# Patient Record
Sex: FEMALE | Race: WHITE | Employment: UNEMPLOYED | URBAN - METROPOLITAN AREA
[De-identification: names, ages, dates, MRNs, and addresses within clinical notes are randomized per-mention and may not be internally consistent; named-entity substitution may affect disease eponyms.]

---

## 2017-05-31 PROBLEM — J30.1 SEASONAL ALLERGIC RHINITIS DUE TO POLLEN: Status: ACTIVE | Noted: 2017-05-31

## 2017-05-31 PROBLEM — Z34.80 OTHER NORMAL PREGNANCY, NOT FIRST: Status: ACTIVE | Noted: 2017-05-31

## 2017-10-17 PROBLEM — Z34.83 MULTIGRAVIDA IN THIRD TRIMESTER: Status: ACTIVE | Noted: 2017-10-17

## 2017-11-10 NOTE — PROGRESS NOTES
Patient ID verified. Allergies, medical history, prenatal record and prior to admission medications verified. Pt instructed to be NPO after midnight. Pt instructed to arrive at hospital @1000,come to entrance C and sign in at the registration desk on the 4th floor. Patient instructed to come to hospital sooner if SROM, labor, or concerning symptoms. Patient verbalized understanding. Questions encouraged and answered. Patient's prenatal record and scheduled delivery form have been scanned into [employer]. Results console and orders have been placed in [facility].

## 2017-11-13 ENCOUNTER — ANESTHESIA (OUTPATIENT)
Dept: LABOR AND DELIVERY | Age: 32
End: 2017-11-13
Payer: COMMERCIAL

## 2017-11-13 ENCOUNTER — ANESTHESIA EVENT (OUTPATIENT)
Dept: LABOR AND DELIVERY | Age: 32
End: 2017-11-13
Payer: COMMERCIAL

## 2017-11-13 ENCOUNTER — HOSPITAL ENCOUNTER (INPATIENT)
Age: 32
LOS: 1 days | Discharge: HOME | End: 2017-11-14
Attending: OBSTETRICS & GYNECOLOGY | Admitting: OBSTETRICS & GYNECOLOGY
Payer: COMMERCIAL

## 2017-11-13 PROBLEM — O48.0 POST-TERM PREGNANCY, 40-42 WEEKS OF GESTATION: Status: ACTIVE | Noted: 2017-11-13

## 2017-11-13 PROBLEM — Z3A.41 41 WEEKS GESTATION OF PREGNANCY: Status: ACTIVE | Noted: 2017-11-13

## 2017-11-13 PROBLEM — O48.0 41 WEEKS GESTATION OF PREGNANCY: Status: RESOLVED | Noted: 2017-11-13 | Resolved: 2017-11-13

## 2017-11-13 PROBLEM — O48.0 POST-TERM PREGNANCY, 40-42 WEEKS OF GESTATION: Status: RESOLVED | Noted: 2017-11-13 | Resolved: 2017-11-13

## 2017-11-13 PROBLEM — O48.0 41 WEEKS GESTATION OF PREGNANCY: Status: ACTIVE | Noted: 2017-11-13

## 2017-11-13 PROBLEM — Z37.9 NORMAL LABOR: Status: ACTIVE | Noted: 2017-11-13

## 2017-11-13 PROBLEM — Z3A.41 41 WEEKS GESTATION OF PREGNANCY: Status: RESOLVED | Noted: 2017-11-13 | Resolved: 2017-11-13

## 2017-11-13 LAB
ABO + RH BLD: NORMAL
BASE DEFICIT BLDCOA-SCNC: 2.4 MMOL/L (ref 0–2)
BASE DEFICIT BLDCOV-SCNC: 2.6 MMOL/L (ref 1.9–7.7)
BDY SITE: ABNORMAL
BDY SITE: ABNORMAL
BLOOD GROUP ANTIBODIES SERPL: NORMAL
ERYTHROCYTE [DISTWIDTH] IN BLOOD BY AUTOMATED COUNT: 14 % (ref 11.9–14.6)
HCO3 BLDCOA-SCNC: 24 MMOL/L (ref 22–26)
HCO3 BLDV-SCNC: 21 MMOL/L
HCT VFR BLD AUTO: 34.8 % (ref 35.8–46.3)
HGB BLD-MCNC: 11.6 G/DL (ref 11.7–15.4)
MCH RBC QN AUTO: 30.1 PG (ref 26.1–32.9)
MCHC RBC AUTO-ENTMCNC: 33.3 G/DL (ref 31.4–35)
MCV RBC AUTO: 90.4 FL (ref 79.6–97.8)
PCO2 BLDCOA: 49 MMHG (ref 33–49)
PCO2 BLDCOV: 35 MMHG (ref 14.1–43.3)
PH BLDCOA: 7.31 [PH] (ref 7.21–7.31)
PH BLDCOV: 7.4 [PH] (ref 7.2–7.44)
PLATELET # BLD AUTO: 249 K/UL (ref 150–450)
PMV BLD AUTO: 11.2 FL (ref 10.8–14.1)
PO2 BLDCOA: 16 MMHG (ref 9–19)
PO2 BLDV: 30 MMHG (ref 30.4–57.2)
RBC # BLD AUTO: 3.85 M/UL (ref 4.05–5.25)
SERVICE CMNT-IMP: ABNORMAL
SERVICE CMNT-IMP: ABNORMAL
SPECIMEN EXP DATE BLD: NORMAL
WBC # BLD AUTO: 9.9 K/UL (ref 4.3–11.1)

## 2017-11-13 PROCEDURE — 77030014125 HC TY EPDRL BBMI -B: Performed by: ANESTHESIOLOGY

## 2017-11-13 PROCEDURE — 0HQ9XZZ REPAIR PERINEUM SKIN, EXTERNAL APPROACH: ICD-10-PCS | Performed by: OBSTETRICS & GYNECOLOGY

## 2017-11-13 PROCEDURE — 77030011943

## 2017-11-13 PROCEDURE — 77030018846 HC SOL IRR STRL H20 ICUM -A

## 2017-11-13 PROCEDURE — 10907ZC DRAINAGE OF AMNIOTIC FLUID, THERAPEUTIC FROM PRODUCTS OF CONCEPTION, VIA NATURAL OR ARTIFICIAL OPENING: ICD-10-PCS | Performed by: OBSTETRICS & GYNECOLOGY

## 2017-11-13 PROCEDURE — 86900 BLOOD TYPING SEROLOGIC ABO: CPT | Performed by: OBSTETRICS & GYNECOLOGY

## 2017-11-13 PROCEDURE — 74011000250 HC RX REV CODE- 250

## 2017-11-13 PROCEDURE — 74011250637 HC RX REV CODE- 250/637: Performed by: OBSTETRICS & GYNECOLOGY

## 2017-11-13 PROCEDURE — 75410000003 HC RECOV DEL/VAG/CSECN EA 0.5 HR

## 2017-11-13 PROCEDURE — 82803 BLOOD GASES ANY COMBINATION: CPT

## 2017-11-13 PROCEDURE — 85027 COMPLETE CBC AUTOMATED: CPT | Performed by: OBSTETRICS & GYNECOLOGY

## 2017-11-13 PROCEDURE — 74011258636 HC RX REV CODE- 258/636: Performed by: OBSTETRICS & GYNECOLOGY

## 2017-11-13 PROCEDURE — 77030011945 HC CATH URIN INT ST MENT -A

## 2017-11-13 PROCEDURE — 74011250636 HC RX REV CODE- 250/636: Performed by: OBSTETRICS & GYNECOLOGY

## 2017-11-13 PROCEDURE — 74011250636 HC RX REV CODE- 250/636

## 2017-11-13 PROCEDURE — 77030002888 HC SUT CHRMC J&J -A

## 2017-11-13 PROCEDURE — 75410000000 HC DELIVERY VAGINAL/SINGLE

## 2017-11-13 PROCEDURE — 65270000029 HC RM PRIVATE

## 2017-11-13 PROCEDURE — 76060000078 HC EPIDURAL ANESTHESIA

## 2017-11-13 PROCEDURE — 74011250636 HC RX REV CODE- 250/636: Performed by: ANESTHESIOLOGY

## 2017-11-13 PROCEDURE — 75410000002 HC LABOR FEE PER 1 HR

## 2017-11-13 PROCEDURE — 36415 COLL VENOUS BLD VENIPUNCTURE: CPT | Performed by: OBSTETRICS & GYNECOLOGY

## 2017-11-13 PROCEDURE — 4A1HXCZ MONITORING OF PRODUCTS OF CONCEPTION, CARDIAC RATE, EXTERNAL APPROACH: ICD-10-PCS | Performed by: OBSTETRICS & GYNECOLOGY

## 2017-11-13 RX ORDER — SODIUM CHLORIDE 0.9 % (FLUSH) 0.9 %
5-10 SYRINGE (ML) INJECTION EVERY 8 HOURS
Status: DISCONTINUED | OUTPATIENT
Start: 2017-11-13 | End: 2017-11-13 | Stop reason: HOSPADM

## 2017-11-13 RX ORDER — LIDOCAINE HYDROCHLORIDE 20 MG/ML
INJECTION, SOLUTION EPIDURAL; INFILTRATION; INTRACAUDAL; PERINEURAL AS NEEDED
Status: DISCONTINUED | OUTPATIENT
Start: 2017-11-13 | End: 2017-11-13 | Stop reason: HOSPADM

## 2017-11-13 RX ORDER — ROPIVACAINE HYDROCHLORIDE 5 MG/ML
INJECTION, SOLUTION EPIDURAL; INFILTRATION; PERINEURAL AS NEEDED
Status: DISCONTINUED | OUTPATIENT
Start: 2017-11-13 | End: 2017-11-13 | Stop reason: HOSPADM

## 2017-11-13 RX ORDER — PRENATAL VIT 96/IRON FUM/FOLIC 27MG-0.8MG
1 TABLET ORAL DAILY
Status: DISCONTINUED | OUTPATIENT
Start: 2017-11-14 | End: 2017-11-14 | Stop reason: HOSPADM

## 2017-11-13 RX ORDER — ROPIVACAINE HYDROCHLORIDE 2 MG/ML
INJECTION, SOLUTION EPIDURAL; INFILTRATION; PERINEURAL
Status: DISCONTINUED | OUTPATIENT
Start: 2017-11-13 | End: 2017-11-13 | Stop reason: HOSPADM

## 2017-11-13 RX ORDER — OXYTOCIN/0.9 % SODIUM CHLORIDE 15/250 ML
250 PLASTIC BAG, INJECTION (ML) INTRAVENOUS ONCE
Status: ACTIVE | OUTPATIENT
Start: 2017-11-13 | End: 2017-11-14

## 2017-11-13 RX ORDER — LORATADINE 10 MG/1
10 TABLET ORAL DAILY
Status: DISCONTINUED | OUTPATIENT
Start: 2017-11-14 | End: 2017-11-14 | Stop reason: HOSPADM

## 2017-11-13 RX ORDER — OXYTOCIN/RINGER'S LACTATE 15/250 ML
250 PLASTIC BAG, INJECTION (ML) INTRAVENOUS ONCE
Status: DISCONTINUED | OUTPATIENT
Start: 2017-11-13 | End: 2017-11-13 | Stop reason: SDUPTHER

## 2017-11-13 RX ORDER — LIDOCAINE HYDROCHLORIDE 20 MG/ML
JELLY TOPICAL
Status: DISCONTINUED | OUTPATIENT
Start: 2017-11-13 | End: 2017-11-13 | Stop reason: HOSPADM

## 2017-11-13 RX ORDER — OXYTOCIN/RINGER'S LACTATE 30/500 ML
.5-2 PLASTIC BAG, INJECTION (ML) INTRAVENOUS
Status: DISCONTINUED | OUTPATIENT
Start: 2017-11-13 | End: 2017-11-13

## 2017-11-13 RX ORDER — SODIUM CHLORIDE 0.9 % (FLUSH) 0.9 %
5-10 SYRINGE (ML) INJECTION AS NEEDED
Status: DISCONTINUED | OUTPATIENT
Start: 2017-11-13 | End: 2017-11-13 | Stop reason: HOSPADM

## 2017-11-13 RX ORDER — ROPIVACAINE HYDROCHLORIDE 2 MG/ML
INJECTION, SOLUTION EPIDURAL; INFILTRATION; PERINEURAL AS NEEDED
Status: DISCONTINUED | OUTPATIENT
Start: 2017-11-13 | End: 2017-11-13 | Stop reason: HOSPADM

## 2017-11-13 RX ORDER — SIMETHICONE 80 MG
80 TABLET,CHEWABLE ORAL
Status: DISCONTINUED | OUTPATIENT
Start: 2017-11-13 | End: 2017-11-14 | Stop reason: HOSPADM

## 2017-11-13 RX ORDER — FENTANYL CITRATE 50 UG/ML
INJECTION, SOLUTION INTRAMUSCULAR; INTRAVENOUS AS NEEDED
Status: DISCONTINUED | OUTPATIENT
Start: 2017-11-13 | End: 2017-11-13 | Stop reason: HOSPADM

## 2017-11-13 RX ORDER — DEXTROSE, SODIUM CHLORIDE, SODIUM LACTATE, POTASSIUM CHLORIDE, AND CALCIUM CHLORIDE 5; .6; .31; .03; .02 G/100ML; G/100ML; G/100ML; G/100ML; G/100ML
125 INJECTION, SOLUTION INTRAVENOUS CONTINUOUS
Status: DISCONTINUED | OUTPATIENT
Start: 2017-11-13 | End: 2017-11-13

## 2017-11-13 RX ORDER — BUTORPHANOL TARTRATE 1 MG/ML
1 INJECTION INTRAMUSCULAR; INTRAVENOUS
Status: DISCONTINUED | OUTPATIENT
Start: 2017-11-13 | End: 2017-11-13

## 2017-11-13 RX ORDER — IBUPROFEN 800 MG/1
800 TABLET ORAL
Status: DISCONTINUED | OUTPATIENT
Start: 2017-11-13 | End: 2017-11-14 | Stop reason: HOSPADM

## 2017-11-13 RX ORDER — MINERAL OIL
120 OIL (ML) ORAL
Status: COMPLETED | OUTPATIENT
Start: 2017-11-13 | End: 2017-11-13

## 2017-11-13 RX ORDER — SODIUM CHLORIDE 0.9 % (FLUSH) 0.9 %
5-10 SYRINGE (ML) INJECTION EVERY 8 HOURS
Status: DISCONTINUED | OUTPATIENT
Start: 2017-11-13 | End: 2017-11-13

## 2017-11-13 RX ORDER — LIDOCAINE HYDROCHLORIDE 10 MG/ML
1 INJECTION INFILTRATION; PERINEURAL
Status: DISCONTINUED | OUTPATIENT
Start: 2017-11-13 | End: 2017-11-13 | Stop reason: HOSPADM

## 2017-11-13 RX ORDER — OXYTOCIN/0.9 % SODIUM CHLORIDE 15/250 ML
250 PLASTIC BAG, INJECTION (ML) INTRAVENOUS ONCE
Status: DISCONTINUED | OUTPATIENT
Start: 2017-11-13 | End: 2017-11-13

## 2017-11-13 RX ORDER — HYDROCODONE BITARTRATE AND ACETAMINOPHEN 5; 325 MG/1; MG/1
1 TABLET ORAL
Status: DISCONTINUED | OUTPATIENT
Start: 2017-11-13 | End: 2017-11-14 | Stop reason: HOSPADM

## 2017-11-13 RX ORDER — SODIUM CHLORIDE 0.9 % (FLUSH) 0.9 %
5-10 SYRINGE (ML) INJECTION AS NEEDED
Status: DISCONTINUED | OUTPATIENT
Start: 2017-11-13 | End: 2017-11-13

## 2017-11-13 RX ADMIN — OXYTOCIN 2 MILLI-UNITS/MIN: 10 INJECTION, SOLUTION INTRAMUSCULAR; INTRAVENOUS at 14:09

## 2017-11-13 RX ADMIN — FENTANYL CITRATE 100 MCG: 50 INJECTION, SOLUTION INTRAMUSCULAR; INTRAVENOUS at 14:55

## 2017-11-13 RX ADMIN — LIDOCAINE HYDROCHLORIDE 6 ML: 20 INJECTION, SOLUTION EPIDURAL; INFILTRATION; INTRACAUDAL; PERINEURAL at 13:30

## 2017-11-13 RX ADMIN — SODIUM CHLORIDE, SODIUM LACTATE, POTASSIUM CHLORIDE, AND CALCIUM CHLORIDE 1000 ML: 600; 310; 30; 20 INJECTION, SOLUTION INTRAVENOUS at 10:45

## 2017-11-13 RX ADMIN — ROPIVACAINE HYDROCHLORIDE 5 ML: 5 INJECTION, SOLUTION EPIDURAL; INFILTRATION; PERINEURAL at 15:56

## 2017-11-13 RX ADMIN — MINERAL OIL 120 ML: 471.95 OIL ORAL at 16:19

## 2017-11-13 RX ADMIN — SODIUM CHLORIDE, SODIUM LACTATE, POTASSIUM CHLORIDE, CALCIUM CHLORIDE, AND DEXTROSE MONOHYDRATE 125 ML/HR: 600; 310; 30; 20; 5 INJECTION, SOLUTION INTRAVENOUS at 10:30

## 2017-11-13 RX ADMIN — ROPIVACAINE HYDROCHLORIDE 3 ML: 2 INJECTION, SOLUTION EPIDURAL; INFILTRATION; PERINEURAL at 11:19

## 2017-11-13 RX ADMIN — ROPIVACAINE HYDROCHLORIDE 10 ML/HR: 2 INJECTION, SOLUTION EPIDURAL; INFILTRATION; PERINEURAL at 11:19

## 2017-11-13 RX ADMIN — ROPIVACAINE HYDROCHLORIDE 4 ML: 2 INJECTION, SOLUTION EPIDURAL; INFILTRATION; PERINEURAL at 11:17

## 2017-11-13 NOTE — PROGRESS NOTES
Dr. Natalie Spaulding at bedside, strip reviewed by MD. AROM with light meconium per MD, SVE 6-7 per MD. See MD note

## 2017-11-13 NOTE — H&P
History & Physical    Name: Mackenzie Anderson MRN: [id number]  SSN: [id number]    YOB: 1985  Age: 34 y.o. Sex: female      Subjective:     Estimated Date of Delivery: 17  OB History    Para Term  AB Living   5 4 4   4   SAB TAB Ectopic Molar Multiple Live Births        4      # Outcome Date GA Lbr Dom/2nd Weight Sex Delivery Anes PTL Lv   5 Current            4 Term     F Vag-Spont   JOYA   3 Term     M Vag-Spont   JOYA   2 Term     F Vag-Spont   JOYA   1 Term     M Vag-Spont   JOYA          Ms. Avni Greenberg is admitted with pregnancy at 41w5d for induction of labor due to post dates. Prenatal course was normal.  Please see prenatal records for details. Past Medical History:   Diagnosis Date    Palpitations 11/10/2015     No past surgical history on file. Social History     Occupational History    Not on file. Social History Main Topics    Smoking status: Never Smoker    Smokeless tobacco: Never Used    Alcohol use No    Drug use: No    Sexual activity: Not on file     Family History   Problem Relation Age of Onset    No Known Problems Mother     No Known Problems Father     No Known Problems Sister     No Known Problems Brother     No Known Problems Maternal Aunt     No Known Problems Maternal Uncle     No Known Problems Paternal Aunt     No Known Problems Paternal Uncle     No Known Problems Maternal Grandmother     No Known Problems Maternal Grandfather     No Known Problems Paternal Grandmother     No Known Problems Paternal Grandfather        No Known Allergies  Prior to Admission medications    Medication Sig Start Date End Date Taking? Authorizing Provider   Prenatal 18-Iron-FA-DSS 90-1-50 mg tab Take  by mouth. Historical Provider   loratadine (CLARITIN) 10 mg tablet Take 1 Tab by mouth daily. 17   Yanni Bergeron MD        Review of Systems: Pertinent items are noted in the History of Present Illness.     Objective:         Physical Exam:  Patient without distress. Heart: Regular rate and rhythm  Lung: clear to auscultation throughout lung fields, no wheezes, no rales, no rhonchi and normal respiratory effort  Back: costovertebral angle tenderness absent  Abdomen: soft, nontender  Fundus: soft and non tender  Perineum: blood absent, amniotic fluid absent  Cervical Exam: 4-5 cm dilated    80% effaced    -2 station    Lower Extremities:  - Edema 1+   - No evidence of DVT seen on physical exam.  Membranes:  Intact      Prenatal Labs:   Lab Results   Component Value Date/Time    Rubella, External 11.90 04/17/2017    HBsAg, External Negative 04/17/2017    HIV, External N.R. 04/17/2017    RPR, External Non-Reactive 04/17/2017    ABO,Rh A+ Positive 04/17/2017    GrBStrep, External Negative 09/26/2017       Impression/Plan:     Principal Problem:    Post-term pregnancy, 40-42 weeks of gestation (11/13/2017)    Active Problems:    41 weeks gestation of pregnancy (11/13/2017)         Plan: Admit for induction of labor. Group B Strep negative.     Signed By:  Ted Padgett MD     November 13, 2017

## 2017-11-13 NOTE — PROGRESS NOTES
Admission assessment complete, see flowsheet for complete assessment. Abdomen palpated soft and non-tender. +FM. POC discussed with patient with patient and spouse, verbalize understanding and agreement. Deny any needs or concerns. Patient resting in bed with spouse at side. Call light within reach.

## 2017-11-13 NOTE — ANESTHESIA POSTPROCEDURE EVALUATION
Post-Anesthesia Evaluation and Assessment    Patient: Alex Quach MRN: [id number]  SSN: [id number]    YOB: 1985  Age: 34 y.o. Sex: female       Cardiovascular Function/Vital Signs  Visit Vitals    /61    Pulse 97    Temp 36.9 °C (98.4 °F)    Resp 18    Ht 5' 5\" (1.651 m)    Breastfeeding Yes       Patient is status post epidural anesthesia for * No procedures listed *. Nausea/Vomiting: None    Postoperative hydration reviewed and adequate. Pain:  Pain Scale 1: Numeric (0 - 10) (11/13/17 1630)  Pain Intensity 1: 0 (11/13/17 1630)   Managed    Neurological Status:   Neuro (WDL): Exceptions to WDL (11/13/17 1129)  Neuro  Neurologic State: Alert (11/13/17 1630)  Orientation Level: Oriented X4 (11/13/17 1630)  Cognition: Appropriate decision making (11/13/17 1630)  Speech: Clear (11/13/17 1630)  LUE Motor Response: Purposeful (11/13/17 1630)  LLE Motor Response: Numbness (post anesthesia) (11/13/17 1630)  RUE Motor Response: Purposeful (11/13/17 1630)  RLE Motor Response: Numbness (post anesthesia) (11/13/17 1630)   At baseline    Mental Status and Level of Consciousness: Arousable    Pulmonary Status:   O2 Device: Room air (11/13/17 1630)   Adequate oxygenation and airway patent    Complications related to anesthesia: None    Post-anesthesia assessment completed.  No concerns    Signed By: Nola Chiang MD     November 13, 2017

## 2017-11-13 NOTE — PROGRESS NOTES
1059 Patient sitting up on side of bed for an epidural placement. Dr. Jeanne Hickman, anesthesiologist and Milad Villegas CRNA at bedside.    1104 time out complete  1113 test dose per Dr. Jeanne Hickman, see anesthesia record for dosing, BPs per flowsheet  [phone] Patient back to bed with right hip tilt, toco and ultrasound adjusted frequently  1126 Patient to left side, toco and ultrasound adjusted frequently

## 2017-11-13 NOTE — IP AVS SNAPSHOT
[id number]  [address] 
[phone] Patient: Rita Amin MRN: [id number] QMZ: About your hospitalization You were admitted on:  2017 You last received care in the:  [address] [facility] You were discharged on:  2017 Why you were hospitalized Your primary diagnosis was:  Post-Term Pregnancy, 40-42 Weeks Of Gestation Your diagnoses also included:  41 Weeks Gestation Of Pregnancy,  (Spontaneous Vaginal Delivery) Things You Need To Do (next 8 weeks) Follow up with None Where:  None (395) Patient stated that they have no PCP Friday Dec 15, 2017 Global Post Op with Jayne Alvarado MD at 11:00 AM  
Where:  [facility]) Discharge Orders None A check crissy indicates which time of day the medication should be taken. My Medications TAKE these medications as instructed Instructions Each Dose to Equal  
 Morning Noon Evening Bedtime HYDROcodone-acetaminophen 5-325 mg per tablet Commonly known as:  Rocco Castellanos Your last dose was: Your next dose is: Take 1 Tab by mouth every four (4) hours as needed. Max Daily Amount: 6 Tabs. 1 Tab  
    
   
   
   
  
 ibuprofen 800 mg tablet Commonly known as:  MOTRIN Your last dose was: Your next dose is: Take 1 Tab by mouth every six (6) hours as needed. 800 mg  
    
   
   
   
  
 loratadine 10 mg tablet Commonly known as:  Drena Magic Your last dose was: Your next dose is: Take 1 Tab by mouth daily. 10 mg  
    
   
   
   
  
 Prenatal 18-Iron-FA-DSS 90-1-50 mg Tab Your last dose was: Your next dose is: Take  by mouth. Where to Get Your Medications These medications were sent to [facility], [address] [place] [address] Phone:  [phone]  
  ibuprofen 800 mg tablet Information on where to get these meds will be given to you by the nurse or doctor. ! Ask your nurse or doctor about these medications HYDROcodone-acetaminophen 5-325 mg per tablet Discharge Instructions DISCHARGE SUMMARY from Nurse PATIENT INSTRUCTIONS: 
 
What to do at Home: 
Recommended activity: Discharge instruction to follow: Activity: Pelvis rest for 6 weeks No heavy lifting over 15 lbs for 2 weeks No driving for 2 weeks No push/pull motion such as sweeping or vacuuming for 2 weeks No tub baths for 6 weeks If using jammie-bottle continue to use until comfortable stopping. Change sanitary pad after each urination or bowel movement. Call MD for the following: 
    Fever over 101 F; pain not relieved by medication; foul smelling vaginal discharge or an increase in vaginal bleeding. Take medication as prescribed. Follow up with MD as ordered. If you experience any of the following symptoms ***, please follow up with ***. *  Please give a list of your current medications to your Primary Care Provider. *  Please update this list whenever your medications are discontinued, doses are 
    changed, or new medications (including over-the-counter products) are added. *  Please carry medication information at all times in case of emergency situations. These are general instructions for a healthy lifestyle: No smoking/ No tobacco products/ Avoid exposure to second hand smoke Surgeon General's Warning:  Quitting smoking now greatly reduces serious risk to your health. Obesity, smoking, and sedentary lifestyle greatly increases your risk for illness A healthy diet, regular physical exercise & weight monitoring are important for maintaining a healthy lifestyle You may be retaining fluid if you have a history of heart failure or if you experience any of the following symptoms:  Weight gain of 3 pounds or more overnight or 5 pounds in a week, increased swelling in our hands or feet or shortness of breath while lying flat in bed. Please call your doctor as soon as you notice any of these symptoms; do not wait until your next office visit. The discharge information has been reviewed with the patient. The patient verbalized understanding. Discharge medications reviewed with the {[facility] reviewed [facility]:[id number]} and appropriate educational materials and side effects teaching were provided. ___________________________________________________________________________________________________________________________________ Vaginal Childbirth: Care Instructions Your Care Instructions Your body will slowly heal in the next few weeks. It is easy to get too tired and overwhelmed during the first weeks after your baby is born. Changes in your hormones can shift your mood without warning. You may find it hard to meet the extra demands on your energy and time. Take it easy on yourself. Follow-up care is a key part of your treatment and safety. Be sure to make and go to all appointments, and call your doctor if you are having problems. It's also a good idea to know your test results and keep a list of the medicines you take. How can you care for yourself at home? · Vaginal bleeding and cramps ¨ After delivery, you will have a bloody discharge from the vagina. This will turn pink within a week and then white or yellow after about 10 days. It may last for 2 to 4 weeks or longer, until the uterus has healed. Use pads instead of tampons until you stop bleeding. ¨ Do not worry if you pass some blood clots, as long as they are smaller than a golf ball.  If you have a tear or stitches in your vaginal area, change the pad at least every 4 hours to prevent soreness and infection. ¨ You may have cramps for the first few days after childbirth. These are normal and occur as the uterus shrinks to normal size. Take an over-the-counter pain medicine, such as acetaminophen (Tylenol), ibuprofen (Advil, Motrin), or naproxen (Aleve), for cramps. Read and follow all instructions on the label. Do not take aspirin, because it can cause more bleeding. ¨ Do not take two or more pain medicines at the same time unless the doctor told you to. Many pain medicines have acetaminophen, which is Tylenol. Too much acetaminophen (Tylenol) can be harmful. · Stitches ¨ If you have stitches, they will dissolve on their own and do not need to be removed. Follow your doctor's instructions for cleaning the stitched area. ¨ Put ice or a cold pack on your painful area for 10 to 20 minutes at a time, several times a day, for the first few days. Put a thin cloth between the ice and your skin. ¨ Sit in a few inches of warm water (sitz bath) 3 times a day and after bowel movements. The warm water helps with pain and itching. If you do not have a tub, a warm shower might help. · Breast fullness ¨ Your breasts may overfill (engorge) in the first few days after delivery. To help milk flow and to relieve pain, warm your breasts in the shower or by using warm, moist towels before nursing. ¨ If you are not nursing, do not put warmth on your breasts or touch your breasts. Wear a tight bra or sports bra and use ice until the fullness goes away. This usually takes 2 to 3 days. ¨ Put ice or a cold pack on your breast after nursing to reduce swelling and pain. Put a thin cloth between the ice and your skin. · Activity ¨ Eat a balanced diet. Do not try to lose weight by cutting calories. Keep taking your prenatal vitamins, or take a multivitamin. ¨ Get as much rest as you can. Try to take naps when your baby sleeps during the day. ¨ Get some exercise every day. But do not do any heavy exercise until your doctor says it is okay. ¨ Wait until you are healed (about 4 to 6 weeks) before you have sexual intercourse. Your doctor will tell you when it is okay to have sex. ¨ Talk to your doctor about birth control. You can get pregnant even before your period returns. Also, you can get pregnant while you are breastfeeding. · Mental health ¨ It is normal to have some sadness, anxiety, sleeplessness, and mood swings after you go home. If you feel upset or hopeless for more than a few days or are having trouble doing the things you need to do, talk to your doctor. · Constipation and hemorrhoids ¨ Drink plenty of fluids, enough so that your urine is light yellow or clear like water. If you have kidney, heart, or liver disease and have to limit fluids, talk with your doctor before you increase the amount of fluids you drink. ¨ Eat plenty of fiber each day. Have a bran muffin or bran cereal for breakfast, and try eating a piece of fruit for a mid-afternoon snack. ¨ For painful, itchy hemorrhoids, put ice or a cold pack on the area several times a day for 10 minutes at a time. Follow this by putting a warm compress on the area for another 10 to 20 minutes or by sitting in a shallow, warm bath. When should you call for help? Call 911 anytime you think you may need emergency care. For example, call if: 
? · You passed out (lost consciousness). ?Call your doctor now or seek immediate medical care if: 
? · You have severe vaginal bleeding. ? · You are dizzy or lightheaded, or you feel like you may faint. ? · You have a fever. ? · You have new or more pain in your belly or pelvis. ? Watch closely for changes in your health, and be sure to contact your doctor if: 
? · Your vaginal bleeding seems to be getting heavier. ? · You have new or worse vaginal discharge. ? · You feel sad, anxious, or hopeless for more than a few days. ? · You do not get better as expected. Where can you learn more? Go to http://matt-loren.info/. Enter Z200 in the search box to learn more about \"Vaginal Childbirth: Care Instructions. \" Current as of: 2017 Content Version: 11.4 © 7094-7378 [employer]. Care instructions adapted under license by [employer] (which disclaims liability or warranty for this information). If you have questions about a medical condition or this instruction, always ask your healthcare professional. [address] any warranty or liability for your use of this information. After Your Delivery (the Postpartum Period): Care Instructions Your Care Instructions Congratulations on the birth of your baby. Like pregnancy, the  period can be a time of excitement, leesa, and exhaustion. You may look at your wondrous little baby and feel happy. You may also be overwhelmed by your new sleep hours and new responsibilities. At first, babies often sleep during the days and are awake at night. They do not have a pattern or routine. They may make sudden gasps, jerk themselves awake, or look like they have crossed eyes. These are all normal, and they may even make you smile. In these first weeks after delivery, try to take good care of yourself. It may take 4 to 6 weeks to feel like yourself again, and possibly longer if you had a  birth. You will likely feel very tired for several weeks. Your days will be full of ups and downs, but lots of leesa as well. Follow-up care is a key part of your treatment and safety. Be sure to make and go to all appointments, and call your doctor if you are having problems. It's also a good idea to know your test results and keep a list of the medicines you take. How can you care for yourself at home? Take care of your body after delivery · Use pads instead of tampons for the bloody flow that may last as long as 2 weeks. · Ease cramps with ibuprofen (Advil, Motrin). · Ease soreness of hemorrhoids and the area between your vagina and rectum with ice compresses or witch hazel pads. · Ease constipation by drinking lots of fluid and eating high-fiber foods. Ask your doctor about over-the-counter stool softeners. · Cleanse yourself with a gentle squeeze of warm water from a bottle instead of wiping with toilet paper. · Take a sitz bath in warm water several times a day. · Wear a good nursing bra. Ease sore and swollen breasts with warm, wet washcloths. · If you are not breastfeeding, use ice rather than heat for breast soreness. · Your period may not start for several months if you are breastfeeding. You may bleed more, and longer at first, than you did before you got pregnant. · Wait until you are healed (about 4 to 6 weeks) before you have sexual intercourse. Your doctor will tell you when it is okay to have sex. · Try not to travel with your baby for 5 or 6 weeks. If you take a long car trip, make frequent stops to walk around and stretch. Avoid exhaustion · Rest every day. Try to nap when your baby naps. · Ask another adult to be with you for a few days after delivery. · Plan for  if you have other children. · Stay flexible so you can eat at odd hours and sleep when you need to. Both you and your baby are making new schedules. · Plan small trips to get out of the house. Change can make you feel less tired. · Ask for help with housework, cooking, and shopping. Remind yourself that your job is to care for your baby. Know about help for postpartum depression · \"Baby blues\" are common for the first 1 to 2 weeks after birth. You may cry or feel sad or irritable for no reason. · Rest whenever you can. Being tired makes it harder to handle your emotions. · Go for walks with your baby. · Talk to your partner, friends, and family about your feelings. · If your symptoms last for more than a few weeks, or if you feel very depressed, ask your doctor for help. · Postpartum depression can be treated. Support groups and counseling can help. Sometimes medicine can also help. Stay healthy · Eat healthy foods so you have more energy, make good breast milk, and lose extra baby pounds. · If you breastfeed, avoid alcohol and drugs. Stay smoke-free. If you quit during pregnancy, congratulations. · Start daily exercise after 4 to 6 weeks, but rest when you feel tired. · Learn exercises to tone your belly. Do Kegel exercises to regain strength in your pelvic muscles. You can do these exercises while you stand or sit. ¨ Squeeze the same muscles you would use to stop your urine. Your belly and thighs should not move. ¨ Hold the squeeze for 3 seconds, and then relax for 3 seconds. ¨ Start with 3 seconds. Then add 1 second each week until you are able to squeeze for 10 seconds. ¨ Repeat the exercise 10 to 15 times for each session. Do three or more sessions each day. · Find a class for new mothers and new babies that has an exercise time. · If you had a  birth, give yourself a bit more time before you exercise, and be careful. When should you call for help? Call 911 anytime you think you may need emergency care. For example, call if: 
? · You passed out (lost consciousness). ?Call your doctor now or seek immediate medical care if: 
? · You have severe vaginal bleeding. This means you are passing blood clots and soaking through a pad each hour for 2 or more hours. ? · You are dizzy or lightheaded, or you feel like you may faint. ? · You have a fever. ? · You have new belly pain, or your pain gets worse. ? Watch closely for changes in your health, and be sure to contact your doctor if: 
? · Your vaginal bleeding seems to be getting heavier. ? · You have new or worse vaginal discharge. ? · You feel sad, anxious, or hopeless for more than a few days. ? · You do not get better as expected. Where can you learn more? Go to http://matt-loren.info/. Enter A461 in the search box to learn more about \"After Your Delivery (the Postpartum Period): Care Instructions. \" Current as of: March 16, 2017 Content Version: 11.4 © 3690-5237 [employer]. Care instructions adapted under license by [employer] (which disclaims liability or warranty for this information). If you have questions about a medical condition or this instruction, always ask your healthcare professional. [address] any warranty or liability for your use of this information. Introducing [facility]! Shelly Cee introduces [facility] patient portal. Now you can access parts of your medical record, email your doctor's office, and request medication refills online. 1. In your internet browser, go to [email or web address] 2. Click on the First Time User? Click Here link in the Sign In box. You will see the New Member Sign Up page. 3. Enter your [facility] Access Code exactly as it appears below. You will not need to use this code after youve completed the sign-up process. If you do not sign up before the expiration date, you must request a new code. · [facility] Access Code: [email or web address] Expires: 11/28/2017  9:28 AM 
 
4. Enter the last four digits of your Social Security Number (xxxx) and Date of Birth (mm/dd/yyyy) as indicated and click Submit. You will be taken to the next sign-up page. 5. Create a [facility] ID. This will be your [facility] login ID and cannot be changed, so think of one that is secure and easy to remember. 6. Create a [facility] password. You can change your password at any time. 7. Enter your Password Reset Question and Answer. This can be used at a later time if you forget your password. 8. Enter your e-mail address. You will receive e-mail notification when new information is available in [address] 9. Click Sign Up. You can now view and download portions of your medical record. 10. Click the Download Summary menu link to download a portable copy of your medical information. If you have questions, please visit the Frequently Asked Questions section of the JoinTV website. Remember, [employer] is NOT to be used for urgent needs. For medical emergencies, dial 911. Now available from your iPhone and Android! Providers Seen During Your Hospitalization Provider Specialty Primary office phone Skylar Fields MD Obstetrics & Gynecology [phone] Immunizations Administered for This Admission Name Date Influenza Vaccine (Quad) PF  Deferred () Tdap  Deferred () Your Primary Care Physician (PCP) Primary Care Physician Office Phone Office Fax NONE ** None ** ** None ** You are allergic to the following No active allergies Recent Documentation Height Breastfeeding? OB Status Smoking Status 1.651 m Yes Recent pregnancy Never Smoker Emergency Contacts Name Discharge Info Relation Home Work Mobile Avni Cason  Spouse [3] [phone] Patient Belongings The following personal items are in your possession at time of discharge: 
  Dental Appliances: None  Visual Aid: None      Home Medications: None   Jewelry: None  Clothing: At bedside, Footwear, Pants, Shirt, Undergarments    Other Valuables: At bedside, Cell Phone  Personal Items Sent to Safe: declined Please provide this summary of care documentation to your next provider. Signatures-by signing, you are acknowledging that this After Visit Summary has been reviewed with you and you have received a copy. Patient Signature:  ____________________________________________________________ Date:  ____________________________________________________________  
  
Yolie Feller Provider Signature:  ____________________________________________________________ Date:  ____________________________________________________________

## 2017-11-13 NOTE — PROGRESS NOTES
Patient admitted to room 432 for scheduled induction. Oriented to room and bed. EFM/TOCO tested and applied.

## 2017-11-13 NOTE — ANESTHESIA PROCEDURE NOTES
Epidural Block    Start time: 11/13/2017 11:05 AM  End time: 11/13/2017 11:13 AM  Performed by: Bear Zheng  Authorized by: Bear Zheng     Pre-Procedure  Indication: labor epidural    Preanesthetic Checklist: patient identified, risks and benefits discussed, anesthesia consent, site marked, patient being monitored, timeout performed and anesthesia consent    Timeout Time: 11:04        Epidural:   Patient position:  Seated  Prep region:  Lumbar  Prep: Patient draped and Chlorhexidine    Location:  L3-4    Needle and Epidural Catheter:   Needle Type:  Tuohy  Needle Gauge:  17 G  Injection Technique:  Loss of resistance using air  Attempts:  1  Catheter Size:  19 G  Depth in Epidural Space (cm):  5  Events: no blood with aspiration, no cerebrospinal fluid with aspiration, no paresthesia and negative aspiration test    Test Dose:  Lidocaine 1.5% w/ epi and negative    Assessment:   Catheter Secured:  Tegaderm and tape  Insertion:  Uncomplicated  Patient tolerance:  Patient tolerated the procedure well with no immediate complications

## 2017-11-13 NOTE — PROGRESS NOTES
[address] Hazel Isaac at bedside, strip reviewed by MD. SVE 10/100/+2 per MD. See MD note. 1615 Room set up for delivery. Patient to lithotomy and jammie-wash provided. 26  of viable female infant, apgars 9/9. Infant stimulated and dried on chest.   1625 Placenta delivered, pitocin infusing  1626 Infant taken to warmer for assessment and measurements per patient request. Initial infant assessment performed. [address] performed by MD, 100cc urine output noted  1630 Recovery started.  Epidural off

## 2017-11-13 NOTE — LACTATION NOTE
Discussed pt's choice of infant feeding method. Feeding options explored, including the importance of exclusive breastfeeding. Pt informed of our breastfeeding support system from from nursing staff and lactation staff during inpatient stay and following discharge. Questions and concerns addressed at this time. Pt confirms breastfeeding is her decision at this time. Dustin Burch

## 2017-11-13 NOTE — ANESTHESIA PREPROCEDURE EVALUATION
Anesthetic History               Review of Systems / Medical History  Patient summary reviewed, nursing notes reviewed and pertinent labs reviewed    Pulmonary                   Neuro/Psych              Cardiovascular                  Exercise tolerance: >4 METS     GI/Hepatic/Renal                Endo/Other             Other Findings              Physical Exam    Airway  Mallampati: I  TM Distance: 4 - 6 cm  Neck ROM: normal range of motion   Mouth opening: Normal     Cardiovascular               Dental         Pulmonary                 Abdominal  GI exam deferred       Other Findings            Anesthetic Plan    ASA: 1  Anesthesia type: epidural            Anesthetic plan and risks discussed with: Patient and Spouse

## 2017-11-13 NOTE — PROGRESS NOTES
Labor Progress Note  Patient seen, fetal heart rate and contraction pattern evaluated, patient examined.   Patient Vitals for the past 1 hrs:   BP Pulse   11/13/17 1537 113/58 82   11/13/17 1522 124/59 83       Physical Exam:  Cervical Exam:  10 cm dilated    100% effaced    +2 station      Uterine Activity: Frequency: Every 2-3 minutes and Intensity: moderate  Fetal Heart Rate: Reactive    Assessment/Plan:  Reassuring fetal status, Continue plan for vaginal delivery

## 2017-11-13 NOTE — IP AVS SNAPSHOT
[place] 
 
 
 [address] 
[phone] Patient: Dariel Cavazos MRN: [id number] NHJ:[birth year] My Medications TAKE these medications as instructed Instructions Each Dose to Equal  
 Morning Noon Evening Bedtime HYDROcodone-acetaminophen 5-325 mg per tablet Commonly known as:  Madhavi Mura Your last dose was: Your next dose is: Take 1 Tab by mouth every four (4) hours as needed. Max Daily Amount: 6 Tabs. 1 Tab  
    
   
   
   
  
 ibuprofen 800 mg tablet Commonly known as:  MOTRIN Your last dose was: Your next dose is: Take 1 Tab by mouth every six (6) hours as needed. 800 mg  
    
   
   
   
  
 loratadine 10 mg tablet Commonly known as:  Chelsie Martinez Your last dose was: Your next dose is: Take 1 Tab by mouth daily. 10 mg  
    
   
   
   
  
 Prenatal 18-Iron-FA-DSS 90-1-50 mg Tab Your last dose was: Your next dose is: Take  by mouth. Where to Get Your Medications These medications were sent to [facility] [address], [address] [place] [address] Phone:  [phone]  
  ibuprofen 800 mg tablet Information on where to get these meds will be given to you by the nurse or doctor. ! Ask your nurse or doctor about these medications HYDROcodone-acetaminophen 5-325 mg per tablet

## 2017-11-13 NOTE — PROGRESS NOTES
Labor Progress Note  Patient seen, fetal heart rate and contraction pattern evaluated, patient examined. Physical Exam:  Cervical Exam: 6-7cm dilated    80% effaced    -1 station    Membranes:  Artificial Rupture of Membranes;  Amniotic Fluid: medium amount of thin meconium fluid  Uterine Activity: Frequency: irregular and Intensity: moderate  Fetal Heart Rate: Reactive    Assessment/Plan:  Reassuring fetal status, Continue plan for vaginal delivery

## 2017-11-14 VITALS
HEIGHT: 65 IN | RESPIRATION RATE: 16 BRPM | OXYGEN SATURATION: 97 % | HEART RATE: 72 BPM | TEMPERATURE: 98.2 F | DIASTOLIC BLOOD PRESSURE: 56 MMHG | SYSTOLIC BLOOD PRESSURE: 105 MMHG

## 2017-11-14 PROCEDURE — 74011250637 HC RX REV CODE- 250/637: Performed by: OBSTETRICS & GYNECOLOGY

## 2017-11-14 RX ORDER — HYDROCODONE BITARTRATE AND ACETAMINOPHEN 5; 325 MG/1; MG/1
1 TABLET ORAL
Qty: 20 TAB | Refills: 0 | Status: SHIPPED | OUTPATIENT
Start: 2017-11-14 | End: 2017-12-13

## 2017-11-14 RX ORDER — IBUPROFEN 800 MG/1
800 TABLET ORAL
Qty: 30 TAB | Refills: 1 | Status: SHIPPED | OUTPATIENT
Start: 2017-11-14 | End: 2017-12-13

## 2017-11-14 RX ADMIN — IBUPROFEN 800 MG: 800 TABLET, FILM COATED ORAL at 14:34

## 2017-11-14 RX ADMIN — IBUPROFEN 800 MG: 800 TABLET, FILM COATED ORAL at 01:50

## 2017-11-14 NOTE — PROGRESS NOTES
Post-Partum Day Number 1 Progress Note    Patient doing well post-partum without significant complaint. Voiding withour difficulty, normal lochia. Wants to go home today    Vitals:      Temp (24hrs), Av.5 °F (36.9 °C), Min:98.1 °F (36.7 °C), Max:99 °F (37.2 °C)      Vital signs stable, afebrile. Exam:  Patient without distress. Abdomen soft, fundus firm at level of umbilicus, nontender               Perineum with normal lochia noted. Lower extremities are negative for swelling, cords or tenderness. Assessment and Plan:  Patient appears to be having uncomplicated post-partum course. Continue routine perineal care and maternal education. Plan discharge if baby can go home.

## 2017-11-14 NOTE — PROGRESS NOTES
Bedside report received from Monie Paulino RN. Care assumed. Pt resting in bed. Denies needs at this time. Encouraged to call for needs or concerns. Verbalized understanding.

## 2017-11-14 NOTE — LACTATION NOTE

## 2017-11-14 NOTE — LACTATION NOTE
In to see mom and infant for first time. Experienced mom stated that infant was latching and nursing but that her nipples are getting sore. Observed infant on left breast in cradle hold. Infant was away from the breast and did not have all of mom's nipple in her mouth. Reviewed with mom how to latch infant using cross cradle hold and then once infant is latched she can change to cradle. Also reminded mom to have her turned in to her and lying against mom's body. Also showed mom how to position infant to get that deeper latch. Mom stated that it did feel much better after to changed positioning. Gave mom lanolin to use on her nipples. Mom and infant are discharging later this afternoon. Mom stated she has no questions or concerns at this time. Encouraged mom to follow up with our outpatient lactation consultant as needed.

## 2017-11-14 NOTE — PROGRESS NOTES
SBAR OUT Report: Mother    Verbal report given to Akash Crews RN on this patient, who is now being transferred to [facility] for routine progression of care. The patient is not wearing a green \"Anesthesia-Duramorph\" band. Report consisted of patient's Situation, Background, Assessment and Recommendations (SBAR).  ID bands were compared with the identification form, and verified with the patient and receiving nurse. Information from the SBAR, Procedure Summary, Intake/Output and MAR and the [place] Report was reviewed with the receiving nurse; opportunity for questions and clarification provided.

## 2017-11-14 NOTE — ROUTINE PROCESS
SBAR IN Report: Mother    Verbal report received from Michelle Amaya RN on this patient, who is now being transferred from [facility] for routine progression of care. The patient is not wearing a green \"Anesthesia-Duramorph\" band. Report consisted of patient's Situation, Background, Assessment and Recommendations (SBAR). [place] ID bands were compared with the identification form, and verified with the patient and transferring nurse. Information from the SBAR, Procedure Summary, Intake/Output and MAR and the [place] Report was reviewed with the transferring nurse; opportunity for questions and clarification provided.

## 2017-11-14 NOTE — PROGRESS NOTES
Patient discharged to home after ID bands verified and 's HUGS tag removed. Pt transported with infant in car seat via wheelchair to private vehicle. Accompanied by PC.

## 2017-11-14 NOTE — PROGRESS NOTES
Discharge teaching completed with patient, patient verbalizes understanding; questions encouraged. E-sign completed. Prescription given. Discharge pending infant's lab results.

## 2017-11-14 NOTE — PROGRESS NOTES
Pt assisted to BR, unable to bear weight fully on right leg. Voided 800 cc without difficulty. Nery care taught. Pt instructed not to get out of bed with out her RN. Verbalized understanding.

## 2017-11-14 NOTE — DISCHARGE INSTRUCTIONS
DISCHARGE SUMMARY from Nurse    PATIENT INSTRUCTIONS:    What to do at Home:  Recommended activity: Discharge instruction to follow: Activity: Pelvis rest for 6 weeks     No heavy lifting over 15 lbs for 2 weeks     No driving for 2 weeks     No push/pull motion such as sweeping or vacuuming for 2 weeks     No tub baths for 6 weeks    If using jammie-bottle continue to use until comfortable stopping. Change sanitary pad after each urination or bowel movement. Call MD for the following:      Fever over 101 F; pain not relieved by medication; foul smelling vaginal discharge or an increase in vaginal bleeding. Take medication as prescribed. Follow up with MD as ordered. *  Please give a list of your current medications to your Primary Care Provider. *  Please update this list whenever your medications are discontinued, doses are      changed, or new medications (including over-the-counter products) are added. *  Please carry medication information at all times in case of emergency situations. These are general instructions for a healthy lifestyle:    No smoking/ No tobacco products/ Avoid exposure to second hand smoke  Surgeon General's Warning:  Quitting smoking now greatly reduces serious risk to your health. Obesity, smoking, and sedentary lifestyle greatly increases your risk for illness    A healthy diet, regular physical exercise & weight monitoring are important for maintaining a healthy lifestyle    You may be retaining fluid if you have a history of heart failure or if you experience any of the following symptoms:  Weight gain of 3 pounds or more overnight or 5 pounds in a week, increased swelling in our hands or feet or shortness of breath while lying flat in bed. Please call your doctor as soon as you notice any of these symptoms; do not wait until your next office visit. The discharge information has been reviewed with the patient. The patient verbalized understanding.   Discharge medications reviewed with the patient and appropriate educational materials and side effects teaching were provided. ___________________________________________________________________________________________________________________________________         Vaginal Childbirth: Care Instructions  Your Care Instructions    Your body will slowly heal in the next few weeks. It is easy to get too tired and overwhelmed during the first weeks after your baby is born. Changes in your hormones can shift your mood without warning. You may find it hard to meet the extra demands on your energy and time. Take it easy on yourself. Follow-up care is a key part of your treatment and safety. Be sure to make and go to all appointments, and call your doctor if you are having problems. It's also a good idea to know your test results and keep a list of the medicines you take. How can you care for yourself at home? · Vaginal bleeding and cramps  ¨ After delivery, you will have a bloody discharge from the vagina. This will turn pink within a week and then white or yellow after about 10 days. It may last for 2 to 4 weeks or longer, until the uterus has healed. Use pads instead of tampons until you stop bleeding. ¨ Do not worry if you pass some blood clots, as long as they are smaller than a golf ball. If you have a tear or stitches in your vaginal area, change the pad at least every 4 hours to prevent soreness and infection. ¨ You may have cramps for the first few days after childbirth. These are normal and occur as the uterus shrinks to normal size. Take an over-the-counter pain medicine, such as acetaminophen (Tylenol), ibuprofen (Advil, Motrin), or naproxen (Aleve), for cramps. Read and follow all instructions on the label. Do not take aspirin, because it can cause more bleeding. ¨ Do not take two or more pain medicines at the same time unless the doctor told you to. Many pain medicines have acetaminophen, which is Tylenol.  Too much acetaminophen (Tylenol) can be harmful. · Stitches  ¨ If you have stitches, they will dissolve on their own and do not need to be removed. Follow your doctor's instructions for cleaning the stitched area. ¨ Put ice or a cold pack on your painful area for 10 to 20 minutes at a time, several times a day, for the first few days. Put a thin cloth between the ice and your skin. ¨ Sit in a few inches of warm water (sitz bath) 3 times a day and after bowel movements. The warm water helps with pain and itching. If you do not have a tub, a warm shower might help. · Breast fullness  ¨ Your breasts may overfill (engorge) in the first few days after delivery. To help milk flow and to relieve pain, warm your breasts in the shower or by using warm, moist towels before nursing. ¨ If you are not nursing, do not put warmth on your breasts or touch your breasts. Wear a tight bra or sports bra and use ice until the fullness goes away. This usually takes 2 to 3 days. ¨ Put ice or a cold pack on your breast after nursing to reduce swelling and pain. Put a thin cloth between the ice and your skin. · Activity  ¨ Eat a balanced diet. Do not try to lose weight by cutting calories. Keep taking your prenatal vitamins, or take a multivitamin. ¨ Get as much rest as you can. Try to take naps when your baby sleeps during the day. ¨ Get some exercise every day. But do not do any heavy exercise until your doctor says it is okay. ¨ Wait until you are healed (about 4 to 6 weeks) before you have sexual intercourse. Your doctor will tell you when it is okay to have sex. ¨ Talk to your doctor about birth control. You can get pregnant even before your period returns. Also, you can get pregnant while you are breastfeeding. · Mental health  ¨ It is normal to have some sadness, anxiety, sleeplessness, and mood swings after you go home.  If you feel upset or hopeless for more than a few days or are having trouble doing the things you need to do, talk to your doctor. · Constipation and hemorrhoids  ¨ Drink plenty of fluids, enough so that your urine is light yellow or clear like water. If you have kidney, heart, or liver disease and have to limit fluids, talk with your doctor before you increase the amount of fluids you drink. ¨ Eat plenty of fiber each day. Have a bran muffin or bran cereal for breakfast, and try eating a piece of fruit for a mid-afternoon snack. ¨ For painful, itchy hemorrhoids, put ice or a cold pack on the area several times a day for 10 minutes at a time. Follow this by putting a warm compress on the area for another 10 to 20 minutes or by sitting in a shallow, warm bath. When should you call for help? Call 911 anytime you think you may need emergency care. For example, call if:  ? · You passed out (lost consciousness). ?Call your doctor now or seek immediate medical care if:  ? · You have severe vaginal bleeding. ? · You are dizzy or lightheaded, or you feel like you may faint. ? · You have a fever. ? · You have new or more pain in your belly or pelvis. ? Watch closely for changes in your health, and be sure to contact your doctor if:  ? · Your vaginal bleeding seems to be getting heavier. ? · You have new or worse vaginal discharge. ? · You feel sad, anxious, or hopeless for more than a few days. ? · You do not get better as expected. Where can you learn more? Go to http://matt-loren.info/. Enter K205 in the search box to learn more about \"Vaginal Childbirth: Care Instructions. \"  Current as of: March 16, 2017  Content Version: 11.4  © 1743-2811 [employer]. Care instructions adapted under license by [employer] (which disclaims liability or warranty for this information).  If you have questions about a medical condition or this instruction, always ask your healthcare professional. [address] any warranty or liability for your use of this information. After Your Delivery (the Postpartum Period): Care Instructions  Your Care Instructions    Congratulations on the birth of your baby. Like pregnancy, the  period can be a time of excitement, leesa, and exhaustion. You may look at your wondrous little baby and feel happy. You may also be overwhelmed by your new sleep hours and new responsibilities. At first, babies often sleep during the days and are awake at night. They do not have a pattern or routine. They may make sudden gasps, jerk themselves awake, or look like they have crossed eyes. These are all normal, and they may even make you smile. In these first weeks after delivery, try to take good care of yourself. It may take 4 to 6 weeks to feel like yourself again, and possibly longer if you had a  birth. You will likely feel very tired for several weeks. Your days will be full of ups and downs, but lots of leesa as well. Follow-up care is a key part of your treatment and safety. Be sure to make and go to all appointments, and call your doctor if you are having problems. It's also a good idea to know your test results and keep a list of the medicines you take. How can you care for yourself at home? Take care of your body after delivery  · Use pads instead of tampons for the bloody flow that may last as long as 2 weeks. · Ease cramps with ibuprofen (Advil, Motrin). · Ease soreness of hemorrhoids and the area between your vagina and rectum with ice compresses or witch hazel pads. · Ease constipation by drinking lots of fluid and eating high-fiber foods. Ask your doctor about over-the-counter stool softeners. · Cleanse yourself with a gentle squeeze of warm water from a bottle instead of wiping with toilet paper. · Take a sitz bath in warm water several times a day. · Wear a good nursing bra. Ease sore and swollen breasts with warm, wet washcloths.   · If you are not breastfeeding, use ice rather than heat for breast soreness. · Your period may not start for several months if you are breastfeeding. You may bleed more, and longer at first, than you did before you got pregnant. · Wait until you are healed (about 4 to 6 weeks) before you have sexual intercourse. Your doctor will tell you when it is okay to have sex. · Try not to travel with your baby for 5 or 6 weeks. If you take a long car trip, make frequent stops to walk around and stretch. Avoid exhaustion  · Rest every day. Try to nap when your baby naps. · Ask another adult to be with you for a few days after delivery. · Plan for  if you have other children. · Stay flexible so you can eat at odd hours and sleep when you need to. Both you and your baby are making new schedules. · Plan small trips to get out of the house. Change can make you feel less tired. · Ask for help with housework, cooking, and shopping. Remind yourself that your job is to care for your baby. Know about help for postpartum depression  · \"Baby blues\" are common for the first 1 to 2 weeks after birth. You may cry or feel sad or irritable for no reason. · Rest whenever you can. Being tired makes it harder to handle your emotions. · Go for walks with your baby. · Talk to your partner, friends, and family about your feelings. · If your symptoms last for more than a few weeks, or if you feel very depressed, ask your doctor for help. · Postpartum depression can be treated. Support groups and counseling can help. Sometimes medicine can also help. Stay healthy  · Eat healthy foods so you have more energy, make good breast milk, and lose extra baby pounds. · If you breastfeed, avoid alcohol and drugs. Stay smoke-free. If you quit during pregnancy, congratulations. · Start daily exercise after 4 to 6 weeks, but rest when you feel tired. · Learn exercises to tone your belly. Do Kegel exercises to regain strength in your pelvic muscles.  You can do these exercises while you stand or sit.  ¨ Squeeze the same muscles you would use to stop your urine. Your belly and thighs should not move. ¨ Hold the squeeze for 3 seconds, and then relax for 3 seconds. ¨ Start with 3 seconds. Then add 1 second each week until you are able to squeeze for 10 seconds. ¨ Repeat the exercise 10 to 15 times for each session. Do three or more sessions each day. · Find a class for new mothers and new babies that has an exercise time. · If you had a  birth, give yourself a bit more time before you exercise, and be careful. When should you call for help? Call 911 anytime you think you may need emergency care. For example, call if:  ? · You passed out (lost consciousness). ?Call your doctor now or seek immediate medical care if:  ? · You have severe vaginal bleeding. This means you are passing blood clots and soaking through a pad each hour for 2 or more hours. ? · You are dizzy or lightheaded, or you feel like you may faint. ? · You have a fever. ? · You have new belly pain, or your pain gets worse. ? Watch closely for changes in your health, and be sure to contact your doctor if:  ? · Your vaginal bleeding seems to be getting heavier. ? · You have new or worse vaginal discharge. ? · You feel sad, anxious, or hopeless for more than a few days. ? · You do not get better as expected. Where can you learn more? Go to http://matt-loren.info/. Enter A461 in the search box to learn more about \"After Your Delivery (the Postpartum Period): Care Instructions. \"  Current as of: 2017  Content Version: 11.4  © 2284-9807 [email or web address]. Care instructions adapted under license by [employer] (which disclaims liability or warranty for this information).  If you have questions about a medical condition or this instruction, always ask your healthcare professional. [place] [address] any warranty or liability for your use of this information.

## 2017-11-14 NOTE — PROGRESS NOTES
Bedside report received from [facility] RN. Pt care assumed. Pt encouraged to call with needs.

## 2017-11-14 NOTE — L&D DELIVERY NOTE
Delivery Summary    Patient: Talya Pena MRN: [id number]  SSN: [id number]    YOB: 1985  Age: 34 y.o. Sex: female       Information for the patient's :  Jeremiah Guy [[id number]]       Labor Events:    Labor: No   Rupture Date: 2017   Rupture Time: 4:14 PM   Rupture Type AROM   Amniotic Fluid Volume: Moderate    Amniotic Fluid Description: Meconium None   Induction: AROM       Augmentation: None   Labor Events: None     Cervical Ripening:     None     Delivery Events:  Episiotomy: None   Laceration(s): First degree perineal     Repaired: Yes    Number of Repair Packets: 1   Suture Type and Size: Chromic 3-0     Estimated Blood Loss (ml):  ml       Delivery Date: 2017    Delivery Time: 4:20 PM  Delivery Type: Vaginal, Spontaneous Delivery  Sex:  Female     Gestational Age: 44w9d   Delivery Clinician:  Rajeev Donaldson  Living Status: Living   Delivery Location: [facility]          APGARS  One minute Five minutes Ten minutes   Skin color: 1   1        Heart rate: 2   2        Grimace: 2   2        Muscle tone: 2   2        Breathin   2        Totals: 9   9            Presentation: Vertex    Position: Left Occiput Anterior  Resuscitation Method:  Suctioning-bulb;Suctioning-deep     Meconium Stained: Thin      Cord Vessels: 3 Vessels      Cord Events:    Cord Blood Sent?:  Yes    Blood Gases Sent?:  Yes    Placenta:  Date/Time:   4:25 PM  Removal: Spontaneous      Appearance: Normal;Intact     [place] Measurements:  Birth Weight: 9 lb 6.4 oz (4.265 kg)      Birth Length: 54.5 cm      Head Circumference: 35.5 cm      Chest Circumference: 35.5 cm     Abdominal Girth: Other Providers:   RAJEEV DONALDSON;SD STEEN;LAURIE MARQUEZ;GIANA ORNELAS;Chris MORALES ABIGAIL C, Obstetrician;Primary Nurse;Neonatologist;Scrub Tech; Anesthesiologist;Respiratory Therapist           Group B Strep:   Lab Results   Component Value Date/Time    George External Negative 2017     Information for the patient's :  Merissa Laird [[id number]]     Lab Results   Component Value Date/Time    ABO/Rh(D) O POSITIVE 2017 04:20 PM    ESCOBAR IgG NEG 2017 04:20 PM     Lab Results   Component Value Date/Time    APH 7.313 (H) 2017 04:20 PM    APCO2 49 2017 04:20 PM    APO2 16 2017 04:20 PM    AHCO3 24 2017 04:20 PM    ABDC 2.4 (H) 2017 04:20 PM    EPHV 7.404 2017 04:20 PM    PCO2V 35 2017 04:20 PM    PO2V 30 (L) 2017 04:20 PM    HCO3V 21 2017 04:20 PM    EBDV 2.6 2017 04:20 PM    SITE CORD 2017 04:20 PM    SITE CORD 2017 04:20 PM    RSCOM na at 2017 4 44 24 PM. Not read back. 2017 04:20 PM    RSCOM na at 2017 4 44 46 PM. Not read back.  2017 04:20 PM

## 2017-12-13 PROBLEM — Z34.80 OTHER NORMAL PREGNANCY, NOT FIRST: Status: RESOLVED | Noted: 2017-05-31 | Resolved: 2017-12-13

## 2017-12-13 PROBLEM — Z34.83 MULTIGRAVIDA IN THIRD TRIMESTER: Status: RESOLVED | Noted: 2017-10-17 | Resolved: 2017-12-13

## 2019-05-13 PROBLEM — Z64.1 GRAND MULTIPARA: Status: ACTIVE | Noted: 2019-05-13

## 2019-05-13 PROBLEM — O28.3 ABNORMAL FETAL ULTRASOUND: Status: ACTIVE | Noted: 2019-05-13

## 2019-05-13 PROBLEM — O09.299 HISTORY OF MACROSOMIA IN INFANT IN PRIOR PREGNANCY, CURRENTLY PREGNANT: Status: ACTIVE | Noted: 2019-05-13

## 2019-05-13 PROBLEM — O09.529 ANTEPARTUM MULTIGRAVIDA OF ADVANCED MATERNAL AGE: Status: ACTIVE | Noted: 2019-05-13

## 2019-05-13 PROBLEM — J30.1 SEASONAL ALLERGIC RHINITIS DUE TO POLLEN: Status: RESOLVED | Noted: 2017-05-31 | Resolved: 2019-05-13

## 2019-05-14 PROBLEM — O35.8XX0 CYSTIC HYGROMA OF FETUS IN SINGLETON PREGNANCY: Status: ACTIVE | Noted: 2019-05-14

## 2019-05-14 PROBLEM — O09.41 SUPERVISION OF HIGH-RISK PREGNANCY WITH GRAND MULTIPARITY IN FIRST TRIMESTER: Status: ACTIVE | Noted: 2019-05-13

## 2019-05-14 PROBLEM — O09.521 MULTIGRAVIDA OF ADVANCED MATERNAL AGE IN FIRST TRIMESTER: Status: ACTIVE | Noted: 2019-05-13

## 2019-05-14 PROBLEM — O28.3 ABNORMAL FETAL ULTRASOUND: Status: RESOLVED | Noted: 2019-05-13 | Resolved: 2019-05-14

## 2019-05-26 ENCOUNTER — HOSPITAL ENCOUNTER (OUTPATIENT)
Age: 34
Discharge: HOME | End: 2019-05-26
Attending: OBSTETRICS & GYNECOLOGY | Admitting: OBSTETRICS & GYNECOLOGY
Payer: COMMERCIAL

## 2019-05-26 ENCOUNTER — APPOINTMENT (OUTPATIENT)
Dept: ULTRASOUND IMAGING | Age: 34
End: 2019-05-26
Attending: EMERGENCY MEDICINE
Payer: COMMERCIAL

## 2019-05-26 VITALS
SYSTOLIC BLOOD PRESSURE: 106 MMHG | TEMPERATURE: 99 F | WEIGHT: 155 LBS | HEIGHT: 65 IN | RESPIRATION RATE: 20 BRPM | DIASTOLIC BLOOD PRESSURE: 63 MMHG | HEART RATE: 96 BPM | OXYGEN SATURATION: 99 % | BODY MASS INDEX: 25.83 KG/M2

## 2019-05-26 DIAGNOSIS — O02.1 MISSED ABORTION WITH FETAL DEMISE BEFORE 20 COMPLETED WEEKS OF GESTATION: Primary | ICD-10-CM

## 2019-05-26 LAB
ALBUMIN SERPL-MCNC: 3.7 G/DL (ref 3.5–5)
ALBUMIN/GLOB SERPL: 0.9 {RATIO} (ref 1.2–3.5)
ALP SERPL-CCNC: 61 U/L (ref 50–130)
ALT SERPL-CCNC: 15 U/L (ref 12–65)
ANION GAP SERPL CALC-SCNC: 10 MMOL/L (ref 7–16)
AST SERPL-CCNC: 15 U/L (ref 15–37)
BASOPHILS # BLD: 0.1 K/UL (ref 0–0.2)
BASOPHILS NFR BLD: 1 % (ref 0–2)
BILIRUB SERPL-MCNC: 0.4 MG/DL (ref 0.2–1.1)
BUN SERPL-MCNC: 7 MG/DL (ref 6–23)
CALCIUM SERPL-MCNC: 8.5 MG/DL (ref 8.3–10.4)
CHLORIDE SERPL-SCNC: 105 MMOL/L (ref 98–107)
CO2 SERPL-SCNC: 23 MMOL/L (ref 21–32)
CREAT SERPL-MCNC: 0.67 MG/DL (ref 0.6–1)
DIFFERENTIAL METHOD BLD: NORMAL
EOSINOPHIL # BLD: 0.1 K/UL (ref 0–0.8)
EOSINOPHIL NFR BLD: 1 % (ref 0.5–7.8)
ERYTHROCYTE [DISTWIDTH] IN BLOOD BY AUTOMATED COUNT: 12.6 % (ref 11.9–14.6)
GLOBULIN SER CALC-MCNC: 4.1 G/DL (ref 2.3–3.5)
GLUCOSE SERPL-MCNC: 89 MG/DL (ref 65–100)
HCG UR QL: POSITIVE
HCT VFR BLD AUTO: 39.6 % (ref 35.8–46.3)
HGB BLD-MCNC: 13.7 G/DL (ref 11.7–15.4)
IMM GRANULOCYTES # BLD AUTO: 0 K/UL (ref 0–0.5)
IMM GRANULOCYTES NFR BLD AUTO: 0 % (ref 0–5)
LYMPHOCYTES # BLD: 1.7 K/UL (ref 0.5–4.6)
LYMPHOCYTES NFR BLD: 25 % (ref 13–44)
MCH RBC QN AUTO: 30.6 PG (ref 26.1–32.9)
MCHC RBC AUTO-ENTMCNC: 34.6 G/DL (ref 31.4–35)
MCV RBC AUTO: 88.4 FL (ref 79.6–97.8)
MONOCYTES # BLD: 0.4 K/UL (ref 0.1–1.3)
MONOCYTES NFR BLD: 6 % (ref 4–12)
NEUTS SEG # BLD: 4.5 K/UL (ref 1.7–8.2)
NEUTS SEG NFR BLD: 67 % (ref 43–78)
NRBC # BLD: 0 K/UL (ref 0–0.2)
PLATELET # BLD AUTO: 294 K/UL (ref 150–450)
PMV BLD AUTO: 10.1 FL (ref 9.4–12.3)
POTASSIUM SERPL-SCNC: 3.4 MMOL/L (ref 3.5–5.1)
PROT SERPL-MCNC: 7.8 G/DL (ref 6.3–8.2)
RBC # BLD AUTO: 4.48 M/UL (ref 4.05–5.2)
SODIUM SERPL-SCNC: 138 MMOL/L (ref 136–145)
WBC # BLD AUTO: 6.7 K/UL (ref 4.3–11.1)

## 2019-05-26 PROCEDURE — 76815 OB US LIMITED FETUS(S): CPT

## 2019-05-26 PROCEDURE — 80053 COMPREHEN METABOLIC PANEL: CPT

## 2019-05-26 PROCEDURE — 74011250636 HC RX REV CODE- 250/636: Performed by: EMERGENCY MEDICINE

## 2019-05-26 PROCEDURE — 81025 URINE PREGNANCY TEST: CPT

## 2019-05-26 PROCEDURE — 81003 URINALYSIS AUTO W/O SCOPE: CPT | Performed by: EMERGENCY MEDICINE

## 2019-05-26 PROCEDURE — 96374 THER/PROPH/DIAG INJ IV PUSH: CPT | Performed by: EMERGENCY MEDICINE

## 2019-05-26 PROCEDURE — 99284 EMERGENCY DEPT VISIT MOD MDM: CPT | Performed by: EMERGENCY MEDICINE

## 2019-05-26 PROCEDURE — 85025 COMPLETE CBC W/AUTO DIFF WBC: CPT

## 2019-05-26 RX ORDER — KETOROLAC TROMETHAMINE 30 MG/ML
15 INJECTION, SOLUTION INTRAMUSCULAR; INTRAVENOUS
Status: COMPLETED | OUTPATIENT
Start: 2019-05-26 | End: 2019-05-26

## 2019-05-26 RX ORDER — MORPHINE SULFATE 15 MG/1
15 TABLET ORAL
Qty: 6 TAB | Refills: 0 | Status: SHIPPED | OUTPATIENT
Start: 2019-05-26 | End: 2019-05-28

## 2019-05-26 RX ADMIN — KETOROLAC TROMETHAMINE 15 MG: 30 INJECTION, SOLUTION INTRAMUSCULAR at 23:04

## 2019-05-26 NOTE — ED TRIAGE NOTES
Pt is complaining of lower abdominal pain that started 2 hours ago. She is pregnant and has been cleared by OB upstairs. No other symptoms.

## 2019-05-26 NOTE — ED PROVIDER NOTES
12 weeks pregnant with lower abdominal pain and cramping in nature for 2 hours. Denies aggravating or alleviating factors. When upstairs to labor and delivery for delivery that was sent down here. She denies any vaginal bleeding or vaginal discharge. The history is provided by the patient. Abdominal Pain    This is a new problem. The current episode started 1 to 2 hours ago. The problem occurs constantly. The problem has not changed since onset. Pain location: lower abdomen. The quality of the pain is cramping. The pain is moderate. Pertinent negatives include no fever, no diarrhea, no nausea, no vomiting, no constipation, no dysuria, no frequency and no back pain. Nothing worsens the pain. The pain is relieved by nothing.         Past Medical History:   Diagnosis Date    History of delivery of macrosomal infant     Palpitations 11/10/2015       Past Surgical History:   Procedure Laterality Date    HX WISDOM TEETH EXTRACTION           Family History:   Problem Relation Age of Onset    No Known Problems Mother     No Known Problems Father     No Known Problems Sister     No Known Problems Brother     No Known Problems Maternal Aunt     No Known Problems Maternal Uncle     No Known Problems Paternal Aunt     No Known Problems Paternal Uncle     No Known Problems Maternal Grandmother     No Known Problems Maternal Grandfather     No Known Problems Paternal Grandmother     No Known Problems Paternal Grandfather        Social History     Socioeconomic History    Marital status:      Spouse name: Not on file    Number of children: Not on file    Years of education: Not on file    Highest education level: Not on file   Occupational History    Not on file   Social Needs    Financial resource strain: Not on file    Food insecurity:     Worry: Not on file     Inability: Not on file    Transportation needs:     Medical: Not on file     Non-medical: Not on file   Tobacco Use    Smoking status: Never Smoker    Smokeless tobacco: Never Used   Substance and Sexual Activity    Alcohol use: No    Drug use: No    Sexual activity: Yes     Partners: Male     Birth control/protection: None   Lifestyle    Physical activity:     Days per week: Not on file     Minutes per session: Not on file    Stress: Not on file   Relationships    Social connections:     Talks on phone: Not on file     Gets together: Not on file     Attends Church service: Not on file     Active member of club or organization: Not on file     Attends meetings of clubs or organizations: Not on file     Relationship status: Not on file    Intimate partner violence:     Fear of current or ex partner: Not on file     Emotionally abused: Not on file     Physically abused: Not on file     Forced sexual activity: Not on file   Other Topics Concern    Not on file   Social History Narrative    Not on file         ALLERGIES: Patient has no known allergies. Review of Systems   Constitutional: Negative for fever. Gastrointestinal: Positive for abdominal pain. Negative for constipation, diarrhea, nausea and vomiting. Genitourinary: Negative for dysuria and frequency. Musculoskeletal: Negative for back pain. Vitals:    05/26/19 1814 05/26/19 1837   BP: 119/80 117/66   Pulse: 99 94   Resp: 16 18   Temp: 98.3 °F (36.8 °C) 98.8 °F (37.1 °C)   SpO2:  100%   Weight:  70.3 kg (155 lb)   Height:  5' 5\" (1.651 m)            Physical Exam   Constitutional: She appears well-developed and well-nourished. No distress. HENT:   Mouth/Throat: Oropharynx is clear and moist.   Eyes: Pupils are equal, round, and reactive to light. Conjunctiva normal   Cardiovascular: Normal rate, regular rhythm and normal heart sounds. Pulmonary/Chest: Effort normal and breath sounds normal.   Abdominal: Soft. Normal appearance. She exhibits no distension. There is no tenderness. Skin: Skin is warm and dry. Nursing note and vitals reviewed. MDM  Number of Diagnoses or Management Options  Diagnosis management comments: Check labs and urine. Abdomen benign. No bleeding or discharge. Known intrauterine pregnancy at 12 weeks. Possible cystic hygroma noted at last visit and was referred to maternal-fetal medicine. Amount and/or Complexity of Data Reviewed  Clinical lab tests: reviewed (Blood type is A+)  Tests in the radiology section of CPT®: ordered and reviewed ([address]    Result Date: 5/26/2019  Pelvis ultrasound INDICATION: 12 week pregnant, lower abdominal pain. Question fetal demise, question ovarian cyst. COMPARISON: None. TECHNIQUE: Transabdominal and transvaginal ultrasound was performed of the pelvis FINDINGS: Transabdominal findings: Uterus measures 11.2 x 10.8 x 8.8 cm. There is an intrauterine pregnancy. Crown-rump length measures 3.9 cm, corresponding to gestational age of 11 week 6 day. Fetal heart rate is not detected. Right ovary is unremarkable and measures 3.9 x 2.2 x 1.8 cm. Left ovary is not visualized. There is no significant free fluid. Intrauterine pregnancy with estimated gestational age of 11 week 6 day. Fetal heart rate is not detected. Findings suspicious for fetal demise. Beta hCG result is not available at this time and no prior recent ultrasound for comparison. Short-term ultrasound and serial beta hCG follow-up are recommended.  OB/GYN consult is advised, if not already obtained.    )  Discuss the patient with other providers: yes           Procedures

## 2019-05-27 NOTE — DISCHARGE INSTRUCTIONS
Patient Education      Tylenol 500-1000 mg every 6 hours for pain. Ibuprofen 400-600 mg every 6 hours for pain. Alternate them every 3-4 hours for best results. Morphine 1 tablet every 6 hours if needed for breakthrough pain for 1-2 days. Follow-up with your OB/GYN on Tuesday for further evaluation. Incomplete Miscarriage: Care Instructions  Your Care Instructions    A miscarriage is the loss of a pregnancy during the first 20 weeks. Miscarriages are very common. Most happen because the fertilized egg in the uterus does not develop normally. Stress, exercise, or sex does not cause a miscarriage. While many miscarriages pass on their own, some do not. These are called incomplete miscarriages because all of the tissue related to pregnancy is not shed from the uterus. An incomplete miscarriage often requires treatment. Medicine or a procedure call dilation and curettage (D&C) is used to clear the tissue from the uterus. If your blood type is Rh negative, ask your doctor if you need a shot of Rh immune globulin (RhoGAM) to prevent problems in future pregnancies. Follow-up care is a key part of your treatment and safety. Be sure to make and go to all appointments, and call your doctor if you are having problems. It's also a good idea to know your test results and keep a list of the medicines you take. How can you care for yourself at home? · You will probably have vaginal bleeding for 1 to 2 weeks after treatment. It may be similar to or slightly heavier than a normal period. Use pads instead of tampons. You may use tampons during your next period. It should start in 3 to 6 weeks. · Take an over-the-counter pain medicine, such as acetaminophen (Tylenol), ibuprofen (Advil, Motrin), or naproxen (Aleve), for cramps. You may have cramps for several days after the miscarriage. Be safe with medicines. Read and follow all instructions on the label.   · Do not take two or more pain medicines at the same time unless the doctor told you to. Many pain medicines have acetaminophen, which is Tylenol. Too much acetaminophen (Tylenol) can be harmful. · Your doctor may ask you to use a clear container to save any tissue or clots that you pass. Take it to your doctor's office right away. · Do not have sex until the bleeding stops. · You may return to your normal activities if you feel well enough to do so. But you should avoid heavy exercise until the bleeding stops. · If you plan to get pregnant again, check with your doctor. Most doctors suggest waiting until you have had at least one normal period before you try to get pregnant. · If you do not want to get pregnant, ask your doctor about birth control. You can get pregnant again before your next period starts. · You may be low in iron because of blood loss. Eat a balanced diet that is high in iron and vitamin C. Foods rich in iron include red meat, shellfish, eggs, beans, and leafy green vegetables. Talk to your doctor about whether you need to take iron pills or a multivitamin. · The loss of a pregnancy can be very hard. Give yourself and your partner time to grieve. Even if your miscarriage occurred very early, you may still have feelings of loss. You may wonder why it happened and blame yourself. ? Talking to family members, friends, or a counselor may help you cope with your loss. ? If your feelings of sadness last longer than 2 weeks, tell your doctor or a counselor. When should you call for help? Call 911 anytime you think you may need emergency care.  For example, call if:    · You passed out (lost consciousness).    Call your doctor now or seek immediate medical care if:    · You have severe vaginal bleeding.     · You are dizzy or lightheaded, or you feel like you may faint.     · You have a fever.     · You have new or worse pain in your belly or pelvis.     · You have vaginal discharge that smells bad.    Watch closely for changes in your health, and be sure to contact your doctor if:    · You do not get better as expected. Where can you learn more? Go to http://matt-loren.info/. Enter L103 in the search box to learn more about \"Incomplete Miscarriage: Care Instructions. \"  Current as of: September 5, 2018  Content Version: 11.9  © 7181-4564 [employer]. Care instructions adapted under license by [employer] (which disclaims liability or warranty for this information). If you have questions about a medical condition or this instruction, always ask your healthcare professional. [address] any warranty or liability for your use of this information.

## 2019-05-27 NOTE — ED NOTES
I have reviewed discharge instructions with the patient and spouse. The patient and spouse verbalized understanding. Patient left ED via Discharge Method: ambulatory to Home with spouse. Opportunity for questions and clarification provided. Patient given 1 scripts. To continue your aftercare when you leave the hospital, you may receive an automated call from our care team to check in on how you are doing. This is a free service and part of our promise to provide the best care and service to meet your aftercare needs.  If you have questions, or wish to unsubscribe from this service please call [phone]. Thank you for Choosing our [facility] Emergency Department.

## 2019-09-18 ENCOUNTER — APPOINTMENT (OUTPATIENT)
Dept: GENERAL RADIOLOGY | Age: 34
End: 2019-09-18
Attending: EMERGENCY MEDICINE
Payer: COMMERCIAL

## 2019-09-18 ENCOUNTER — HOSPITAL ENCOUNTER (EMERGENCY)
Age: 34
Discharge: HOME | End: 2019-09-18
Attending: EMERGENCY MEDICINE
Payer: COMMERCIAL

## 2019-09-18 ENCOUNTER — APPOINTMENT (OUTPATIENT)
Dept: CT IMAGING | Age: 34
End: 2019-09-18
Attending: EMERGENCY MEDICINE
Payer: COMMERCIAL

## 2019-09-18 VITALS
RESPIRATION RATE: 18 BRPM | SYSTOLIC BLOOD PRESSURE: 112 MMHG | DIASTOLIC BLOOD PRESSURE: 59 MMHG | HEART RATE: 75 BPM | HEIGHT: 65 IN | WEIGHT: 150 LBS | BODY MASS INDEX: 24.99 KG/M2 | OXYGEN SATURATION: 100 % | TEMPERATURE: 98.6 F

## 2019-09-18 DIAGNOSIS — R07.89 ATYPICAL CHEST PAIN: ICD-10-CM

## 2019-09-18 DIAGNOSIS — R06.00 DYSPNEA, UNSPECIFIED TYPE: Primary | ICD-10-CM

## 2019-09-18 LAB
ALBUMIN SERPL-MCNC: 4.1 G/DL (ref 3.5–5)
ALBUMIN/GLOB SERPL: 1.1 {RATIO} (ref 1.2–3.5)
ALP SERPL-CCNC: 61 U/L (ref 50–136)
ALT SERPL-CCNC: 19 U/L (ref 12–65)
ANION GAP SERPL CALC-SCNC: 4 MMOL/L (ref 7–16)
AST SERPL-CCNC: 16 U/L (ref 15–37)
ATRIAL RATE: 82 BPM
BASOPHILS # BLD: 0 K/UL (ref 0–0.2)
BASOPHILS NFR BLD: 1 % (ref 0–2)
BILIRUB SERPL-MCNC: 0.5 MG/DL (ref 0.2–1.1)
BUN SERPL-MCNC: 9 MG/DL (ref 6–23)
CALCIUM SERPL-MCNC: 9.8 MG/DL (ref 8.3–10.4)
CALCULATED P AXIS, ECG09: 53 DEGREES
CALCULATED R AXIS, ECG10: 55 DEGREES
CALCULATED T AXIS, ECG11: 32 DEGREES
CHLORIDE SERPL-SCNC: 108 MMOL/L (ref 98–107)
CO2 SERPL-SCNC: 25 MMOL/L (ref 21–32)
CREAT SERPL-MCNC: 0.74 MG/DL (ref 0.6–1)
D DIMER PPP FEU-MCNC: 0.49 UG/ML(FEU)
DIAGNOSIS, 93000: NORMAL
DIFFERENTIAL METHOD BLD: ABNORMAL
EOSINOPHIL # BLD: 0 K/UL (ref 0–0.8)
EOSINOPHIL NFR BLD: 0 % (ref 0.5–7.8)
ERYTHROCYTE [DISTWIDTH] IN BLOOD BY AUTOMATED COUNT: 11.9 % (ref 11.9–14.6)
GLOBULIN SER CALC-MCNC: 3.8 G/DL (ref 2.3–3.5)
GLUCOSE SERPL-MCNC: 102 MG/DL (ref 65–100)
HCG SERPL-ACNC: 2731 MIU/ML (ref 0–6)
HCG UR QL: POSITIVE
HCT VFR BLD AUTO: 39 % (ref 35.8–46.3)
HGB BLD-MCNC: 13 G/DL (ref 11.7–15.4)
IMM GRANULOCYTES # BLD AUTO: 0 K/UL (ref 0–0.5)
IMM GRANULOCYTES NFR BLD AUTO: 0 % (ref 0–5)
LYMPHOCYTES # BLD: 0.9 K/UL (ref 0.5–4.6)
LYMPHOCYTES NFR BLD: 12 % (ref 13–44)
MCH RBC QN AUTO: 29.3 PG (ref 26.1–32.9)
MCHC RBC AUTO-ENTMCNC: 33.3 G/DL (ref 31.4–35)
MCV RBC AUTO: 88 FL (ref 79.6–97.8)
MONOCYTES # BLD: 0.3 K/UL (ref 0.1–1.3)
MONOCYTES NFR BLD: 5 % (ref 4–12)
NEUTS SEG # BLD: 5.9 K/UL (ref 1.7–8.2)
NEUTS SEG NFR BLD: 82 % (ref 43–78)
NRBC # BLD: 0 K/UL (ref 0–0.2)
P-R INTERVAL, ECG05: 126 MS
PLATELET # BLD AUTO: 311 K/UL (ref 150–450)
PMV BLD AUTO: 9.9 FL (ref 9.4–12.3)
POTASSIUM SERPL-SCNC: 3.7 MMOL/L (ref 3.5–5.1)
PROT SERPL-MCNC: 7.9 G/DL (ref 6.3–8.2)
Q-T INTERVAL, ECG07: 362 MS
QRS DURATION, ECG06: 98 MS
QTC CALCULATION (BEZET), ECG08: 422 MS
RBC # BLD AUTO: 4.43 M/UL (ref 4.05–5.2)
SODIUM SERPL-SCNC: 137 MMOL/L (ref 136–145)
TROPONIN I SERPL-MCNC: <0.02 NG/ML (ref 0.02–0.05)
VENTRICULAR RATE, ECG03: 82 BPM
WBC # BLD AUTO: 7.2 K/UL (ref 4.3–11.1)

## 2019-09-18 PROCEDURE — 96360 HYDRATION IV INFUSION INIT: CPT | Performed by: EMERGENCY MEDICINE

## 2019-09-18 PROCEDURE — 85025 COMPLETE CBC W/AUTO DIFF WBC: CPT

## 2019-09-18 PROCEDURE — 71260 CT THORAX DX C+: CPT

## 2019-09-18 PROCEDURE — 96361 HYDRATE IV INFUSION ADD-ON: CPT | Performed by: EMERGENCY MEDICINE

## 2019-09-18 PROCEDURE — 93005 ELECTROCARDIOGRAM TRACING: CPT | Performed by: EMERGENCY MEDICINE

## 2019-09-18 PROCEDURE — 74011000258 HC RX REV CODE- 258: Performed by: EMERGENCY MEDICINE

## 2019-09-18 PROCEDURE — 74011250636 HC RX REV CODE- 250/636: Performed by: EMERGENCY MEDICINE

## 2019-09-18 PROCEDURE — 85379 FIBRIN DEGRADATION QUANT: CPT

## 2019-09-18 PROCEDURE — 74011636320 HC RX REV CODE- 636/320: Performed by: EMERGENCY MEDICINE

## 2019-09-18 PROCEDURE — 84484 ASSAY OF TROPONIN QUANT: CPT

## 2019-09-18 PROCEDURE — 99284 EMERGENCY DEPT VISIT MOD MDM: CPT | Performed by: EMERGENCY MEDICINE

## 2019-09-18 PROCEDURE — 81025 URINE PREGNANCY TEST: CPT

## 2019-09-18 PROCEDURE — 71045 X-RAY EXAM CHEST 1 VIEW: CPT

## 2019-09-18 PROCEDURE — 81003 URINALYSIS AUTO W/O SCOPE: CPT | Performed by: EMERGENCY MEDICINE

## 2019-09-18 PROCEDURE — 84702 CHORIONIC GONADOTROPIN TEST: CPT

## 2019-09-18 PROCEDURE — 80053 COMPREHEN METABOLIC PANEL: CPT

## 2019-09-18 RX ORDER — SODIUM CHLORIDE 0.9 % (FLUSH) 0.9 %
10 SYRINGE (ML) INJECTION
Status: COMPLETED | OUTPATIENT
Start: 2019-09-18 | End: 2019-09-18

## 2019-09-18 RX ADMIN — SODIUM CHLORIDE 1000 ML: 900 INJECTION, SOLUTION INTRAVENOUS at 17:29

## 2019-09-18 RX ADMIN — IOPAMIDOL 80 ML: 755 INJECTION, SOLUTION INTRAVENOUS at 18:08

## 2019-09-18 RX ADMIN — Medication 10 ML: at 18:09

## 2019-09-18 RX ADMIN — SODIUM CHLORIDE 100 ML: 900 INJECTION, SOLUTION INTRAVENOUS at 18:09

## 2019-09-18 NOTE — ED NOTES
Patient still with some slight shortness of breath studies are negative so far. Patient has discussion as to further imaging including a CT scan. She will be shielded well wishes to proceed with this. Explained risk as well.

## 2019-09-18 NOTE — ED TRIAGE NOTES
Patient arrives via EMS from a car in a parking lot, complaining of shortness of breath. This started since 23:00 last night. She reports feeling anxious. Patient denies chest pain and denies abdominal pain. EMS found BGL to be 166 mg/dL. Patient reports not having had this problem before.

## 2019-09-18 NOTE — ED NOTES
I have reviewed discharge instructions with the patient. The patient verbalized understanding. Patient left ED via Discharge Method: ambulatory to Home with . Opportunity for questions and clarification provided. Patient given 0 scripts. Pt in no acute distress at discharge. To continue your aftercare when you leave the hospital, you may receive an automated call from our care team to check in on how you are doing. This is a free service and part of our promise to provide the best care and service to meet your aftercare needs.  If you have questions, or wish to unsubscribe from this service please call [phone]. Thank you for Choosing our [facility] Emergency Department.

## 2019-09-18 NOTE — ED NOTES
I assumed care of patient with CT scan of chest pending. CT scan of chest shows no acute abnormalities including no PE. Possible trace pleural effusions noted. Discussed with patient results of testing. She has been asymptomatic here. She is sitting up, talking without any tachypnea, hypoxia or tachycardia. She appears stable for discharge and outpatient follow-up.   Did discuss immediate return to the ER precautions

## 2019-09-18 NOTE — DISCHARGE INSTRUCTIONS
As we discussed, your testing today shows no signs of any serious or life-threatening illnesses  Get plenty of rest, stay adequately hydrated  Follow-up with your OB/GYN  Return to the ER for any new or worsening symptoms    Chest Pain: Care Instructions  Your Care Instructions    There are many things that can cause chest pain. Some are not serious and will get better on their own in a few days. But some kinds of chest pain need more testing and treatment. Your doctor may have recommended a follow-up visit in the next 8 to 12 hours. If you are not getting better, you may need more tests or treatment. Even though your doctor has released you, you still need to watch for any problems. The doctor carefully checked you, but sometimes problems can develop later. If you have new symptoms or if your symptoms do not get better, get medical care right away. If you have worse or different chest pain or pressure that lasts more than 5 minutes or you passed out (lost consciousness), call 911 or seek other emergency help right away. A medical visit is only one step in your treatment. Even if you feel better, you still need to do what your doctor recommends, such as going to all suggested follow-up appointments and taking medicines exactly as directed. This will help you recover and help prevent future problems. How can you care for yourself at home? · Rest until you feel better. · Take your medicine exactly as prescribed. Call your doctor if you think you are having a problem with your medicine. · Do not drive after taking a prescription pain medicine. When should you call for help? Call 911 if:    · You passed out (lost consciousness).     · You have severe difficulty breathing.     · You have symptoms of a heart attack. These may include:  ? Chest pain or pressure, or a strange feeling in your chest.  ? Sweating. ? Shortness of breath. ? Nausea or vomiting.   ? Pain, pressure, or a strange feeling in your back, neck, jaw, or upper belly or in one or both shoulders or arms. ? Lightheadedness or sudden weakness. ? A fast or irregular heartbeat. After you call 911, the  may tell you to chew 1 adult-strength or 2 to 4 low-dose aspirin. Wait for an ambulance. Do not try to drive yourself.    Call your doctor today if:    · You have any trouble breathing.     · Your chest pain gets worse.     · You are dizzy or lightheaded, or you feel like you may faint.     · You are not getting better as expected.     · You are having new or different chest pain. Where can you learn more? Go to http://mattHOMETRAXloren.info/. Enter A120 in the search box to learn more about \"Chest Pain: Care Instructions. \"  Current as of: September 23, 2018  Content Version: 12.1  © 5853-2359 [employer]. Care instructions adapted under license by [employer] (which disclaims liability or warranty for this information). If you have questions about a medical condition or this instruction, always ask your healthcare professional. [place] [address] any warranty or liability for your use of this information. Patient Education        Shortness of Breath: Care Instructions  Your Care Instructions  Shortness of breath has many causes. Sometimes conditions such as anxiety can lead to shortness of breath. Some people get mild shortness of breath when they exercise. Trouble breathing also can be a symptom of a serious problem, such as asthma, lung disease, emphysema, heart problems, and pneumonia. If your shortness of breath continues, you may need tests and treatment. Watch for any changes in your breathing and other symptoms. Follow-up care is a key part of your treatment and safety. Be sure to make and go to all appointments, and call your doctor if you are having problems. It's also a good idea to know your test results and keep a list of the medicines you take.   How can you care for yourself at home? · Do not smoke or allow others to smoke around you. If you need help quitting, talk to your doctor about stop-smoking programs and medicines. These can increase your chances of quitting for good. · Get plenty of rest and sleep. · Take your medicines exactly as prescribed. Call your doctor if you think you are having a problem with your medicine. · Find healthy ways to deal with stress. ? Exercise daily. ? Get plenty of sleep. ? Eat regularly and well. When should you call for help? Call 911 anytime you think you may need emergency care. For example, call if:    · You have severe shortness of breath.     · You have symptoms of a heart attack. These may include:  ? Chest pain or pressure, or a strange feeling in the chest.  ? Sweating. ? Shortness of breath. ? Nausea or vomiting. ? Pain, pressure, or a strange feeling in the back, neck, jaw, or upper belly or in one or both shoulders or arms. ? Lightheadedness or sudden weakness. ? A fast or irregular heartbeat. After you call 911, the  may tell you to chew 1 adult-strength or 2 to 4 low-dose aspirin. Wait for an ambulance. Do not try to drive yourself.    Call your doctor now or seek immediate medical care if:    · Your shortness of breath gets worse or you start to wheeze. Wheezing is a high-pitched sound when you breathe.     · You wake up at night out of breath or have to prop your head up on several pillows to breathe.     · You are short of breath after only light activity or while at rest.    Watch closely for changes in your health, and be sure to contact your doctor if:    · You do not get better over the next 1 to 2 days. Where can you learn more? Go to http://matt-loren.info/. Enter S780 in the search box to learn more about \"Shortness of Breath: Care Instructions. \"  Current as of: September 5, 2018  Content Version: 12.1  © 6560-4053 [employer].  Care instructions adapted under license by [employer] (which disclaims liability or warranty for this information). If you have questions about a medical condition or this instruction, always ask your healthcare professional. [address] any warranty or liability for your use of this information.

## 2019-09-18 NOTE — ED PROVIDER NOTES
Woke up and was essentially at her norm (except possibly slight SOB last night) but started to feel poorly today had contacted her  planning to come to the emergency room. He arrived home was driving her to the hospital when she became profoundly short of breath and with this became diaphoretic. This is now somewhat better than it was. No pleuritic pain. No extremity swelling. No cardiac history. She had her last normal menstrual period approximately August 15. She has had 5 pregnancies to term in the past.  No cardiac history. Denies any recent fever or infectious changes. Prior to this morning it been entirely at her baseline. No hx PE or DVT. No recent trip or travel. The history is provided by the patient. Shortness of Breath   This is a new problem. Pertinent negatives include no fever, no rhinorrhea, no cough, no sputum production, no hemoptysis, no wheezing, no syncope, no abdominal pain, no leg pain and no leg swelling. She has tried nothing for the symptoms. She has had no prior ED visits. She has had no prior ICU admissions. Associated medical issues do not include asthma, COPD, pneumonia, PE, CAD, DVT or recent surgery.         Past Medical History:   Diagnosis Date    History of delivery of macrosomal infant     Palpitations 11/10/2015       Past Surgical History:   Procedure Laterality Date    HX WISDOM TEETH EXTRACTION           Family History:   Problem Relation Age of Onset    No Known Problems Mother     No Known Problems Father     No Known Problems Sister     No Known Problems Brother     No Known Problems Maternal Aunt     No Known Problems Maternal Uncle     No Known Problems Paternal Aunt     No Known Problems Paternal Uncle     No Known Problems Maternal Grandmother     No Known Problems Maternal Grandfather     No Known Problems Paternal Grandmother     No Known Problems Paternal Grandfather        Social History     Socioeconomic History    Marital status:      Spouse name: Not on file    Number of children: Not on file    Years of education: Not on file    Highest education level: Not on file   Occupational History    Not on file   Social Needs    Financial resource strain: Not on file    Food insecurity:     Worry: Not on file     Inability: Not on file    Transportation needs:     Medical: Not on file     Non-medical: Not on file   Tobacco Use    Smoking status: Never Smoker    Smokeless tobacco: Never Used   Substance and Sexual Activity    Alcohol use: No    Drug use: No    Sexual activity: Yes     Partners: Male     Birth control/protection: None   Lifestyle    Physical activity:     Days per week: Not on file     Minutes per session: Not on file    Stress: Not on file   Relationships    Social connections:     Talks on phone: Not on file     Gets together: Not on file     Attends Holiness service: Not on file     Active member of club or organization: Not on file     Attends meetings of clubs or organizations: Not on file     Relationship status: Not on file    Intimate partner violence:     Fear of current or ex partner: Not on file     Emotionally abused: Not on file     Physically abused: Not on file     Forced sexual activity: Not on file   Other Topics Concern    Not on file   Social History Narrative    Not on file         ALLERGIES: Patient has no known allergies. Review of Systems   Constitutional: Negative for chills and fever. HENT: Negative for rhinorrhea. Respiratory: Positive for shortness of breath. Negative for cough, hemoptysis, sputum production and wheezing. Cardiovascular: Negative for leg swelling and syncope. Gastrointestinal: Negative for abdominal distention and abdominal pain. Genitourinary: Negative. Musculoskeletal: Negative. Neurological: Negative. Psychiatric/Behavioral: Negative for confusion and decreased concentration. All other systems reviewed and are negative.       Vitals: 09/18/19 1349 09/18/19 1419   BP: 118/59    Pulse: 77    Resp: 18    Temp: 98.6 °F (37 °C)    SpO2: 96% 96%   Weight: 68 kg (150 lb)    Height: 5' 5\" (1.651 m)             Physical Exam   Constitutional: She appears well-developed and well-nourished. Non-toxic appearance. She does not appear ill. HENT:   Head: Atraumatic. Eyes: Pupils are equal, round, and reactive to light. Cardiovascular: Normal rate, regular rhythm, normal heart sounds and intact distal pulses. Exam reveals no friction rub. No murmur heard. Pulmonary/Chest: She has no decreased breath sounds. She has no wheezes. She exhibits no tenderness, no bony tenderness and no swelling. Abdominal: Soft. Musculoskeletal:        Right lower leg: She exhibits no tenderness and no edema. Left lower leg: She exhibits no tenderness and no edema. Lymphadenopathy:     She has no cervical adenopathy. Neurological: She is alert. Skin: No ecchymosis noted. She is not diaphoretic. No erythema. Psychiatric: She has a normal mood and affect. Nursing note and vitals reviewed. MDM  Number of Diagnoses or Management Options  Atypical chest pain:   Dyspnea, unspecified type:   Diagnosis management comments: Here with some shortness of breath and plus or minus chest discomfort. Patient with no baseline pulmonary or cardiac disease. No infectious changes. Unaware pregnancy until testing done in our department. EGD is done which shows no acute changes. Troponin is negative. Chest x-ray with no infiltrate, pneumothorax or other radiographic abnormality. Extremities with no pathology. Symptomatology have improved other than mild shortness of breath along with some non-focal chest pain. With sudden severe SOB and diaphoresis with negative cardiac will do chest CT with PE protochol.  Discussed with radiologist and reviewed with patient and spouse       Amount and/or Complexity of Data Reviewed  Clinical lab tests: reviewed and ordered  Tests in the radiology section of CPT®: reviewed and ordered  Decide to obtain previous medical records or to obtain history from someone other than the patient: yes  Obtain history from someone other than the patient: yes (spouse)  Independent visualization of images, tracings, or specimens: yes    Risk of Complications, Morbidity, and/or Mortality  Presenting problems: high  Diagnostic procedures: low  Management options: moderate           Procedures

## 2019-11-11 PROBLEM — O09.529 ANTEPARTUM MULTIGRAVIDA OF ADVANCED MATERNAL AGE: Status: ACTIVE | Noted: 2019-11-11

## 2019-11-11 PROBLEM — O35.8XX0 CYSTIC HYGROMA OF FETUS IN SINGLETON PREGNANCY: Status: RESOLVED | Noted: 2019-05-14 | Resolved: 2019-11-11

## 2019-11-11 PROBLEM — O09.41 SUPERVISION OF HIGH-RISK PREGNANCY WITH GRAND MULTIPARITY IN FIRST TRIMESTER: Status: RESOLVED | Noted: 2019-05-13 | Resolved: 2019-11-11

## 2019-11-11 PROBLEM — O09.521 MULTIGRAVIDA OF ADVANCED MATERNAL AGE IN FIRST TRIMESTER: Status: RESOLVED | Noted: 2019-05-13 | Resolved: 2019-11-11

## 2020-01-27 PROBLEM — R42 DIZZINESS: Status: ACTIVE | Noted: 2020-01-27

## 2020-02-25 PROBLEM — D64.9 ANEMIA: Status: ACTIVE | Noted: 2020-02-25

## 2020-05-04 PROBLEM — O36.63X0 EXCESSIVE FETAL GROWTH AFFECTING MANAGEMENT OF PREGNANCY IN THIRD TRIMESTER: Status: ACTIVE | Noted: 2020-05-04

## 2020-05-14 ENCOUNTER — ANESTHESIA (OUTPATIENT)
Dept: LABOR AND DELIVERY | Age: 35
End: 2020-05-14
Payer: COMMERCIAL

## 2020-05-14 ENCOUNTER — ANESTHESIA EVENT (OUTPATIENT)
Dept: LABOR AND DELIVERY | Age: 35
End: 2020-05-14
Payer: COMMERCIAL

## 2020-05-14 ENCOUNTER — HOSPITAL ENCOUNTER (INPATIENT)
Age: 35
LOS: 1 days | Discharge: HOME | End: 2020-05-15
Attending: OBSTETRICS & GYNECOLOGY | Admitting: OBSTETRICS & GYNECOLOGY
Payer: COMMERCIAL

## 2020-05-14 PROBLEM — Z3A.39 39 WEEKS GESTATION OF PREGNANCY: Status: ACTIVE | Noted: 2020-05-14

## 2020-05-14 PROBLEM — Z37.9 NORMAL LABOR: Status: ACTIVE | Noted: 2020-05-14

## 2020-05-14 PROBLEM — Z34.90 ENCOUNTER FOR PLANNED INDUCTION OF LABOR: Status: ACTIVE | Noted: 2020-05-14

## 2020-05-14 PROBLEM — O09.529 AMA (ADVANCED MATERNAL AGE) MULTIGRAVIDA 35+: Status: ACTIVE | Noted: 2020-05-14

## 2020-05-14 LAB
ABO + RH BLD: NORMAL
ALBUMIN SERPL-MCNC: 2.6 G/DL (ref 3.5–5)
ALBUMIN/GLOB SERPL: 0.7 {RATIO} (ref 1.2–3.5)
ALP SERPL-CCNC: 169 U/L (ref 50–130)
ALT SERPL-CCNC: 9 U/L (ref 12–65)
ANION GAP SERPL CALC-SCNC: 8 MMOL/L (ref 7–16)
AST SERPL-CCNC: 17 U/L (ref 15–37)
BASOPHILS # BLD: 0 K/UL (ref 0–0.2)
BASOPHILS NFR BLD: 0 % (ref 0–2)
BILIRUB SERPL-MCNC: 0.5 MG/DL (ref 0.2–1.1)
BLOOD GROUP ANTIBODIES SERPL: NORMAL
BUN SERPL-MCNC: 6 MG/DL (ref 6–23)
CALCIUM SERPL-MCNC: 9 MG/DL (ref 8.3–10.4)
CHLORIDE SERPL-SCNC: 105 MMOL/L (ref 98–107)
CO2 SERPL-SCNC: 24 MMOL/L (ref 21–32)
CREAT SERPL-MCNC: 0.5 MG/DL (ref 0.6–1)
DIFFERENTIAL METHOD BLD: ABNORMAL
EOSINOPHIL # BLD: 0.1 K/UL (ref 0–0.8)
EOSINOPHIL NFR BLD: 1 % (ref 0.5–7.8)
ERYTHROCYTE [DISTWIDTH] IN BLOOD BY AUTOMATED COUNT: 13.3 % (ref 11.9–14.6)
GLOBULIN SER CALC-MCNC: 4 G/DL (ref 2.3–3.5)
GLUCOSE SERPL-MCNC: 71 MG/DL (ref 65–100)
HCT VFR BLD AUTO: 32.3 % (ref 35.8–46.3)
HGB BLD-MCNC: 10.3 G/DL (ref 11.7–15.4)
IMM GRANULOCYTES # BLD AUTO: 0 K/UL (ref 0–0.5)
IMM GRANULOCYTES NFR BLD AUTO: 0 % (ref 0–5)
LYMPHOCYTES # BLD: 1.4 K/UL (ref 0.5–4.6)
LYMPHOCYTES NFR BLD: 14 % (ref 13–44)
MCH RBC QN AUTO: 28.7 PG (ref 26.1–32.9)
MCHC RBC AUTO-ENTMCNC: 31.9 G/DL (ref 31.4–35)
MCV RBC AUTO: 90 FL (ref 79.6–97.8)
MONOCYTES # BLD: 0.6 K/UL (ref 0.1–1.3)
MONOCYTES NFR BLD: 6 % (ref 4–12)
NEUTS SEG # BLD: 7.9 K/UL (ref 1.7–8.2)
NEUTS SEG NFR BLD: 79 % (ref 43–78)
NRBC # BLD: 0 K/UL (ref 0–0.2)
PLATELET # BLD AUTO: 265 K/UL (ref 150–450)
PMV BLD AUTO: 10.7 FL (ref 9.4–12.3)
POTASSIUM SERPL-SCNC: 4.2 MMOL/L (ref 3.5–5.1)
PROT SERPL-MCNC: 6.6 G/DL (ref 6.3–8.2)
RBC # BLD AUTO: 3.59 M/UL (ref 4.05–5.2)
SODIUM SERPL-SCNC: 137 MMOL/L (ref 136–145)
SPECIMEN EXP DATE BLD: NORMAL
WBC # BLD AUTO: 9.9 K/UL (ref 4.3–11.1)

## 2020-05-14 PROCEDURE — 3E033VJ INTRODUCTION OF OTHER HORMONE INTO PERIPHERAL VEIN, PERCUTANEOUS APPROACH: ICD-10-PCS | Performed by: OBSTETRICS & GYNECOLOGY

## 2020-05-14 PROCEDURE — A4300 CATH IMPL VASC ACCESS PORTAL: HCPCS | Performed by: ANESTHESIOLOGY

## 2020-05-14 PROCEDURE — 75410000000 HC DELIVERY VAGINAL/SINGLE

## 2020-05-14 PROCEDURE — 65270000029 HC RM PRIVATE

## 2020-05-14 PROCEDURE — 75410000003 HC RECOV DEL/VAG/CSECN EA 0.5 HR

## 2020-05-14 PROCEDURE — 0HQ9XZZ REPAIR PERINEUM SKIN, EXTERNAL APPROACH: ICD-10-PCS | Performed by: OBSTETRICS & GYNECOLOGY

## 2020-05-14 PROCEDURE — 76060000078 HC EPIDURAL ANESTHESIA

## 2020-05-14 PROCEDURE — 85025 COMPLETE CBC W/AUTO DIFF WBC: CPT

## 2020-05-14 PROCEDURE — 74011250636 HC RX REV CODE- 250/636: Performed by: OBSTETRICS & GYNECOLOGY

## 2020-05-14 PROCEDURE — 74011250637 HC RX REV CODE- 250/637: Performed by: OBSTETRICS & GYNECOLOGY

## 2020-05-14 PROCEDURE — 4A1HXCZ MONITORING OF PRODUCTS OF CONCEPTION, CARDIAC RATE, EXTERNAL APPROACH: ICD-10-PCS | Performed by: OBSTETRICS & GYNECOLOGY

## 2020-05-14 PROCEDURE — 74011250636 HC RX REV CODE- 250/636: Performed by: REGISTERED NURSE

## 2020-05-14 PROCEDURE — 86900 BLOOD TYPING SEROLOGIC ABO: CPT

## 2020-05-14 PROCEDURE — 80053 COMPREHEN METABOLIC PANEL: CPT

## 2020-05-14 PROCEDURE — 75410000002 HC LABOR FEE PER 1 HR

## 2020-05-14 PROCEDURE — 77030018846 HC SOL IRR STRL H20 ICUM -A

## 2020-05-14 PROCEDURE — 77030014125 HC TY EPDRL BBMI -B: Performed by: ANESTHESIOLOGY

## 2020-05-14 PROCEDURE — 74011000250 HC RX REV CODE- 250: Performed by: REGISTERED NURSE

## 2020-05-14 PROCEDURE — 77030003028 HC SUT VCRL J&J -A

## 2020-05-14 RX ORDER — OXYCODONE AND ACETAMINOPHEN 7.5; 325 MG/1; MG/1
2 TABLET ORAL
Status: DISCONTINUED | OUTPATIENT
Start: 2020-05-14 | End: 2020-05-16 | Stop reason: HOSPADM

## 2020-05-14 RX ORDER — SODIUM CHLORIDE 0.9 % (FLUSH) 0.9 %
5-40 SYRINGE (ML) INJECTION AS NEEDED
Status: DISCONTINUED | OUTPATIENT
Start: 2020-05-14 | End: 2020-05-14

## 2020-05-14 RX ORDER — NALOXONE HYDROCHLORIDE 0.4 MG/ML
0.4 INJECTION, SOLUTION INTRAMUSCULAR; INTRAVENOUS; SUBCUTANEOUS AS NEEDED
Status: DISCONTINUED | OUTPATIENT
Start: 2020-05-14 | End: 2020-05-16 | Stop reason: HOSPADM

## 2020-05-14 RX ORDER — OXYTOCIN/RINGER'S LACTATE 30/500 ML
0-25 PLASTIC BAG, INJECTION (ML) INTRAVENOUS
Status: DISCONTINUED | OUTPATIENT
Start: 2020-05-14 | End: 2020-05-14

## 2020-05-14 RX ORDER — EPHEDRINE SULFATE/0.9% NACL/PF 50 MG/5 ML
SYRINGE (ML) INTRAVENOUS AS NEEDED
Status: DISCONTINUED | OUTPATIENT
Start: 2020-05-14 | End: 2020-05-14 | Stop reason: HOSPADM

## 2020-05-14 RX ORDER — IBUPROFEN 800 MG/1
800 TABLET ORAL EVERY 8 HOURS
Status: DISCONTINUED | OUTPATIENT
Start: 2020-05-14 | End: 2020-05-16 | Stop reason: HOSPADM

## 2020-05-14 RX ORDER — ROPIVACAINE HYDROCHLORIDE 2 MG/ML
INJECTION, SOLUTION EPIDURAL; INFILTRATION; PERINEURAL
Status: DISCONTINUED | OUTPATIENT
Start: 2020-05-14 | End: 2020-05-14 | Stop reason: HOSPADM

## 2020-05-14 RX ORDER — OXYTOCIN/RINGER'S LACTATE 30/500 ML
250 PLASTIC BAG, INJECTION (ML) INTRAVENOUS ONCE
Status: COMPLETED | OUTPATIENT
Start: 2020-05-14 | End: 2020-05-14

## 2020-05-14 RX ORDER — DIPHENHYDRAMINE HCL 25 MG
25 CAPSULE ORAL
Status: DISCONTINUED | OUTPATIENT
Start: 2020-05-14 | End: 2020-05-16 | Stop reason: HOSPADM

## 2020-05-14 RX ORDER — MINERAL OIL
120 OIL (ML) ORAL
Status: DISCONTINUED | OUTPATIENT
Start: 2020-05-14 | End: 2020-05-14 | Stop reason: HOSPADM

## 2020-05-14 RX ORDER — SODIUM CHLORIDE 0.9 % (FLUSH) 0.9 %
5-40 SYRINGE (ML) INJECTION EVERY 8 HOURS
Status: DISCONTINUED | OUTPATIENT
Start: 2020-05-14 | End: 2020-05-14

## 2020-05-14 RX ORDER — ROPIVACAINE HYDROCHLORIDE 2 MG/ML
INJECTION, SOLUTION EPIDURAL; INFILTRATION; PERINEURAL AS NEEDED
Status: DISCONTINUED | OUTPATIENT
Start: 2020-05-14 | End: 2020-05-14 | Stop reason: HOSPADM

## 2020-05-14 RX ORDER — LIDOCAINE HYDROCHLORIDE 10 MG/ML
1 INJECTION INFILTRATION; PERINEURAL
Status: DISCONTINUED | OUTPATIENT
Start: 2020-05-14 | End: 2020-05-14 | Stop reason: HOSPADM

## 2020-05-14 RX ORDER — OXYCODONE AND ACETAMINOPHEN 5; 325 MG/1; MG/1
1 TABLET ORAL
Status: DISCONTINUED | OUTPATIENT
Start: 2020-05-14 | End: 2020-05-16 | Stop reason: HOSPADM

## 2020-05-14 RX ORDER — ONDANSETRON 2 MG/ML
8 INJECTION INTRAMUSCULAR; INTRAVENOUS
Status: DISCONTINUED | OUTPATIENT
Start: 2020-05-14 | End: 2020-05-16 | Stop reason: HOSPADM

## 2020-05-14 RX ORDER — BISACODYL 5 MG
5 TABLET, DELAYED RELEASE (ENTERIC COATED) ORAL DAILY PRN
Status: DISCONTINUED | OUTPATIENT
Start: 2020-05-14 | End: 2020-05-16 | Stop reason: HOSPADM

## 2020-05-14 RX ORDER — OXYTOCIN/RINGER'S LACTATE 30/500 ML
0-25 PLASTIC BAG, INJECTION (ML) INTRAVENOUS
Status: DISCONTINUED | OUTPATIENT
Start: 2020-05-14 | End: 2020-05-14 | Stop reason: HOSPADM

## 2020-05-14 RX ORDER — LIDOCAINE HYDROCHLORIDE 20 MG/ML
JELLY TOPICAL
Status: DISCONTINUED | OUTPATIENT
Start: 2020-05-14 | End: 2020-05-14 | Stop reason: HOSPADM

## 2020-05-14 RX ORDER — DEXTROSE, SODIUM CHLORIDE, SODIUM LACTATE, POTASSIUM CHLORIDE, AND CALCIUM CHLORIDE 5; .6; .31; .03; .02 G/100ML; G/100ML; G/100ML; G/100ML; G/100ML
125 INJECTION, SOLUTION INTRAVENOUS CONTINUOUS
Status: DISCONTINUED | OUTPATIENT
Start: 2020-05-14 | End: 2020-05-14

## 2020-05-14 RX ORDER — BUTORPHANOL TARTRATE 2 MG/ML
1 INJECTION INTRAMUSCULAR; INTRAVENOUS
Status: DISCONTINUED | OUTPATIENT
Start: 2020-05-14 | End: 2020-05-14 | Stop reason: HOSPADM

## 2020-05-14 RX ADMIN — ROPIVACAINE HYDROCHLORIDE 10 ML/HR: 2 INJECTION, SOLUTION EPIDURAL; INFILTRATION at 15:22

## 2020-05-14 RX ADMIN — Medication 15 MG: at 15:26

## 2020-05-14 RX ADMIN — Medication 15000 MILLI-UNITS/HR: at 19:50

## 2020-05-14 RX ADMIN — SODIUM CHLORIDE, SODIUM LACTATE, POTASSIUM CHLORIDE, CALCIUM CHLORIDE, AND DEXTROSE MONOHYDRATE 125 ML/HR: 600; 310; 30; 20; 5 INJECTION, SOLUTION INTRAVENOUS at 14:37

## 2020-05-14 RX ADMIN — Medication 15 MG: at 15:30

## 2020-05-14 RX ADMIN — Medication 2 MILLI-UNITS/MIN: at 15:44

## 2020-05-14 RX ADMIN — SODIUM CHLORIDE, SODIUM LACTATE, POTASSIUM CHLORIDE, AND CALCIUM CHLORIDE 500 ML: 600; 310; 30; 20 INJECTION, SOLUTION INTRAVENOUS at 14:37

## 2020-05-14 RX ADMIN — IBUPROFEN 800 MG: 800 TABLET, FILM COATED ORAL at 22:10

## 2020-05-14 RX ADMIN — Medication 8 ML: at 15:22

## 2020-05-14 NOTE — ANESTHESIA PROCEDURE NOTES
Epidural Block    Start time: 5/14/2020 3:12 PM  End time: 5/14/2020 3:17 PM  Performed by: Tamika Arshad MD  Authorized by: Tamika Arshad MD     Pre-Procedure  Indication: at surgeon's request, post-op pain management, procedure for pain and labor epidural    Preanesthetic Checklist: patient identified, risks and benefits discussed, anesthesia consent, site marked, patient being monitored, timeout performed and anesthesia consent    Timeout Time: 15:10        Epidural:   Patient position:  Seated  Prep region:  Lumbar  Prep: Chlorhexidine    Location:  L3-4    Needle and Epidural Catheter:   Needle Type:  Tuohy  Needle Gauge:  17 G  Injection Technique:  Loss of resistance using saline  Attempts:  1  Catheter Size:  19 G  Catheter at Skin Depth (cm):  10  Depth in Epidural Space (cm):  5  Events: no blood with aspiration, no cerebrospinal fluid with aspiration, no paresthesia and negative aspiration test    Test Dose:  Lidocaine 1.5% w/ epi and negative    Assessment:   Catheter Secured:  Tegaderm and tape  Insertion:  Uncomplicated  Patient tolerance:  Patient tolerated the procedure well with no immediate complications

## 2020-05-14 NOTE — PROGRESS NOTES
Maribel Riojas at bedside at [phone]. LUIS Andrade at bedside at [place] [address] pt to sitting up on bedside at 1507. Timeout completed at 0 with MD, LUIS and myself at bedside. Test dose given at 1517. Negative reaction. Dose given at 1521. Pt assisted to lying back in left tilt position. See anesthesia record for details. See vital sign flow sheet for BP. Tolerated procedure well.

## 2020-05-14 NOTE — H&P
History & Physical    Name: Lalitha Byrne MRN: [id number]  SSN: [id number]    YOB: 1985  Age: 27 y.o. Sex: female      Subjective:     Estimated Date of Delivery: 20  OB History    Para Term  AB Living   7 5 5 0 1 5   SAB TAB Ectopic Molar Multiple Live Births   0 0 0 0 0 5      # Outcome Date GA Lbr Dom/2nd Weight Sex Delivery Anes PTL Lv   7 Current            6 AB 19     SAB      5 Term 17 41w5d  9 lb 6.4 oz (4.265 kg) F Vag-Spont EPIDURAL AN N JOYA   4 Term 03/07/15    M Vag-Spont None N JOYA   3 Term 12    F Vag-Spont None N JOYA   2 Term 06/08/10    M Vag-Spont None N JOYA   1 Term 08    F Vag-Spont EPI N JOYA       Ms. Jearl Dancer is a 28yo [address] [address] admitted with pregnancy at 39w0d for induction of labor due to grand multip with favorable cervix desiring elective IOL. Prenatal course complicated by hx Macrosomia and AMA. Last growth US (20) at 37w4d:  GP: 74% (7#11), AC 95%, RODNEY 20.6. Pelvis proven 10#. Pregnancy course also complicated by persistent Dizziness/ Tinnitus w/ ENT referral--pt for FU pp if sxs persistent. Pt also w/ prior hx of an SAB of a baby w/ a Cystic Hygroma. Please see prenatal records for details.     Past Medical History:   Diagnosis Date    Anemia 2020    History of delivery of macrosomal infant     Palpitations 11/10/2015     Past Surgical History:   Procedure Laterality Date    HX WISDOM TEETH EXTRACTION       Social History     Occupational History    Not on file   Tobacco Use    Smoking status: Never Smoker    Smokeless tobacco: Never Used   Substance and Sexual Activity    Alcohol use: No    Drug use: No    Sexual activity: Yes     Partners: Male     Birth control/protection: None     Family History   Problem Relation Age of Onset    No Known Problems Mother     No Known Problems Father     No Known Problems Sister     No Known Problems Brother     No Known Problems Maternal Aunt     No Known Problems Maternal Uncle     No Known Problems Paternal Aunt     No Known Problems Paternal Uncle     No Known Problems Maternal Grandmother     No Known Problems Maternal Grandfather     No Known Problems Paternal Grandmother     No Known Problems Paternal Grandfather     Diabetes Neg Hx     Hypertension Neg Hx        No Known Allergies  Prior to Admission medications    Medication Sig Start Date End Date Taking? Authorizing Provider   AMBULATORY BREAST PUMP Apply to breast as needed 5/4/20   Yann Villaseñor MD   ferrous sulfate (IRON) 325 mg (65 mg iron) EC tablet Take 1 Tab by mouth daily. 2/25/20   Yann Villaseñor MD   ascorbic acid, vitamin C, (VITAMIN C) 500 mg tablet Take 1 Tab by mouth daily. 2/25/20   Ramya Mogran MD   PNV No12-Iron-FA-DSS-OM-3 29 mg iron-1 mg -50 mg CPKD Take  by mouth. Provider, Historical        Review of Systems: A comprehensive review of systems was negative except for that written in the History of Present Illness. Objective:     Vitals:  Vitals:    05/14/20 1425 05/14/20 1429   Temp:  98.4 °F (36.9 °C)   Weight: 192 lb (87.1 kg)    Height: 5' 5\" (1.651 m)         Physical Exam:  Constitutional: She appears well-developed and well-nourished. No distress. HENT:    Head: Normocephalic and atraumatic. Lungs: CTAB, effort normal, no rales/crackles  Cardiovascular: RRR, no M/R/G  Abd:  Gravid, no RUQ TTP  Skin: She is not diaphoretic. Psychiatric: She has a normal mood and affect.  Her behavior is normal. Thought content normal.     SVE: 2/50/-3      Prenatal Labs:   Lab Results   Component Value Date/Time    ABO/Rh(D) A POSITIVE 11/13/2017 10:30 AM    Rubella, External 11.90 04/17/2017    HBsAg, External Negative 04/17/2017    HIV, External N.R. 04/17/2017    RPR, External Non-Reactive 04/17/2017    ABO,Rh A+ Positive 04/17/2017    GrBStrep, External Negative 09/26/2017       Impression/Plan:     Active Problems:    Normal labor (5/14/2020)      AMA (advanced maternal age) multigravida 33+ (5/14/2020)      39 weeks gestation of pregnancy (5/14/2020)      Encounter for planned induction of labor (5/14/2020)       Plan: Admit for induction of labor. Group B Strep negative. Start Pit. Pt desires AGAPITO prior to AROM.         Deidra Ball MD

## 2020-05-14 NOTE — PROGRESS NOTES
Labor Progress Note Continue Labor  Patient seen, fetal heart rate and contraction pattern evaluated, patient examined. Comfortable s/p AGAPITO     Patient Vitals for the past 8 hrs:   BP Temp Pulse Height Weight   20 1559 118/55  (!) 106     20 1555 119/57  (!) 117     20 1549 123/64  96     20 1544 110/57  (!) 114     20 1539 123/61  (!) 110     20 1533 117/58  79     20 1531 117/67  62     20 1530 111/68  72     20 1528 (!) 66/36  (!) 109     20 1526 97/57  (!) 126     20 1524 93/52  (!) 118     20 1523 99/54  (!) 107     20 1522 98/59  86     20 1520 117/62  99     20 1519 116/61  93     20 1518 115/69  92     20 1429  98.4 °F (36.9 °C)      20 1425    5' 5\" (1.651 m) 192 lb (87.1 kg)         FHTs:  150/mod/+accels/-decels  [place]: Q3-4min, irreg    SVE:  3/80/-2; AROM clear      Assessment/Plan:  27 y. o. at 39w0d for elective IOL:    1) IOL:  AROM; increase Pit PRN    2) FHTs:  Cat I, R   3) GBS:  Mike Rahman MD

## 2020-05-14 NOTE — ANESTHESIA PREPROCEDURE EVALUATION
Anesthetic History               Review of Systems / Medical History  Patient summary reviewed, nursing notes reviewed and pertinent labs reviewed    Pulmonary                   Neuro/Psych              Cardiovascular                  Exercise tolerance: >4 METS     GI/Hepatic/Renal                Endo/Other             Other Findings              Physical Exam    Airway  Mallampati: I  TM Distance: 4 - 6 cm  Neck ROM: normal range of motion   Mouth opening: Normal     Cardiovascular               Dental         Pulmonary                 Abdominal  GI exam deferred       Other Findings            Anesthetic Plan    ASA: 2  Anesthesia type: epidural            Anesthetic plan and risks discussed with: Patient and Spouse

## 2020-05-14 NOTE — PROGRESS NOTES
Dr Wei Hernandez notified of [place] 6/80/-2. Late decels earlier but once repositioned to right side reactive.

## 2020-05-14 NOTE — PROGRESS NOTES
Pt off peanut ball in right hip tilt. SVE 8/90/0 with contraction. Head comes done well with contraction.

## 2020-05-15 VITALS
RESPIRATION RATE: 18 BRPM | WEIGHT: 192 LBS | TEMPERATURE: 98.2 F | HEART RATE: 82 BPM | OXYGEN SATURATION: 99 % | DIASTOLIC BLOOD PRESSURE: 50 MMHG | HEIGHT: 65 IN | SYSTOLIC BLOOD PRESSURE: 109 MMHG | BODY MASS INDEX: 31.99 KG/M2

## 2020-05-15 PROCEDURE — 74011250637 HC RX REV CODE- 250/637: Performed by: OBSTETRICS & GYNECOLOGY

## 2020-05-15 RX ADMIN — IBUPROFEN 800 MG: 800 TABLET, FILM COATED ORAL at 12:55

## 2020-05-15 RX ADMIN — IBUPROFEN 800 MG: 800 TABLET, FILM COATED ORAL at 05:15

## 2020-05-15 NOTE — PROGRESS NOTES
Bedside report received from [facility]. Patient care assumed.

## 2020-05-15 NOTE — LACTATION NOTE

## 2020-05-15 NOTE — PROGRESS NOTES
SBAR OUT Report: Mother    Verbal report given to COREY Donaldson RN (full name & credentials) on this patient, who is now being transferred to MIU (unit) for routine progression of care. The patient is not wearing a green \"Anesthesia-Duramorph\" band. Report consisted of patient's Situation, Background, Assessment and Recommendations (SBAR). [place] ID bands were compared with the identification form, and verified with the patient and receiving nurse. Information from the SBAR, Procedure Summary, Intake/Output and MAR and the [place] Report was reviewed with the receiving nurse; opportunity for questions and clarification provided.

## 2020-05-15 NOTE — ANESTHESIA POSTPROCEDURE EVALUATION
* No procedures listed *.    epidural    Anesthesia Post Evaluation      Multimodal analgesia: multimodal analgesia used between 6 hours prior to anesthesia start to PACU discharge  Patient location during evaluation: bedside  Patient participation: complete - patient participated  Level of consciousness: responsive to verbal stimuli  Pain management: adequate  Airway patency: patent  Anesthetic complications: no  Cardiovascular status: hemodynamically stable  Respiratory status: spontaneous ventilation  Hydration status: stable  Comments: The patient was satisfied with her labor epidural and denies any complications. Her lower extremities have returned to baseline neurologically.           Vitals Value Taken Time   /50 5/15/2020  7:07 AM   Temp 36.8 °C (98.2 °F) 5/15/2020  7:07 AM   Pulse 82 5/15/2020  7:07 AM   Resp 18 5/15/2020  7:07 AM   SpO2 99 % 5/15/2020  7:07 AM

## 2020-05-15 NOTE — PROGRESS NOTES
Chart reviewed - no needs identified. SW met with patient who denies any history of postpartum depression/anxiety. Patient given informational packet on  mood & anxiety disorders (resources/education). Family denies any additional needs from  at this time. Family has 's contact information should any needs/questions arise.     DEE Farias-JAZLYN  [facility]   [phone]

## 2020-05-15 NOTE — LACTATION NOTE

## 2020-05-15 NOTE — DISCHARGE SUMMARY
Obstetrical Discharge Summary     Name: Jenise Forte MRN: [id number]  SSN: [id number]    YOB: 1985  Age: 27 y.o. Sex: female      Allergies: Patient has no known allergies. Admit Date: 2020    Discharge Date: 5/15/2020     Admitting Physician: Bakari Osborne MD     Attending Physician:  Josie Arteaga MD     * Admission Diagnoses: Normal labor [O80, Z37.9];39 weeks gestation of pregnancy [Z3A.39];AMA (advanced maternal age) multigravida 34+ [O18.1]; Encounter for planned induction of labor [Z34.90]    * Discharge Diagnoses:   Information for the patient's :  Alka Bryant [[id number]]   Delivery of a 8 lb 8.7 oz (3.875 kg) female infant via Vaginal, Spontaneous on 2020 at 7:42 PM  by Bakari Osborne. Apgars were 9  and 9 . Additional Diagnoses:   Hospital Problems as of 5/15/2020 Date Reviewed: 2020          Codes Class Noted - Resolved POA    Normal labor ICD-10-CM: O80, Z37.9  ICD-9-CM: 237  2020 - Present Unknown        AMA (advanced maternal age) multigravida 33+ ICD-10-CM: O09.529  ICD-9-CM: 659.60  2020 - Present Unknown        39 weeks gestation of pregnancy ICD-10-CM: Z3A.39  ICD-9-CM: V22.2  2020 - Present Unknown        Encounter for planned induction of labor ICD-10-CM: Z34.90  ICD-9-CM: V22.1  2020 - Present Unknown             Lab Results   Component Value Date/Time    ABO/Rh(D) A POSITIVE 2020 02:28 PM    Rubella, External 11.90 2017    GrBStrep, External Negative 2017    ABO,Rh A+ Positive 2017    There is no immunization history for the selected administration types on file for this patient. * Procedures:   * No surgery found *           * Discharge Condition: [facility] Course: Normal hospital course following the delivery.     * Disposition: Home    Discharge Medications:   Current Discharge Medication List      CONTINUE these medications which have NOT CHANGED    Details AMBULATORY BREAST PUMP Apply to breast as needed  Qty: 1 Device, Refills: 0      ascorbic acid, vitamin C, (VITAMIN C) 500 mg tablet Take 1 Tab by mouth daily. Qty: 30 Tab, Refills: 11      PNV No12-Iron-FA-DSS-OM-3 29 mg iron-1 mg -50 mg CPKD Take  by mouth. STOP taking these medications       ferrous sulfate (IRON) 325 mg (65 mg iron) EC tablet Comments:   Reason for Stopping:               * Follow-up Care/Patient Instructions:   Activity: No lifting, Driving, or Strenuous exercise for 2-4 weeks and No sex for 6 weeks  Diet: Regular Diet  Wound Care: As directed    Follow-up Information     Follow up With Specialties Details Why Contact Info    Maribel Malagon MD [address]  [facility] [address] [phone]

## 2020-05-15 NOTE — PROGRESS NOTES
Delivery Note    Dr Matt Sneed arrived to bedside at 1935. Pt positioned for delivery and set up at [address]. Spontaneous vaginal delivery of viable female infant @ 200. Apgar's 9/9. See delivery summary for details.

## 2020-05-15 NOTE — PROGRESS NOTES
SBAR IN Report: Mother    Verbal report received from Austin Kimbrough RN (full name & credentials) on this patient, who is now being transferred from L&D (unit) for routine progression of care. The patient is not wearing a green \"Anesthesia-Duramorph\" band. Report consisted of patient's Situation, Background, Assessment and Recommendations (SBAR). [place] ID bands were compared with the identification form, and verified with the patient and transferring nurse. Information from the SBAR and Intake/Output and the [place] Report was reviewed with the transferring nurse; opportunity for questions and clarification provided.

## 2020-05-15 NOTE — LACTATION NOTE
This note was copied from a baby's chart. Assisted with breastfeeding in cross cradle on R.  Baby fed fairly well. Demonstrated manual lip flange. Encouraged frequent feeding and watch output. Mom getting sore, encouraged changing position, lip flange, lanolin or olive/coconut oil. Suggested get baby started sucking on finger to help get in a good rhythm. Mom can hand express before feeding to get milk started and pull out nipple. Colostrum is thick and there is not much volume, so baby can put a lot of pressure on the nipple before swallowing. Once milk volume is in and flowing well, pressure should decrease. Encouraged mom to report any nipple damage or bleeding beyond soreness.

## 2020-05-15 NOTE — DISCHARGE INSTRUCTIONS
Patient Education        Postpartum: Care Instructions  Your Care Instructions  After childbirth (postpartum period), your body goes through many changes. Some of these changes happen over several weeks. In the hours after delivery, your body will begin to recover from childbirth while it prepares to breastfeed your . You may feel emotional during this time. Your hormones can shift your mood without warning for no clear reason. In the first couple of weeks after childbirth, many women have emotions that change from happy to sad. You may find it hard to sleep. You may cry a lot. This is called the \"baby blues. \" These overwhelming emotions often go away within a couple of days or weeks. But it's important to discuss your feelings with your doctor. It is easy to get too tired and overwhelmed during the first weeks after childbirth. Don't try to do too much. Get rest whenever you can, accept help from others, and eat well and drink plenty of fluids. In the first couple of weeks after giving birth, your doctor or midwife may want to check in with you and make a plan for any follow-up care you may need. You will likely have a complete postpartum visit in the first 3 months after delivery. At that time, your doctor or midwife will check on your recovery from childbirth. He or she will also see how you are doing with your emotions and talk about your concerns or questions. Follow-up care is a key part of your treatment and safety. Be sure to make and go to all appointments, and call your doctor if you are having problems. It's also a good idea to know your test results and keep a list of the medicines you take. How can you care for yourself at home? · Sleep or rest when your baby sleeps. · Get help with household chores from family or friends, if you can. Do not try to do it all yourself. · If you have hemorrhoids or swelling or pain around the opening of your vagina, try using cold and heat.  You can put ice or a cold pack on the area for 10 to 20 minutes at a time. Put a thin cloth between the ice and your skin. Also try sitting in a few inches of warm water (sitz bath) 3 times a day and after bowel movements. · Take pain medicines exactly as directed. ? If the doctor gave you a prescription medicine for pain, take it as prescribed. ? If you are not taking a prescription pain medicine, ask your doctor if you can take an over-the-counter medicine. · Eat more fiber to avoid constipation. Include foods such as whole-grain breads and cereals, raw vegetables, raw and dried fruits, and beans. · Drink plenty of fluids, enough so that your urine is light yellow or clear like water. If you have kidney, heart, or liver disease and have to limit fluids, talk with your doctor before you increase the amount of fluids you drink. · Do not rinse inside your vagina with fluids (douche). · If you have stitches, keep the area clean by pouring or spraying warm water over the area outside your vagina and anus after you use the toilet. · Keep a list of questions to ask your doctor or midwife. Your questions might be about:  ? Changes in your breasts, such as lumps or soreness. ? When to expect your menstrual period to start again. ? What form of birth control is best for you. ? Weight you have put on during the pregnancy. ? Exercise options. ? What foods and drinks are best for you, especially if you are breastfeeding. ? Problems you might be having with breastfeeding. ? When you can have sex. Some women may want to talk about lubricants for the vagina. ? Any feelings of sadness or restlessness that you are having. When should you call for help? Call 911 anytime you think you may need emergency care.  For example, call if:    · You have thoughts of harming yourself, your baby, or another person.     · You passed out (lost consciousness).     · You have chest pain, are short of breath, or cough up blood.     · You have a seizure.    Call your doctor now or seek immediate medical care if:    · Your vaginal bleeding seems to be getting heavier.     · You are dizzy or lightheaded, or you feel like you may faint.     · You have a fever.     · You have new or more belly pain.     · You have symptoms of a blood clot in your leg (called a deep vein thrombosis), such as:  ? Pain in the calf, back of the knee, thigh, or groin. ? Redness and swelling in your leg or groin.     · You have signs of preeclampsia, such as:  ? Sudden swelling of your face, hands, or feet. ? New vision problems (such as dimness, blurring, or seeing spots). ? A severe headache.    Watch closely for changes in your health, and be sure to contact your doctor if:    · You have new or worse vaginal discharge.     · You feel sad or depressed.     · You are having problems with your breasts or breastfeeding. Where can you learn more? Go to http://matt-loren.info/  Enter T854 in the search box to learn more about \"Postpartum: Care Instructions. \"  Current as of: May 29, 2019Content Version: 12.4  © 5542-1254 [employer]. Care instructions adapted under license by [employer] (which disclaims liability or warranty for this information). If you have questions about a medical condition or this instruction, always ask your healthcare professional. [address] any warranty or liability for your use of this information. DISCHARGE SUMMARY from Nurse    PATIENT INSTRUCTIONS:    After general anesthesia or intravenous sedation, for 24 hours or while taking prescription Narcotics:  · Limit your activities  · Do not drive and operate hazardous machinery  · Do not make important personal or business decisions  · Do  not drink alcoholic beverages  · If you have not urinated within 8 hours after discharge, please contact your surgeon on call.     Report the following to your surgeon:  · Excessive pain, swelling, redness or odor of or around the surgical area  · Temperature over 100.5  · Nausea and vomiting lasting longer than 4 hours or if unable to take medications  · Any signs of decreased circulation or nerve impairment to extremity: change in color, persistent  numbness, tingling, coldness or increase pain  · Any questions    What to do at Home:    Discharge instruction to follow: Activity: Pelvis rest for 6 weeks, nothing in vagina for 6 weeks     No heavy lifting over 15 lbs for 2 weeks     No driving for 2 weeks, no driving if taking narcotics     No push/pull motion such as sweeping or vacuuming for 2 weeks     No tub baths or swimming for 6 weeks    If using sitz bath continue until comfortable stopping. If using jammie-bottle continue to use until comfortable stopping. Change sanitary pad after each urination or bowel movement. Call MD for the following:      Fever over 101 F; pain not relieved by medication; foul smelling vaginal discharge or an increase in vaginal bleeding. Take medication as prescribed. Follow up with MD as order. *  Please give a list of your current medications to your Primary Care Provider. *  Please update this list whenever your medications are discontinued, doses are      changed, or new medications (including over-the-counter products) are added. *  Please carry medication information at all times in case of emergency situations. These are general instructions for a healthy lifestyle:    No smoking/ No tobacco products/ Avoid exposure to second hand smoke  Surgeon General's Warning:  Quitting smoking now greatly reduces serious risk to your health.     Obesity, smoking, and sedentary lifestyle greatly increases your risk for illness    A healthy diet, regular physical exercise & weight monitoring are important for maintaining a healthy lifestyle    You may be retaining fluid if you have a history of heart failure or if you experience any of the following symptoms:  Weight gain of 3 pounds or more overnight or 5 pounds in a week, increased swelling in our hands or feet or shortness of breath while lying flat in bed. Please call your doctor as soon as you notice any of these symptoms; do not wait until your next office visit. The discharge information has been reviewed with the patient. The patient verbalized understanding. Discharge medications reviewed with the patient and appropriate educational materials and side effects teaching were provided.   ___________________________________________________________________________________________________________________________________

## 2020-05-15 NOTE — PROGRESS NOTES
Post-Partum Day Number 1 Progress Note    Patient doing well post-partum day #1 without significant complaint. Voiding without difficulty, normal lochia. Patient Vitals for the past 24 hrs:   Temp Pulse Resp BP SpO2   05/15/20 0707 98.2 °F (36.8 °C) 82 18 109/50 99 %   05/14/20 2345 98.2 °F (36.8 °C) 94 18 111/43    05/14/20 2251 98.2 °F (36.8 °C) 88 18 106/61 99 %   05/14/20 2144  97  112/55    05/14/20 2114  88  114/57    05/14/20 2059  92  113/57    05/14/20 2045  98  126/58    05/14/20 1959  96  110/53    05/14/20 1944  96  105/57    05/14/20 1929  99  112/63    05/14/20 1901  (!) 105  112/59    05/14/20 1831  (!) 108  124/76    05/14/20 1815  100  102/54    05/14/20 1800  97  100/51    05/14/20 1758 98.4 °F (36.9 °C)       05/14/20 1744  89  113/63    05/14/20 1730  87  116/61    05/14/20 1715  86  113/62    05/14/20 1700  96  107/69    05/14/20 1644  (!) 101  101/60    05/14/20 1630  100  108/63    05/14/20 1616  81  116/71    05/14/20 1559  (!) 106  118/55    05/14/20 1555  (!) 117  119/57    05/14/20 1549  96  123/64    05/14/20 1544  (!) 114  110/57    05/14/20 1539  (!) 110  123/61    05/14/20 1533  79  117/58    05/14/20 1531  62  117/67    05/14/20 1530  72  111/68    05/14/20 1528  (!) 109  (!) 66/36    05/14/20 1526  (!) 126  97/57    05/14/20 1524  (!) 118  93/52    05/14/20 1523  (!) 107  99/54    05/14/20 1522  86  98/59    05/14/20 1520  99  117/62    05/14/20 1519  93  116/61    05/14/20 1518  92 18 115/69    05/14/20 1429 98.4 °F (36.9 °C)           Exam:  Patient without distress. Abdomen soft, fundus firm at level of umbilicus, nontender               Perineum with normal lochia noted. Lower extremities are negative for swelling, cords or tenderness.         Recent Labs     05/14/20  1428 02/24/20  1050 11/11/19  1159 09/18/19  1421   WBC 9.9 9.1 8.5 7.2   HGB 10.3* 10.5* 12.4 13.0   HCT 32.3* 30.6* 36.0 39.0    248 308 311       Recent Labs     05/14/20  1428 09/18/19  1421    137   K 4.2 3.7    108*   CO2 24 25   AGAP 8 4*   GLU 71 102*   BUN 6 9   CREA 0.50* 0.74   GFRAA >60 >60   GFRNA >60 >60   CA 9.0 9.8   ALB 2.6* 4.1   TP 6.6 7.9   GLOB 4.0* 3.8*   AGRAT 0.7* 1.1*   SGOT 17 16   ALT 9* 19          Recent Labs     05/14/20  1428 09/18/19  1421   GLU 71 102*       Prenatal Labs:    Lab Results   Component Value Date/Time    Rubella, External 11.90 04/17/2017    GrBStrep, External Negative 09/26/2017    HBsAg, External Negative 04/17/2017    HIV, External N.R. 04/17/2017    RPR, External Non-Reactive 04/17/2017       Problem List  Date Reviewed: 5/14/2020          Codes Class Noted    Normal labor ICD-10-CM: O80, Z37.9  ICD-9-CM: 194  5/14/2020        AMA (advanced maternal age) multigravida 33+ ICD-10-CM: O09.529  ICD-9-CM: 659.60  5/14/2020        39 weeks gestation of pregnancy ICD-10-CM: Z3A.39  ICD-9-CM: V22.2  5/14/2020        Encounter for planned induction of labor ICD-10-CM: Z34.90  ICD-9-CM: V22.1  5/14/2020        Excessive fetal growth affecting management of pregnancy in third trimester ICD-10-CM: O36.63X0  ICD-9-CM: 656.63  5/4/2020        Anemia ICD-10-CM: D64.9  ICD-9-CM: 285.9  2/25/2020        Dizziness ICD-10-CM: R42  ICD-9-CM: 780.4  1/27/2020    Overview Addendum 2/24/2020 10:28 AM by Tere Mensah MD     Called c/o continued dizziness  --Feels like the room is spinning   Most often in the mornng ; sometimes unable to get out of bed  No assoc cardiac sxs (cp/palpitations)  Eating well; nausea improved w/ Zofran--only taking every day; also taking B6  Discussed hydration and progesterone effect  S/p referral to ENT --sees in March       No ringing in ears   Did not happen w/ other preganncies  Better w/ staying in bed     rx glucometer and testing strips given today  Instructed to test during her dizzy spells but will also need to check for at least 2wks around 28wks given pt declines taking glucola    Discussed baseline EKG and holter--pt wants to wait for now  ? POTS    No other assoc sxs        2020:  Some improvement w/ dizziness (still every day though), but now w/ ringing in her ears, bilaterally               Antepartum multigravida of advanced maternal age ICD-10-CM: O09.529  ICD-9-CM: 659.63  2019    Overview Signed 2019  9:48 PM by Alycia Yap MD     Genetics declined  ASA 81mg 12-36wks     [id number]   x 5   G5:  17  9#6 at 41w5d (Dr Lucila Eisenberg)  [facility]   2019 MAB at [address] w/ Cystic hygroma  (NIPT low risk); no D&C required              History of macrosomia in infant in prior pregnancy, currently pregnant ICD-10-CM: O09.299  ICD-9-CM: V23.49  2019    Overview Addendum 2020  9:30 AM by Alycia Yap MD     Proven 10#  Denies hx GDM; normal BMI     Reports has Never  Performed glucola in past w/ previous pregnancies ---states they just had her come in fasting then fidelina her blood an hour after eating a random meal  States a lot of sugar makes her sick and would prefer to not perform the glucola at 28wks  Pelvis proven 10#    Declined glucola--see prior notes  Was told could DC checking fsbg    Hx Macrosomia   Watch growth     Growth US 4/3/2020: GP 59% (5#2), AC 75%, RODNEY 18   Repeat growth 37w4d :  GP: 74% (7#11), AC 95%, RODNEY 20.6   Vertex  bpp 8/8                               Current Facility-Administered Medications   Medication Dose Route Frequency    ibuprofen (MOTRIN) tablet 800 mg  800 mg Oral Q8H    oxyCODONE-acetaminophen (PERCOCET) 5-325 mg per tablet 1 Tab  1 Tab Oral Q4H PRN    oxyCODONE-acetaminophen (PERCOCET 7.5) 7.5-325 mg per tablet 2 Tab  2 Tab Oral Q4H PRN    naloxone (NARCAN) injection 0.4 mg  0.4 mg IntraVENous PRN    ondansetron (ZOFRAN) injection 8 mg  8 mg IntraVENous Q8H PRN    bisacodyL (DULCOLAX) tablet 5 mg  5 mg Oral DAILY PRN    diphenhydrAMINE (BENADRYL) capsule 25 mg  25 mg Oral Q4H PRN    rho D immune globulin (RHOGAM) 1,500 unit (300 mcg) injection 0.3 mg  300 mcg IntraMUSCular ONCE PRN    witch hazel-glycerin (TUCKS) 12.5-50 % pads 1 Pad  1 Pad PeriANAL PRN    benzocaine-menthoL (DERMOPLAST) 20-0.5 % topical aerosol 1 Spray  1 Spray Topical PRN       OB History    Para Term  AB Living   7 6 6 0 1 6   SAB TAB Ectopic Molar Multiple Live Births   0 0 0 0 0 6      # Outcome Date GA Lbr Dom/2nd Weight Sex Delivery Anes PTL Lv   7 Term 20 39w0d 03:46 / 00:12 8 lb 8.7 oz (3.875 kg) F Vag-Spont EPI N JOYA      Name: Colletta Orange: 9  Apgar5: 9   6 AB 19     SAB      5 Term 17 41w5d  9 lb 6.4 oz (4.265 kg) F Vag-Spont EPIDURAL AN N JOYA      Name: Colletta Orange: 9  Apgar5: 9   4 Term 03/07/15    M Vag-Spont None N JOYA   3 Term 12    F Vag-Spont None N JOYA   2 Term 06/08/10    M Vag-Spont None N JOYA   1 Term 08    F Vag-Spont EPI N JOYA       Assessment and Plan:      PPD 1:  Patient appears to be having uncomplicated post-partum course. Continue routine perineal care and maternal education. Pt wants discharge home today.     Pt is an experienced breastfeeder. SIDs  precautions reviewed    Declines rx meds, plans to take otc Motrin. D/w pt:  Infx/driving/physical activity/pelvic rest precautions    Pelvic rest x 6 wk ( no sex, swimming, tampons or douching)  Pt may drive after 2-4 weeks as long as she is NOT taking narcotics & can quickly maneuver her foot from the gas to the brake. For the next 2-4 weeks:  eat, shower and nurse the baby. Advance activity as tolerated. Notify [facility] for temp > 100.5, vaginal discharge with odor, heavy VB, worsening pelvic/abdominal pain and/or other concerns.        ABO Group   Date Value Ref Range Status   2019 A  Final     Rh (D)   Date Value Ref Range Status   2019 Positive  Final     Comment:     Please note: Prior records for this patient's ABO / Rh type are not  available for additional verification.        Rubella Ab, IgG   Date Value Ref Range Status   11/11/2019 10.80 Immune >0.99 index Final     Comment:                                     Non-immune       <0.90                                  Equivocal  0.90 - 0.99                                  Immune           >0.99

## 2020-05-15 NOTE — L&D DELIVERY NOTE
Delivery Summary    Patient: Kathryn Bailey MRN: [id number]  SSN: [id number]    YOB: 1985  Age: 27 y.o. Sex: female       Information for the patient's :  Simran Andres [[id number]]       Labor Events:    Labor: No    Steroids: None   Cervical Ripening Date/Time:       Cervical Ripening Type: None   Antibiotics During Labor: No   Rupture Identifier:      Rupture Date/Time: 2020 3:36 PM   Rupture Type: AROM   Amniotic Fluid Volume:      Amniotic Fluid Description: Clear    Amniotic Fluid Odor: None    Induction: Oxytocin;AROM       Induction Date/Time: 2020 3:44 PM    Indications for Induction: Elective    Augmentation: None   Augmentation Date/Time:      Indications for Augmentation:     Labor complications: None       Additional complications:        Delivery Events:  Indications For Episiotomy:     Episiotomy: None   Perineal Laceration(s): 1st   Repaired: Yes   Periurethral Laceration Location:      Repaired:     Labial Laceration Location:     Repaired:     Sulcal Laceration Location:     Repaired:     Vaginal Laceration Location:     Repaired:     Cervical Laceration Location:     Repaired:     Repair Suture: Vicryl 3-0   Number of Repair Packets: 1   Estimated Blood Loss (ml):  ml   Quantitative Blood Loss (ml)                Delivery Date: 2020    Delivery Time: 7:42 PM  Delivery Type: Vaginal, Spontaneous  Sex:  Female    Gestational Age: 39w0d   Delivery Clinician:  Olimpia Hawley  Living Status: Living   Delivery Location: L&D 424          APGARS  One minute Five minutes Ten minutes   Skin color: 1   1        Heart rate: 2   2        Grimace: 2   2        Muscle tone: 2   2        Breathin   2        Totals: 9   9            Presentation: Vertex    Position: Left Occiput Transverse  Resuscitation Method:  Suctioning-bulb; Tactile Stimulation     Meconium Stained: None      Cord Information: 3 Vessels  Complications: None  Cord around: Delayed cord clamping? Yes  Cord clamped date/time:2020  7:43 PM  Disposition of Cord Blood: Lab    Blood Gases Sent?: No    Placenta:  Date/Time: 2020  7:47 PM  Removal: Spontaneous      Appearance: Normal      Measurements:  Birth Weight: 8 lb 8.7 oz (3.875 kg)      Birth Length: 51 cm      Head Circumference: 36.5 cm      Chest Circumference: 32.5 cm     Abdominal Girth: Other Providers:   Griffin SANDERS JESSICA S.;YEHUDA LUNA, Obstetrician;Primary Nurse;Primary [place] Nurse;[employer] land [employer] Tech           Group B Strep:   Lab Results   Component Value Date/Time    GrBStrep, External Negative 2017     Information for the patient's :  Dutch Fung [[id number]]   No results found for: ABORH, PCTABR, PCTDIG, BILI, ABORHEXT, ABORH    No results for input(s): PCO2CB, PO2CB, HCO3I, SO2I, IBD, PTEMPI, SPECTI, PHICB, ISITE, IDEV, IALLEN in the last 72 hours.  of a [facility] at [facility] on 2020* Apgars 9, 9  C/C/+2-->pushed to deliver head LOT onto intact perineum. Body followed easily thereafter. Delayed cord clamping, baby to mom. Cord clamped/cut. Cord blood drawn. Placenta delivered S/I/3VC. Small 1st degree lac noted--repaired in typical fasion w/ 3.0 Vicryl. FF w/ pit and massage. QBL per RNs (average). Mom/baby stable.        Beto Lerner MD

## 2020-05-15 NOTE — PROGRESS NOTES
Admission assessment complete as noted. Patient oriented to room and unit. Plan of care reviewed and patient verbalizes understanding. Questions encouraged and answered. Patent encouraged to call for needs or concerns. Safety Teaching reviewed:   1. Hand hygiene prior to handling the infant. 2. Use of bulb syringe. 3. Bracelets with matching numbers are placed on mother and infant  4. An infant security tag  [facility]) is placed on the infant's ankle and monitored  5. All OB nurses wear pink Employee badges - do not give your baby to anyone without proper identification. 6. Never leave the baby alone in the room. 7. The infant should be placed on their back to sleep. on a firm mattress. No toys should be placed in the crib. (safe sleep video offered to view)  8. Never shake the baby (video offered to view)  9. Infant fall prevention - do not sleep with the baby, and place the baby in the crib while ambulating. 8. Mother and Baby Care booklet given to Mother.

## 2020-06-25 PROBLEM — Z3A.39 39 WEEKS GESTATION OF PREGNANCY: Status: RESOLVED | Noted: 2020-05-14 | Resolved: 2020-06-25

## 2020-06-25 PROBLEM — Z37.9 NORMAL LABOR: Status: RESOLVED | Noted: 2020-05-14 | Resolved: 2020-06-25

## 2020-06-25 PROBLEM — O36.63X0 EXCESSIVE FETAL GROWTH AFFECTING MANAGEMENT OF PREGNANCY IN THIRD TRIMESTER: Status: RESOLVED | Noted: 2020-05-04 | Resolved: 2020-06-25

## 2020-06-25 PROBLEM — O09.529 AMA (ADVANCED MATERNAL AGE) MULTIGRAVIDA 35+: Status: RESOLVED | Noted: 2020-05-14 | Resolved: 2020-06-25

## 2020-06-25 PROBLEM — O09.529 ANTEPARTUM MULTIGRAVIDA OF ADVANCED MATERNAL AGE: Status: RESOLVED | Noted: 2019-11-11 | Resolved: 2020-06-25

## 2020-06-25 PROBLEM — Z34.90 ENCOUNTER FOR PLANNED INDUCTION OF LABOR: Status: RESOLVED | Noted: 2020-05-14 | Resolved: 2020-06-25

## 2020-07-03 ENCOUNTER — HOSPITAL ENCOUNTER (OUTPATIENT)
Dept: LACTATION | Age: 35
Discharge: HOME | End: 2020-07-03

## 2020-07-03 NOTE — LACTATION NOTE
Weight Check [facility]  Zeke Pérez   [id number]  Lactation Services [phone]    Baby Girl Sophia  Mom:  Ju Locke  Pediatrician [facility]    Date Weight Comments   20 8-9 Birthweight   7-3-20 13-7.8 Naked At [facility] OP Lactation      Average weight gain for the first 3 months is 1oz per day. Minimum weight gain in the first 3 months is 0.5 oz per day. Typically regaining to birth weight by 2 weeks. Baby needs 28-32 oz of breast milk/formula per day based on 7 feedings per day. Baby needs 120 ml/4oz of breast milk/formula per feeding. Handouts given: Tongue Tie. Tongue Tie Aftercare. Baby Girl Sophia. Short, upper labial frenulum. Very little lip flexibility. Only a slight suck blister. Line on tongue and slightly elevated palate, but appears to have good range of motion of tongue. May not be completely lifting to roof of mouth. Frenulum is visible with pressure, but far back attachment. Parents report baby is fussy after all feedings. Not just one time of the day. Mom came in today for assessment of a possible lip tie. She is looking for a referral to a pediatric dentist for further evaluation. Mom reports baby is clicking with feeding. On and off at breast.  Reflux like symptoms. Mom is experiencing vasospasms and pain that may be coming from compression. Discussed ways to help alleviate vasospasm discomfort. Ultimately if vasospasms are caused by compression, stopping the compression is the best option. Baby nursed 30 minutes prior to our appointment, so she did not breastfeed here. Last ate at 0900. Takes 1 side per feeding. R side. Plan:  Upper labial frenulum is indeed tight and would suggest having evaluated by a preferred provider for possible revision. Baby seems to be experiencing reflux symptoms. Mom states of her 6 children, Sophia has been the most fussy and uncomfortable. Mom does not drink milk. Discussed revision may not simple fix all breastfeeding issues and additional therapy and exercises may be necessary. Overall weight gain is quite good. Follow-up:  2 month check up with Ped. Lactation will follow as needed. Encouraged to try heat directly after nursing. Mammary constriction: For breast pain:Rub the chest muscles (not the breast) quite vigorously about 45-60 seconds on the affected side. There are four places to massage: 1) above the breast against the chest wall; 2) between her breasts just to the side of the breast bone; 3) under the breast against her rib cage; 4) on the side of her body, beside her breast, against the rib cage. One of these four areas is likely to be the ideal spot for her to relieve her pain. Basically, what she describes and illustrates is to hold the breast with that side's hand supporting it while the other hand massages the upper pectoralis (above the breast toward the shoulder blade), the inner pectoralis (on the cleavage), the lower pectoralis (underneath coming up form the rib cage), and the serratus (on the outer side into the armpit.)  Pectoral stretches are shown with mother standing in a doorway with her forearm placed against the wall  pointing up with hand flat and the upper arm parallel to the floor. The mother leans in gently, keeping her whole body straight. By raising and lowering the arm, she can stretch the chest muscles in different ways. Emailing with mom's permission to Dr. Cecy Hilario at [facility] and Dr. Jeananne Hodgkins at [facility].

## 2020-07-17 ENCOUNTER — HOSPITAL ENCOUNTER (OUTPATIENT)
Dept: LACTATION | Age: 35
Discharge: HOME | End: 2020-07-17

## 2020-07-17 NOTE — LACTATION NOTE
Marisa Toussaint was in today (20) for her post-op review with Lactation at [facility]. Ryan Devine (mom) reports her nipple pain and discomfort are completely better. Tara Jaeger is still clicking at breast and has reflux symptoms. She is somewhat happier per mom and feeding well. She was NOT happy about oral  assessment. Noted revision on 20 by Dr. Frannie Gongora with [facility]. Mom states she has been doing lip and tongue stretches 6 times per day since. Tongue wound appears to still be slightly open, but healing nicely. Good tongue mobility. Upper labial frenulum looks completely healed, and still seems tight. Poor lip flexibility and remaining frenulum is blanching on lift. Lip does not touch nose. Minor suck blister, but did not have much of a suck blister prior to revision. Discussed with mom it may be worth having Dr. Kati Gann evaluate for re-attachment since Tara Jaeger still has some markers for a lip tie. Mom agreed to me communicating my findings with [facility] and may call for a reattachment check. Email sent to mom and Dr. Ede Casper. Weight Check [facility]  Dilma Mcarthur   6-  Lactation Services [phone]    Baby Girl Omegata Due  Mom:  Kati Gann  Pediatrician Dr. Priyanka Jaquez     Date Weight Comments   20 8-9 Birthweight   7-3-20 13-7.8 Naked At [facility] OP Lactation       Baby was unbelievably angry after oral assessment. Did a quick wetdiaper/onsie weight of 14-15. 5.

## 2022-03-18 PROBLEM — U07.1 COVID-19: Status: ACTIVE | Noted: 2022-01-01

## 2022-03-18 PROBLEM — R42 DIZZINESS: Status: ACTIVE | Noted: 2020-01-27

## 2022-03-19 PROBLEM — D64.9 ANEMIA: Status: ACTIVE | Noted: 2020-02-25

## 2022-03-20 PROBLEM — O09.299 HISTORY OF MACROSOMIA IN INFANT IN PRIOR PREGNANCY, CURRENTLY PREGNANT: Status: ACTIVE | Noted: 2019-05-13

## 2022-03-24 PROBLEM — U07.1 COVID-19: Status: ACTIVE | Noted: 2022-01-01

## 2022-08-17 ENCOUNTER — OFFICE VISIT (OUTPATIENT)
Dept: OBGYN CLINIC | Age: 37
End: 2022-08-17
Payer: COMMERCIAL

## 2022-08-17 VITALS — SYSTOLIC BLOOD PRESSURE: 114 MMHG | WEIGHT: 161 LBS | DIASTOLIC BLOOD PRESSURE: 68 MMHG | BODY MASS INDEX: 26.79 KG/M2

## 2022-08-17 DIAGNOSIS — O36.80X0 PREGNANCY OF UNKNOWN ANATOMIC LOCATION: ICD-10-CM

## 2022-08-17 DIAGNOSIS — N89.8 VAGINAL DISCHARGE: ICD-10-CM

## 2022-08-17 DIAGNOSIS — N73.0 PID (ACUTE PELVIC INFLAMMATORY DISEASE): ICD-10-CM

## 2022-08-17 DIAGNOSIS — N85.2 ENLARGED UTERUS: ICD-10-CM

## 2022-08-17 DIAGNOSIS — R10.2 PELVIC PAIN: ICD-10-CM

## 2022-08-17 DIAGNOSIS — O20.9 BLEEDING IN EARLY PREGNANCY: Primary | ICD-10-CM

## 2022-08-17 DIAGNOSIS — N93.9 VAGINAL BLEEDING: ICD-10-CM

## 2022-08-17 LAB
ERYTHROCYTE [DISTWIDTH] IN BLOOD BY AUTOMATED COUNT: 12 % (ref 11.9–14.6)
HCT VFR BLD AUTO: 39.6 % (ref 35.8–46.3)
HGB BLD-MCNC: 12.8 G/DL (ref 11.7–15.4)
MCH RBC QN AUTO: 29.6 PG (ref 26.1–32.9)
MCHC RBC AUTO-ENTMCNC: 32.3 G/DL (ref 31.4–35)
MCV RBC AUTO: 91.7 FL (ref 79.6–97.8)
NRBC # BLD: 0 K/UL (ref 0–0.2)
PLATELET # BLD AUTO: 281 K/UL (ref 150–450)
PMV BLD AUTO: 11.2 FL (ref 9.4–12.3)
RBC # BLD AUTO: 4.32 M/UL (ref 4.05–5.2)
WBC # BLD AUTO: 6.7 K/UL (ref 4.3–11.1)

## 2022-08-17 PROCEDURE — 76830 TRANSVAGINAL US NON-OB: CPT | Performed by: OBSTETRICS & GYNECOLOGY

## 2022-08-17 PROCEDURE — 99215 OFFICE O/P EST HI 40 MIN: CPT | Performed by: OBSTETRICS & GYNECOLOGY

## 2022-08-17 PROCEDURE — 96372 THER/PROPH/DIAG INJ SC/IM: CPT | Performed by: OBSTETRICS & GYNECOLOGY

## 2022-08-17 RX ORDER — DOXYCYCLINE HYCLATE 100 MG/1
100 CAPSULE ORAL 2 TIMES DAILY
Qty: 28 CAPSULE | Refills: 0 | Status: SHIPPED | OUTPATIENT
Start: 2022-08-17 | End: 2022-08-27

## 2022-08-17 RX ORDER — METRONIDAZOLE 500 MG/1
500 TABLET ORAL 2 TIMES DAILY
Qty: 28 TABLET | Refills: 0 | Status: SHIPPED | OUTPATIENT
Start: 2022-08-17 | End: 2022-08-27

## 2022-08-17 RX ORDER — CEFTRIAXONE SODIUM 250 MG/1
250 INJECTION, POWDER, FOR SOLUTION INTRAMUSCULAR; INTRAVENOUS ONCE
Status: COMPLETED | OUTPATIENT
Start: 2022-08-17 | End: 2022-08-17

## 2022-08-17 RX ADMIN — CEFTRIAXONE SODIUM 250 MG: 250 INJECTION, POWDER, FOR SOLUTION INTRAMUSCULAR; INTRAVENOUS at 15:03

## 2022-08-17 NOTE — PROGRESS NOTES
CC: +UPT with VB     HPI:    Carl Morrison  is a 44 y.o. [id number]  LMP 22 who is seen for c/o new +UPT w/ VB. Last seen 22 for FU for a MAB at that time. Patient's last menstrual period was 2022 (exact date). Rh pos. States had +UPT \"around 4wks\" x 2    Started bleeding on 22 -- heavy bleeding initially w/ clots--continued off and on x 1wk. AMA (35yo-->will be 38yo in a few short days)      Now c/o having sharp bilateral, low pelvic pain that started at the same time the VB began  Occurring daily now  Nothing makes better/worse  No pain w/ sex or vb w/ sex (has not had sex since the VB started, however)  No fevers     +Increase in vaginal discharge      Denies any GI/ sxs     , monogamous           TVUS today:  Uterus 185mL  EMS 9.5mm   Enlarged, heterogenous uterus  Rt ov w/ small complex cyst w/ thin septation   B/l ovs wnl  No FF           Last AE was 22 w/ Dr Bill Oswald:  As per HPI        Last Pap:  21-- neg cotesting          Current Outpatient Medications on File Prior to Visit   Medication Sig Dispense Refill    Multiple Vitamin (MULTIVITAMIN PO) Take by mouth       No current facility-administered medications on file prior to visit.          Past Medical History:   Diagnosis Date    Anemia 2020    COVID-19 2022    COVID-19 vaccine series declined     Dysmenorrhea     History of delivery of macrosomal infant     Menorrhagia     Palpitations 11/10/2015    Uterine fibroid          Past Surgical History:   Procedure Laterality Date    WISDOM TOOTH EXTRACTION           Outpatient Encounter Medications as of 2022   Medication Sig Dispense Refill    metroNIDAZOLE (FLAGYL) 500 MG tablet Take 1 tablet by mouth in the morning and at bedtime for 10 days 28 tablet 0    doxycycline hyclate (VIBRAMYCIN) 100 MG capsule Take 1 capsule by mouth 2 times daily for 10 days 28 capsule 0    Multiple Vitamin (MULTIVITAMIN PO) Take by mouth      [] cefTRIAXone (ROCEPHIN) injection 250 mg        No facility-administered encounter medications on file as of 8/17/2022. No Known Allergies      Family History   Problem Relation Age of Onset    No Known Problems Brother     No Known Problems Sister     No Known Problems Father     No Known Problems Mother     No Known Problems Maternal Aunt     No Known Problems Maternal Uncle     Hypertension Neg Hx     No Known Problems Paternal Uncle     No Known Problems Maternal Grandmother     No Known Problems Maternal Grandfather     No Known Problems Paternal Grandmother     No Known Problems Paternal Grandfather     Diabetes Neg Hx     No Known Problems Paternal Aunt          Social History     Socioeconomic History    Marital status:      Spouse name: None    Number of children: None    Years of education: None    Highest education level: None   Tobacco Use    Smoking status: Never    Smokeless tobacco: Never   Substance and Sexual Activity    Alcohol use: No    Drug use: No           ROS:  Neg except as per HPI          PHYSICAL EXAM:  /68   Wt 161 lb (73 kg)   LMP 05/30/2022 (Exact Date)   BMI 26.79 kg/m²        Physical Exam:  Constitutional: She appears well-developed and well-nourished. No distress. HENT:    Head: Normocephalic and atraumatic. Cardiovascular: Regular pulse   Pulmonary/Chest: Effort normal  Skin: She is not diaphoretic. Psychiatric: She has a normal mood and affect. Her behavior is normal. Thought content normal. .  Abdomen:  S/NTTP/ND, no R/G     Pelvic:    External genitalia wnl, no lesions, rashes  Clitoris and urethra midline  Vagina pink, moist, well rugated, DC fairly scant and appears wnl, +CMT    Cervix without lesion/masses, no CMT  Uterus enlarged, mildly TTP   Adnexa without masses, NTTP         Counseling:  Ectopic precautions given. Needs serial quants. Rh pos. +PID by exam.  The very small R ovarian cyst would not be the source of her pain.      Pelvic inflammatory disease (PID) refers to infection of the upper genital tract in women, involving any or all of the uterus, fallopian tubes, and ovaries. The majority of PID cases (85%) are caused by sexually transmitted pathogens or bacterial vaginosis-associated pathogens. Fewer than 15% of acute PID cases are not sexually transmitted and instead are associated with enteric or respiratory pathogens that have colonized the lower genital tract. The majority of women have mild-moderate disease and only a minority develop peritonitis or pelvic abscess, which are usually manifest by more severe pain, greater tenderness on examination, and systemic features such as fever. Perihepatitis (Rashaad Bees Syndrome) occurs in approx 10% of PID cases and involves inflammation of the liver capsule and peritoneal surfaces of the anterior right upper quadrant. If this were to have been a miscarriage it would be her second consecutive one. AMA alone is a risk factor for her. Especially given prior hx of 6 viable pregnancies , no fam hx recurrent pregnancy losses, and no fam hx VTEs/PE, would not pursue extensive recurrent preganncy loss w/u until 3 consecutive losses. Discussed w/ pt in depth. Patient counseled face to face for more than 50% of the total time spent with the patient. On this date I have spent 56 minutes reviewing previous notes, test results, and face to face with the patient discussing the diagnosis and importance of compliance with the treatment plan as well as documenting.           ASSESSMENT/PLAN:   44 y.o., w/ VB, +UPT, PUL, pelvic pain:    1) PUL, pelvic pain, PID:   -Rh pos  -Quant   -see counseling--ectopic precautions given  -CBC    -urine culture  -NuSwab cxs  -TFTs per pt request  -Rocephin 250mg IM x 1 in office PLUS  -Doxycycline 100mg PO BID x 14 days PLUS  -Flagyl 500mg PO BID x 14 days   -stay on PNV   -possible Adenomyosis but would not typically have such an acute onset timing        Orders Placed This Encounter   Procedures    Culture, Urine     Standing Status:   Future     Number of Occurrences:   1     Standing Expiration Date:   8/17/2023     Order Specific Question:   Specify (ex-cath, midstream, cysto, etc)? Answer:   midstream    NuSwab Vaginitis Plus (VG+) with Canddia (Six Species)     ATLASORDERNUMBER:[id number]:NSVC6L*NuSwab Vagninosis+6sp Candida    AMB POC US, TRANSVAGINAL     Order Specific Question:   Reason for Exam:     Answer:   vaginal bleeding     Order Specific Question:   Are you Pregnant?      Answer:   No    HCG, Quantitative, Pregnancy     Standing Status:   Future     Number of Occurrences:   1     Standing Expiration Date:   8/17/2023    TSH     Standing Status:   Future     Number of Occurrences:   1     Standing Expiration Date:   8/17/2023    T4, Free     Standing Status:   Future     Number of Occurrences:   1     Standing Expiration Date:   8/17/2023    CBC     Standing Status:   Future     Number of Occurrences:   1     Standing Expiration Date:   8/17/2023    W[place] IMMUNIZ ADM THRU 18YR ANY RTE ADDL VAC/TOX Hong Engel MD

## 2022-08-18 ENCOUNTER — TELEPHONE (OUTPATIENT)
Dept: OBGYN CLINIC | Age: 37
End: 2022-08-18

## 2022-08-18 LAB
HCG SERPL-ACNC: 2 MIU/ML (ref 0–6)
T4 FREE SERPL-MCNC: 0.9 NG/DL (ref 0.78–1.46)
TSH, 3RD GENERATION: 2.79 UIU/ML (ref 0.36–3.74)

## 2022-08-18 NOTE — TELEPHONE ENCOUNTER
Pt called stating her pharmacist recommend she not take the Flagyl or Doxycycline that was prescribed to her by Aparna Osborn on 8/17/22 because she is still breastfeeding. Reviewed with . Aparna Osborn states she will contact pt.

## 2022-08-20 LAB
BACTERIA SPEC CULT: NORMAL
SERVICE CMNT-IMP: NORMAL

## 2022-08-22 LAB
A VAGINAE DNA VAG QL NAA+PROBE: NORMAL SCORE
BVAB2 DNA VAG QL NAA+PROBE: NORMAL SCORE
C ALBICANS DNA VAG QL NAA+PROBE: NEGATIVE
C GLABRATA DNA VAG QL NAA+PROBE: NEGATIVE
C TRACH RRNA SPEC QL NAA+PROBE: NEGATIVE
CANDIDA KRUSEI: NEGATIVE
CANDIDA LUSITANIAE, NAA, 180015: NEGATIVE
CANDIDA PARAPSILOSIS/TROPICALIS: NEGATIVE
MEGA1 DNA VAG QL NAA+PROBE: NORMAL SCORE
N GONORRHOEA RRNA SPEC QL NAA+PROBE: NEGATIVE
T VAGINALIS RRNA SPEC QL NAA+PROBE: NEGATIVE

## 2022-12-12 ENCOUNTER — OFFICE VISIT (OUTPATIENT)
Dept: FAMILY MEDICINE CLINIC | Facility: CLINIC | Age: 37
End: 2022-12-12
Payer: COMMERCIAL

## 2022-12-12 VITALS
DIASTOLIC BLOOD PRESSURE: 68 MMHG | HEART RATE: 78 BPM | TEMPERATURE: 96.6 F | WEIGHT: 166 LBS | BODY MASS INDEX: 27.66 KG/M2 | SYSTOLIC BLOOD PRESSURE: 112 MMHG | OXYGEN SATURATION: 100 % | HEIGHT: 65 IN | RESPIRATION RATE: 19 BRPM

## 2022-12-12 DIAGNOSIS — Z00.00 ROUTINE MEDICAL EXAM: Primary | ICD-10-CM

## 2022-12-12 DIAGNOSIS — Z00.00 ROUTINE MEDICAL EXAM: ICD-10-CM

## 2022-12-12 DIAGNOSIS — L21.9 SEBORRHEIC DERMATITIS: ICD-10-CM

## 2022-12-12 LAB
BASOPHILS # BLD: 0.1 K/UL (ref 0–0.2)
BASOPHILS NFR BLD: 1 % (ref 0–2)
DIFFERENTIAL METHOD BLD: NORMAL
EOSINOPHIL # BLD: 0.1 K/UL (ref 0–0.8)
EOSINOPHIL NFR BLD: 1 % (ref 0.5–7.8)
ERYTHROCYTE [DISTWIDTH] IN BLOOD BY AUTOMATED COUNT: 12.1 % (ref 11.9–14.6)
HCT VFR BLD AUTO: 40.2 % (ref 35.8–46.3)
HGB BLD-MCNC: 13.2 G/DL (ref 11.7–15.4)
IMM GRANULOCYTES # BLD AUTO: 0 K/UL (ref 0–0.5)
IMM GRANULOCYTES NFR BLD AUTO: 0 % (ref 0–5)
LYMPHOCYTES # BLD: 1.7 K/UL (ref 0.5–4.6)
LYMPHOCYTES NFR BLD: 27 % (ref 13–44)
MCH RBC QN AUTO: 30.8 PG (ref 26.1–32.9)
MCHC RBC AUTO-ENTMCNC: 32.8 G/DL (ref 31.4–35)
MCV RBC AUTO: 93.7 FL (ref 82–102)
MONOCYTES # BLD: 0.5 K/UL (ref 0.1–1.3)
MONOCYTES NFR BLD: 7 % (ref 4–12)
NEUTS SEG # BLD: 4 K/UL (ref 1.7–8.2)
NEUTS SEG NFR BLD: 64 % (ref 43–78)
NRBC # BLD: 0 K/UL (ref 0–0.2)
PLATELET # BLD AUTO: 288 K/UL (ref 150–450)
PMV BLD AUTO: 10.8 FL (ref 9.4–12.3)
RBC # BLD AUTO: 4.29 M/UL (ref 4.05–5.2)
WBC # BLD AUTO: 6.3 K/UL (ref 4.3–11.1)

## 2022-12-12 PROCEDURE — 99385 PREV VISIT NEW AGE 18-39: CPT | Performed by: FAMILY MEDICINE

## 2022-12-12 RX ORDER — KETOCONAZOLE 20 MG/G
CREAM TOPICAL
Qty: 30 G | Refills: 1 | Status: SHIPPED | OUTPATIENT
Start: 2022-12-12

## 2022-12-12 SDOH — HEALTH STABILITY: PHYSICAL HEALTH: ON AVERAGE, HOW MANY DAYS PER WEEK DO YOU ENGAGE IN MODERATE TO STRENUOUS EXERCISE (LIKE A BRISK WALK)?: 5 DAYS

## 2022-12-12 SDOH — HEALTH STABILITY: PHYSICAL HEALTH: ON AVERAGE, HOW MANY MINUTES DO YOU ENGAGE IN EXERCISE AT THIS LEVEL?: 40 MIN

## 2022-12-12 ASSESSMENT — PATIENT HEALTH QUESTIONNAIRE - PHQ9
SUM OF ALL RESPONSES TO PHQ QUESTIONS 1-9: 0
SUM OF ALL RESPONSES TO PHQ QUESTIONS 1-9: 0
SUM OF ALL RESPONSES TO PHQ9 QUESTIONS 1 & 2: 0
2. FEELING DOWN, DEPRESSED OR HOPELESS: 0
SUM OF ALL RESPONSES TO PHQ QUESTIONS 1-9: 0
1. LITTLE INTEREST OR PLEASURE IN DOING THINGS: 0
SUM OF ALL RESPONSES TO PHQ QUESTIONS 1-9: 0

## 2022-12-12 ASSESSMENT — ENCOUNTER SYMPTOMS
SHORTNESS OF BREATH: 0
CONSTIPATION: 0
VOMITING: 0
DIARRHEA: 0
COUGH: 0

## 2022-12-12 NOTE — PROGRESS NOTES
Jimmy Casillas (:  1985) is a 44 y.o. female,Established patient, here for evaluation of the following chief complaint(s):  New Patient (Wants to see about getting a well check up and getting some blood work done while here.) and Other (Has been over 10 years since the last Tdap shot.)         ASSESSMENT/PLAN:  1. Routine medical exam  -     Comprehensive Metabolic Panel; Future  -     Lipid Panel; Future  -     TSH; Future  -     CBC with Auto Differential; Future  -     Vitamin D 25 Hydroxy; Future  2. Seborrheic dermatitis  -     ketoconazole (NIZORAL) 2 % cream; Apply topically daily. , Disp-30 g, R-1, Normal  I will review labs ordered today and adjust medication regimen as needed. General preventative health recommendations given:   1. Eat a diet rich in protein, fruits, and vegetables and lower in carbohydrates. 2. Exercise at least 150 minutes weekly. 3. Avoid tobacco and excessive alcohol consumption. 4. Get 7-8 hours of sleep each night. No follow-ups on file. Subjective   SUBJECTIVE/OBJECTIVE:  HPI  Chief Complaint   Patient presents with    New Patient     Wants to see about getting a well check up and getting some blood work done while here. Other     Has been over 10 years since the last Tdap shot. Other concerns. .. Has 6 kids and stays home with them. Looking for a provider closer to home. Takes multivitamins and occasional vitamin d supplement. Some more issues with her vision. Has some skin concerns over her cheeks bilaterally. Heavy periods in the past but have improved. Light bleeding for about 5 days a month. Review of Systems   Constitutional:  Negative for diaphoresis and unexpected weight change. HENT:  Negative for congestion. Eyes:  Negative for visual disturbance. Respiratory:  Negative for cough and shortness of breath. Cardiovascular:  Negative for chest pain.    Gastrointestinal:  Negative for constipation, diarrhea and vomiting. Genitourinary:  Negative for dysuria. Neurological:  Negative for headaches. Psychiatric/Behavioral:  Negative for dysphoric mood and sleep disturbance. The patient is not nervous/anxious. Objective   Physical Exam  Vitals reviewed. Constitutional:       Appearance: Normal appearance. HENT:      Head: Normocephalic. Eyes:      Extraocular Movements: Extraocular movements intact. Conjunctiva/sclera: Conjunctivae normal.      Pupils: Pupils are equal, round, and reactive to light. Cardiovascular:      Rate and Rhythm: Normal rate and regular rhythm. Heart sounds: Normal heart sounds. No murmur heard. Pulmonary:      Effort: Pulmonary effort is normal. No respiratory distress. Breath sounds: Normal breath sounds. Abdominal:      General: Bowel sounds are normal.      Palpations: Abdomen is soft. Musculoskeletal:         General: Normal range of motion. Skin:     General: Skin is warm and dry. Comments: Hyperkeratosis w/ some red papules. Worse around nasolabial folds and chin. Neurological:      General: No focal deficit present. Mental Status: She is alert. Psychiatric:         Mood and Affect: Mood normal.         Behavior: Behavior normal.   /68 (Site: Left Upper Arm, Position: Sitting, Cuff Size: Medium Adult)   Pulse 78   Temp (!) 96.6 °F (35.9 °C)   Resp 19   Ht 5' 5\" (1.651 m)   Wt 166 lb (75.3 kg)   LMP 11/22/2022   SpO2 100%   BMI 27.62 kg/m²          An electronic signature was used to authenticate this note.     --Zunilda Patton MD

## 2022-12-13 LAB
25(OH)D3 SERPL-MCNC: 27.6 NG/ML (ref 30–100)
ALBUMIN SERPL-MCNC: 4.2 G/DL (ref 3.5–5)
ALBUMIN/GLOB SERPL: 1.3 (ref 0.4–1.6)
ALP SERPL-CCNC: 62 U/L (ref 50–136)
ALT SERPL-CCNC: 17 U/L (ref 12–65)
ANION GAP SERPL CALC-SCNC: 5 MMOL/L (ref 2–11)
AST SERPL-CCNC: 17 U/L (ref 15–37)
BILIRUB SERPL-MCNC: 0.5 MG/DL (ref 0.2–1.1)
BUN SERPL-MCNC: 14 MG/DL (ref 6–23)
CALCIUM SERPL-MCNC: 9.8 MG/DL (ref 8.3–10.4)
CHLORIDE SERPL-SCNC: 107 MMOL/L (ref 101–110)
CHOLEST SERPL-MCNC: 175 MG/DL
CO2 SERPL-SCNC: 28 MMOL/L (ref 21–32)
CREAT SERPL-MCNC: 0.8 MG/DL (ref 0.6–1)
GLOBULIN SER CALC-MCNC: 3.3 G/DL (ref 2.8–4.5)
GLUCOSE SERPL-MCNC: 96 MG/DL (ref 65–100)
HDLC SERPL-MCNC: 52 MG/DL (ref 40–60)
HDLC SERPL: 3.4
LDLC SERPL CALC-MCNC: 98.4 MG/DL
POTASSIUM SERPL-SCNC: 4.4 MMOL/L (ref 3.5–5.1)
PROT SERPL-MCNC: 7.5 G/DL (ref 6.3–8.2)
SODIUM SERPL-SCNC: 140 MMOL/L (ref 133–143)
TRIGL SERPL-MCNC: 123 MG/DL (ref 35–150)
TSH, 3RD GENERATION: 2.49 UIU/ML (ref 0.36–3.74)
VLDLC SERPL CALC-MCNC: 24.6 MG/DL (ref 6–23)

## 2022-12-14 ENCOUNTER — TELEPHONE (OUTPATIENT)
Dept: FAMILY MEDICINE CLINIC | Facility: CLINIC | Age: 37
End: 2022-12-14

## 2022-12-14 NOTE — TELEPHONE ENCOUNTER
----- Message from Peyman Carmona MD sent at 12/13/2022  4:47 PM EST -----  Vitamin d is low. She should take a daily vit D3 5000iu and we can recheck in a few months. Everything else looked fine.

## 2023-01-19 ENCOUNTER — OFFICE VISIT (OUTPATIENT)
Dept: OBGYN CLINIC | Age: 38
End: 2023-01-19

## 2023-01-19 ENCOUNTER — HOSPITAL ENCOUNTER (OUTPATIENT)
Dept: LAB | Age: 38
Setting detail: SPECIMEN
Discharge: HOME | End: 2023-01-22

## 2023-01-19 VITALS
DIASTOLIC BLOOD PRESSURE: 62 MMHG | SYSTOLIC BLOOD PRESSURE: 102 MMHG | WEIGHT: 164 LBS | BODY MASS INDEX: 27.32 KG/M2 | HEIGHT: 65 IN

## 2023-01-19 DIAGNOSIS — Z28.310 COVID-19 VACCINE SERIES DECLINED: ICD-10-CM

## 2023-01-19 DIAGNOSIS — Z64.1 GRAND MULTIPARA: ICD-10-CM

## 2023-01-19 DIAGNOSIS — Z13.29 SCREENING FOR THYROID DISORDER: ICD-10-CM

## 2023-01-19 DIAGNOSIS — Z13.1 SCREENING FOR DIABETES MELLITUS: ICD-10-CM

## 2023-01-19 DIAGNOSIS — Z28.21 COVID-19 VACCINE SERIES DECLINED: ICD-10-CM

## 2023-01-19 DIAGNOSIS — O09.521 AMA (ADVANCED MATERNAL AGE) MULTIGRAVIDA 35+, FIRST TRIMESTER: ICD-10-CM

## 2023-01-19 DIAGNOSIS — O09.299 HISTORY OF MACROSOMIA IN INFANT IN PRIOR PREGNANCY, CURRENTLY PREGNANT: ICD-10-CM

## 2023-01-19 DIAGNOSIS — N92.6 MISSED MENSES: Primary | ICD-10-CM

## 2023-01-19 DIAGNOSIS — O09.529 ANTEPARTUM MULTIGRAVIDA OF ADVANCED MATERNAL AGE: ICD-10-CM

## 2023-01-19 PROCEDURE — 87086 URINE CULTURE/COLONY COUNT: CPT

## 2023-01-19 PROCEDURE — 84443 ASSAY THYROID STIM HORMONE: CPT

## 2023-01-19 PROCEDURE — 86762 RUBELLA ANTIBODY: CPT

## 2023-01-19 PROCEDURE — 87340 HEPATITIS B SURFACE AG IA: CPT

## 2023-01-19 PROCEDURE — 85025 COMPLETE CBC W/AUTO DIFF WBC: CPT

## 2023-01-19 PROCEDURE — 86803 HEPATITIS C AB TEST: CPT

## 2023-01-19 PROCEDURE — 86900 BLOOD TYPING SEROLOGIC ABO: CPT

## 2023-01-19 PROCEDURE — 86901 BLOOD TYPING SEROLOGIC RH(D): CPT

## 2023-01-19 PROCEDURE — 86850 RBC ANTIBODY SCREEN: CPT

## 2023-01-19 PROCEDURE — 84439 ASSAY OF FREE THYROXINE: CPT

## 2023-01-19 PROCEDURE — 86592 SYPHILIS TEST NON-TREP QUAL: CPT

## 2023-01-19 PROCEDURE — 87389 HIV-1 AG W/HIV-1&-2 AB AG IA: CPT

## 2023-01-19 PROCEDURE — 83036 HEMOGLOBIN GLYCOSYLATED A1C: CPT

## 2023-01-19 RX ORDER — DOXYLAMINE SUCCINATE AND PYRIDOXINE HYDROCHLORIDE 20; 20 MG/1; MG/1
1 TABLET, EXTENDED RELEASE ORAL 2 TIMES DAILY
Qty: 60 TABLET | Refills: 1 | Status: SHIPPED | OUTPATIENT
Start: 2023-01-19

## 2023-01-19 RX ORDER — ONDANSETRON 8 MG/1
8 TABLET, ORALLY DISINTEGRATING ORAL EVERY 8 HOURS PRN
Qty: 30 TABLET | Refills: 2 | Status: SHIPPED | OUTPATIENT
Start: 2023-01-19

## 2023-01-19 NOTE — PROGRESS NOTES
CC:  Missed Period      HPI:  44 y.o. [id number] presents for pregnancy confirmation and establish ANDREA. Patient's last menstrual period was 2022 (approximate). ,   sure, regular cycles.       mild n/v  no vb/cramping     SAHM     Not COVID-19 vaccinated --has been strongly counseled       OB hx:  G1 08 term   G2 6/8/10 term   G3 12 term    G4 3/7/15 term   G5 17  at 41w5d (9#6.4)  G6 2019 MAB at 10w6d w/ Cystic hygroma  (NIPT low risk); no D&C required   G7 2020  at 39w0d after elective IOL  (8#8.7)  G8 3/17/22 at 6w4d SAB  G9 current           AMA (42yo), Grand multip:   MFM referral   ASA 162mg   Genetics __         Hx macrosomic infant:  Hgb A1C __  Reports pelvis proven 10#  Denies any hx GDM; normal BMI   Reports has Never  Performed glucola in past w/ previous pregnancies   ---last pregnancy stated they just had her come in fasting then michael her blood an hour after eating a random meal  States a lot of sugar makes her sick and would prefer to not perform the glucola at 28wks  Declined glucola w/ last pregnancy and checked FSBG   Watch growth         Fam hx any chromosomal or inheritable disorders: denies       Her Ob history is as follows:  # 1 - Date: 08, Sex: Female, Weight: None, GA: None, Delivery: Vaginal, Spontaneous, Apgar1: None, Apgar5: None, Living: Living, Birth Comments: None    # 2 - Date: 06/08/10, Sex: Male, Weight: None, GA: None, Delivery: Vaginal, Spontaneous, Apgar1: None, Apgar5: None, Living: Living, Birth Comments: None    # 3 - Date: 12, Sex: Female, Weight: None, GA: None, Delivery: Vaginal, Spontaneous, Apgar1: None, Apgar5: None, Living: Living, Birth Comments: None    # 4 - Date: 03/07/15, Sex: Male, Weight: None, GA: None, Delivery: Vaginal, Spontaneous, Apgar1: None, Apgar5: None, Living: Living, Birth Comments: None    # 5 - Date: 17, Sex: Female, Weight: 9 lb 6.4 oz (4.265 kg), GA: 41w5d, Delivery: Vaginal, Spontaneous, Apgar1: 9, Apgar5: 9, Living: Living, Birth Comments: None    # 6 - Date: 06/11/19, Sex: None, Weight: None, GA: None, Delivery: None, Apgar1: None, Apgar5: None, Living: None, Birth Comments: None    # 7 - Date: 05/14/20, Sex: Female, Weight: 8 lb 8.7 oz (3.875 kg), GA: 39w0d, Delivery: Vaginal, Spontaneous, Apgar1: 9, Apgar5: 9, Living: Living, Birth Comments: None    # 8 - Date: 03/17/22, Sex: None, Weight: None, GA: 6w4d, Delivery: None, Apgar1: None, Apgar5: None, Living: None, Birth Comments: None    # 9 - Date: None, Sex: None, Weight: None, GA: None, Delivery: None, Apgar1: None, Apgar5: None, Living: None, Birth Comments: None      Past medical History: Active Ambulatory Problems     Diagnosis Date Noted    Dizziness 01/27/2020    Anemia 02/25/2020    History of macrosomia in infant in prior pregnancy, currently pregnant 05/13/2019    COVID-19 01/01/2022    COVID-19 vaccine series declined     Uterine fibroid     Menorrhagia     Dysmenorrhea     AMA (advanced maternal age) multigravida 35+ 01/22/2023    [address] multipara 01/22/2023     Resolved Ambulatory Problems     Diagnosis Date Noted    No Resolved Ambulatory Problems     Past Medical History:   Diagnosis Date    History of delivery of macrosomal infant     Palpitations 11/10/2015       Current Outpatient Medications   Medication Sig    Prenatal MV & Min w/FA-DHA (ONE A DAY PRENATAL PO) Take by mouth    ondansetron (ZOFRAN-ODT) 8 MG TBDP disintegrating tablet Place 1 tablet under the tongue every 8 hours as needed for Nausea or Vomiting    Doxylamine-Pyridoxine ER (BONJESTA) 20-20 MG TBCR Take 1 tablet by mouth in the morning and at bedtime     No current facility-administered medications for this visit. Past Surgical:  has a past surgical history that includes [place] tooth extraction.     No Known Allergies      Current Outpatient Medications on File Prior to Visit   Medication Sig Dispense Refill    Prenatal MV & Min w/FA-DHA (ONE A DAY PRENATAL PO) Take by mouth       No current facility-administered medications on file prior to visit. GYN hx:  Last Pap:   1/4/21-- neg cotesting   Hx Abnl Pap:    no     Hx STDs:  no       Clark Coleman  reports that she has never smoked. She has never used smokeless tobacco. She reports that she does not drink alcohol and does not use drugs. /62   Ht 5' 5\" (1.651 m)   Wt 164 lb (74.4 kg)   LMP 11/22/2022 (Approximate)   BMI 27.29 kg/m²     Physical Exam:  Constitutional: She appears well-developed and well-nourished. No distress. HENT:    Head: Normocephalic and atraumatic. Cardiovascular: Regular pulse   Pulmonary/Chest: Effort normal  Skin: She is not diaphoretic. Psychiatric: She has a normal mood and affect. Her behavior is normal. Thought content normal. .          Pelvic US today:  LMP 11/22/2022 ANDREA(LMP) 08/29/2023 GA(LMP) 8w2d  GA(AUA) 8w3d  ANDREA(AUA) 08/28/2023    + IUP WITH + FHM  + YOLK SAC  CRL= 8.3WKS  THIN [address] ANTERIOR TO THE GS MEASURES 1.9 X 0.4 X 0.9CM. RT OV- WNL  LT OV- WNL  NO FF                  Counseling:  -Continue PNV with at least 055AWR Folic acid QD (4mg if previous pregnancy complicated by a fetal NTD)  -B6/Unisom (Diclegis) if nausea/vomitin g  -Calcium:  recommend 1000mg/QD  (500 in 2 Tums), milk, cheese, yogurt, leafy green vegetables  -Iron:  30mg every day (red meat, dark beans)  -Counseled on appropriate foods to avoid in pregnancy:   -unpasteurized milk/cheeses   -hot dogs, luncheon meats, cold cuts unless heated until steaming    -refrigerated carrillo and meat spreads   -raw/undercooked seafood, eggs, meat  -Avoid litter box if have cat(s)   -Genetic screening options discussed          Patient counseled face to face for more than 50% of the total time spent with the patient.   On this date I have spent 36 minutes reviewing previous notes, test results, and face to face with the patient discussing the diagnosis and importance of compliance with the treatment plan as well as documenting.           Assessment/Plan:    44 y.o. [id number] at [address] [place] [address] by d=8wk US with  IUP:    1) Routine pregnancy:  -PN labs today  -FU w/ RN in 1-2 wks for new pregnancy counseling   -RTO for New OB visti in 4wks   -Hgb A1C  -desires TSH, Free T4   -MFM referral           Evelyn See MD

## 2023-01-20 LAB
ABO + RH BLD: NORMAL
BASOPHILS # BLD: 0 K/UL (ref 0–0.2)
BASOPHILS NFR BLD: 1 % (ref 0–2)
BLOOD GROUP ANTIBODIES SERPL: NORMAL
DIFFERENTIAL METHOD BLD: NORMAL
EOSINOPHIL # BLD: 0.1 K/UL (ref 0–0.8)
EOSINOPHIL NFR BLD: 1 % (ref 0.5–7.8)
ERYTHROCYTE [DISTWIDTH] IN BLOOD BY AUTOMATED COUNT: 12.1 % (ref 11.9–14.6)
EST. AVERAGE GLUCOSE BLD GHB EST-MCNC: 105 MG/DL
HBA1C MFR BLD: 5.3 % (ref 4.8–5.6)
HBV SURFACE AG SER QL: NONREACTIVE
HCT VFR BLD AUTO: 39.1 % (ref 35.8–46.3)
HCV AB SER QL: NONREACTIVE
HGB BLD-MCNC: 13.1 G/DL (ref 11.7–15.4)
HIV 1+2 AB+HIV1 P24 AG SERPL QL IA: NONREACTIVE
HIV 1/2 RESULT COMMENT: NORMAL
IMM GRANULOCYTES # BLD AUTO: 0 K/UL (ref 0–0.5)
IMM GRANULOCYTES NFR BLD AUTO: 0 % (ref 0–5)
LYMPHOCYTES # BLD: 1.5 K/UL (ref 0.5–4.6)
LYMPHOCYTES NFR BLD: 18 % (ref 13–44)
MCH RBC QN AUTO: 30.8 PG (ref 26.1–32.9)
MCHC RBC AUTO-ENTMCNC: 33.5 G/DL (ref 31.4–35)
MCV RBC AUTO: 91.8 FL (ref 82–102)
MONOCYTES # BLD: 0.4 K/UL (ref 0.1–1.3)
MONOCYTES NFR BLD: 5 % (ref 4–12)
NEUTS SEG # BLD: 6.3 K/UL (ref 1.7–8.2)
NEUTS SEG NFR BLD: 75 % (ref 43–78)
NRBC # BLD: 0 K/UL (ref 0–0.2)
PLATELET # BLD AUTO: 293 K/UL (ref 150–450)
PMV BLD AUTO: 11.1 FL (ref 9.4–12.3)
RBC # BLD AUTO: 4.26 M/UL (ref 4.05–5.2)
RPR SER QL: NONREACTIVE
RUBV IGG SERPL IA-ACNC: 384.8 IU/ML (ref 0–50)
T4 FREE SERPL-MCNC: 1.1 NG/DL (ref 0.78–1.46)
TSH, 3RD GENERATION: 2.5 UIU/ML (ref 0.36–3.74)
WBC # BLD AUTO: 8.3 K/UL (ref 4.3–11.1)

## 2023-01-22 PROBLEM — O09.529 AMA (ADVANCED MATERNAL AGE) MULTIGRAVIDA 35+: Status: ACTIVE | Noted: 2023-01-22

## 2023-01-22 PROBLEM — Z64.1 GRAND MULTIPARA: Status: ACTIVE | Noted: 2023-01-22

## 2023-01-22 PROBLEM — O09.299 HISTORY OF MACROSOMIA IN INFANT IN PRIOR PREGNANCY, CURRENTLY PREGNANT: Status: ACTIVE | Noted: 2019-05-13

## 2023-01-22 LAB
BACTERIA SPEC CULT: NORMAL
SERVICE CMNT-IMP: NORMAL

## 2023-02-01 ENCOUNTER — NURSE ONLY (OUTPATIENT)
Dept: OBGYN CLINIC | Age: 38
End: 2023-02-01

## 2023-02-01 DIAGNOSIS — O09.521 MULTIGRAVIDA OF ADVANCED MATERNAL AGE IN FIRST TRIMESTER: Primary | ICD-10-CM

## 2023-02-01 NOTE — PROGRESS NOTES
NOB consult with pt via telephone encounter. Labs today: none. Offered pt the option of CF, SMA, and Fragile X carrier testing. Pt declines testing. Offered pt the option of genetic screening (1st screen vs Tetra vs NIPT). Pt has 1st screen with MFM on 23. Instructed pt on exercise/nutrition in pregnancy. Reviewed [facility] preg book. Advised pt on using [facility] for hospital needs and [facility] L&D for pregnancy related emergencies. Pt states understanding. NOB forms signed, scanned, and given to pt. Medical Hx: Anemia. COVID-19. Dysmenorrhea. H/o delivery of macrosomal infant. Menorrhagia. Palpitations. Uterine fibroids. Surgical Hx: [place] tooth extraction. Last Pap: 21 WNL. Pt notified pap smear and std screening will be done at [facility]. Pt OB c/o: none    Fam hx any chromosomal or inheritable disorders: none     COVID Vaccine: declines    Flu Vaccine: declines    OB hx:    G1 08 term    G2 6/8/10 term    G3 12 term     G4 3/7/15 term    G5 17  at 41w5d (9#6.4)   G6 2019 MAB at 10w6d w/ Cystic hygroma  (NIPT low risk); no D&C required    G7 2020  at 39w0d after elective IOL  (8#8.7)   G8 3/17/22 at 6w4d SAB   G9 current      [facility]: MFM on 23. NOBEX with  on 23.

## 2023-02-09 PROBLEM — O09.521 MULTIGRAVIDA OF ADVANCED MATERNAL AGE IN FIRST TRIMESTER: Status: ACTIVE | Noted: 2023-01-22

## 2023-02-09 PROBLEM — Z86.2 HISTORY OF ANEMIA: Status: ACTIVE | Noted: 2020-02-25

## 2023-02-09 PROBLEM — O09.41 SUPERVISION OF HIGH-RISK PREGNANCY WITH GRAND MULTIPARITY IN FIRST TRIMESTER: Status: ACTIVE | Noted: 2023-01-22

## 2023-02-16 ENCOUNTER — ROUTINE PRENATAL (OUTPATIENT)
Dept: OBGYN CLINIC | Age: 38
End: 2023-02-16

## 2023-02-16 ENCOUNTER — HOSPITAL ENCOUNTER (OUTPATIENT)
Dept: LAB | Age: 38
Setting detail: SPECIMEN
Discharge: HOME | End: 2023-02-19

## 2023-02-16 VITALS — WEIGHT: 165 LBS | DIASTOLIC BLOOD PRESSURE: 67 MMHG | SYSTOLIC BLOOD PRESSURE: 116 MMHG | BODY MASS INDEX: 27.46 KG/M2

## 2023-02-16 DIAGNOSIS — O09.521 MULTIGRAVIDA OF ADVANCED MATERNAL AGE IN FIRST TRIMESTER: ICD-10-CM

## 2023-02-16 DIAGNOSIS — O09.41 SUPERVISION OF HIGH-RISK PREGNANCY WITH GRAND MULTIPARITY IN FIRST TRIMESTER: Primary | ICD-10-CM

## 2023-02-16 DIAGNOSIS — O09.521 AMA (ADVANCED MATERNAL AGE) MULTIGRAVIDA 35+, FIRST TRIMESTER: ICD-10-CM

## 2023-02-16 DIAGNOSIS — Z11.3 SCREEN FOR STD (SEXUALLY TRANSMITTED DISEASE): ICD-10-CM

## 2023-02-16 DIAGNOSIS — Z12.4 SCREENING FOR CERVICAL CANCER: ICD-10-CM

## 2023-02-16 DIAGNOSIS — O09.299 HISTORY OF MACROSOMIA IN INFANT IN PRIOR PREGNANCY, CURRENTLY PREGNANT: ICD-10-CM

## 2023-02-16 DIAGNOSIS — Z86.2 HISTORY OF ANEMIA: ICD-10-CM

## 2023-02-16 PROCEDURE — 87624 HPV HI-RISK TYP POOLED RSLT: CPT

## 2023-02-16 PROCEDURE — 87491 CHLMYD TRACH DNA AMP PROBE: CPT

## 2023-02-16 PROCEDURE — 87661 TRICHOMONAS VAGINALIS AMPLIF: CPT

## 2023-02-16 PROCEDURE — 88175 CYTOPATH C/V AUTO FLUID REDO: CPT

## 2023-02-16 PROCEDURE — 87591 N.GONORRHOEAE DNA AMP PROB: CPT

## 2023-02-16 RX ORDER — LANSOPRAZOLE 30 MG/1
30 CAPSULE, DELAYED RELEASE ORAL DAILY
Qty: 30 CAPSULE | Refills: 3 | Status: SHIPPED | OUTPATIENT
Start: 2023-02-16

## 2023-02-16 NOTE — PROGRESS NOTES
CC: New OB exam    HPI:   44 y.o.  [id number] presents today for a New OB examination at 12w2d by d=8wks US.         Genetics:  DECLINED     Pt CANCELED apt w/ MFM today  due to the high deductible        mild n/v  no vb/cramping     SAHM      Not COVID-19 vaccinated --has been strongly counseled        OB hx:  G1 08 term   G2 6/8/10 term   G3 12 term    G4 3/7/15 term   G5 17  at 41w5d (9#6.4)  G6 2019 MAB at 10w6d w/ Cystic hygroma  (NIPT low risk); no D&C required   G7 2020  at 39w0d after elective IOL  (8#8.7)  G8 3/17/22 at 6w4d SAB  G9 current               AMA (44yo), Grand multip:   MFM referral   ASA 162mg   Now DECLINES mfm  referral   Genetics referral           Hx macrosomic infant:  Hgb A1c 5.3  Reports pelvis proven 10#  Denies any hx GDM; normal BMI   Reports has Never  Performed glucola in past w/ previous pregnancies   ---last pregnancy stated they just had her come in fasting then michael her blood an hour after eating a random meal  States a lot of sugar makes her sick and would prefer to not perform the glucola at 28wks  Declined glucola w/ last pregnancy and checked FSBG   Watch growth          TFTs within normal limits  Prenatal labs within normal limits       Fam hx any chromosomal or inheritable disorders: denies         OB HISTORY:   OB History          9    Para   6    Term   6       0    AB   2    Living   6         SAB        IAB        Ectopic        Molar        Multiple        Live Births   6                     GYN HISTORY:  Last Pap:   21-- neg cotesting   Hx Abnl Pap:    no     Hx STDs:  no              Past Medical History:   Diagnosis Date    Anemia 2020    COVID-19 2022    COVID-19 vaccine series declined     Dysmenorrhea     History of delivery of macrosomal infant     Menorrhagia     Palpitations 11/10/2015    Uterine fibroid          Past Surgical History:   Procedure Laterality Date    WISDOM TOOTH EXTRACTION Outpatient Encounter Medications as of 2/16/2023   Medication Sig Dispense Refill    Prenatal MV & Min w/FA-DHA (ONE A DAY PRENATAL PO) Take by mouth      [DISCONTINUED] ondansetron (ZOFRAN-ODT) 8 MG TBDP disintegrating tablet Place 1 tablet under the tongue every 8 hours as needed for Nausea or Vomiting (Patient not taking: Reported on 2/1/2023) 30 tablet 2    [DISCONTINUED] Doxylamine-Pyridoxine ER (BONJESTA) 20-20 MG TBCR Take 1 tablet by mouth in the morning and at bedtime (Patient not taking: Reported on 2/1/2023) 60 tablet 1     No facility-administered encounter medications on file as of 2/16/2023. No Known Allergies      Family History   Problem Relation Age of Onset    No Known Problems Brother     No Known Problems Sister     No Known Problems Father     No Known Problems Mother     No Known Problems Maternal Aunt     No Known Problems Maternal Uncle     Hypertension Neg Hx     No Known Problems Paternal Uncle     No Known Problems Maternal Grandmother     No Known Problems Maternal Grandfather     No Known Problems Paternal Grandmother     No Known Problems Paternal Grandfather     Diabetes Neg Hx     No Known Problems Paternal Aunt          Social History     Socioeconomic History    Marital status:    Tobacco Use    Smoking status: Never    Smokeless tobacco: Never   Vaping Use    Vaping Use: Never used   Substance and Sexual Activity    Alcohol use: No    Drug use: No    Sexual activity: Yes     Partners: Male     Birth control/protection: None     Social Determinants of Health     Physical Activity: Sufficiently Active    Days of Exercise per Week: 5 days    Minutes of Exercise per Session: 40 min   Intimate Partner Violence: Not At Risk    Fear of Current or Ex-Partner: No    Emotionally Abused: No    Physically Abused: No    Sexually Abused: No           ROS:  Review of Systems   Constitutional: Negative for chills, fever and weight loss. HENT: Negative for hearing loss. Eyes: Negative for blurred vision and double vision. Respiratory: Negative for cough, hemoptysis and shortness of breath. Cardiovascular: Negative for chest pain, palpitations and orthopnea. Gastrointestinal: Negative for abdominal pain, blood in stool, constipation, diarrhea, nausea and vomiting. Genitourinary: Negative for dysuria, frequency, hematuria and urgency. Musculoskeletal: Negative for falls, joint pain and myalgias. Skin: Negative for itching and rash. Neurological: Negative for headaches. Endo/Heme/Allergies: Does not bruise/bleed easily. Psychiatric/Behavioral: Negative for depression and suicidal ideas. The patient is not nervous/anxious. All other systems reviewed and are negative. PHYSICAL EXAM:  Blood pressure 116/67, weight 165 lb (74.8 kg), last menstrual period 11/22/2022. Constitutional: She appears well-developed and well-nourished. No distress. HENT:    Head: Normocephalic and atraumatic. Cardiovascular: Normal rate, regular rhythm and normal heart sounds. Exam reveals no gallop and no friction rub. No murmur heard. Pulmonary/Chest: Effort normal and breath sounds normal. No respiratory distress. She has no wheezes. She has no rales. Abdominal: Soft. Bowel sounds are normal. There is no tenderness. There is no rebound and no guarding. Gravid. Skin: She is not diaphoretic. Psychiatric: She has a normal mood and affect.  Her behavior is normal. Thought content normal. .    Pelvic:   External genitalia wnl, no lesions, rashes  Clitoris and urethra midline  Vagina pink, moist, well rugated  Cervix without lesion/masses, DC wnl  Uterus gravid size (12 wks), NTTP  Adnexa without masses, NTTP    Breasts:   Symmetric, no lesions, masses, rashes, no abnl nipple Dc         ASSESSMENT/PLAN:   44 y.o., [id number], for New OB exam at 12w2d :     1) New OB:   -  Cotesting today w/ std testing   -PN labs   wnl   -Rx  Prevacid   -Flu vaccine recommended -declines  Genetics   -DECLINES MFM referral due to high deductible   -ASA  162mg (12-36wks)    -RTO 4wks for Shirlene Curtis MD

## 2023-02-26 ENCOUNTER — HOSPITAL ENCOUNTER (EMERGENCY)
Age: 38
Discharge: HOME | End: 2023-02-26
Attending: EMERGENCY MEDICINE
Payer: COMMERCIAL

## 2023-02-26 VITALS
BODY MASS INDEX: 26.49 KG/M2 | TEMPERATURE: 98.5 F | RESPIRATION RATE: 17 BRPM | HEART RATE: 89 BPM | WEIGHT: 159 LBS | HEIGHT: 65 IN | DIASTOLIC BLOOD PRESSURE: 65 MMHG | OXYGEN SATURATION: 98 % | SYSTOLIC BLOOD PRESSURE: 100 MMHG

## 2023-02-26 DIAGNOSIS — O20.0 THREATENED MISCARRIAGE: Primary | ICD-10-CM

## 2023-02-26 LAB
APPEARANCE UR: CLEAR
BACTERIA URNS QL MICRO: NEGATIVE /HPF
BASOPHILS # BLD: 0 K/UL (ref 0–0.2)
BASOPHILS NFR BLD: 0 % (ref 0–2)
BILIRUB UR QL: NEGATIVE
CASTS URNS QL MICRO: ABNORMAL /LPF
COLOR UR: ABNORMAL
DIFFERENTIAL METHOD BLD: ABNORMAL
EOSINOPHIL # BLD: 0.1 K/UL (ref 0–0.8)
EOSINOPHIL NFR BLD: 1 % (ref 0.5–7.8)
EPI CELLS #/AREA URNS HPF: ABNORMAL /HPF
ERYTHROCYTE [DISTWIDTH] IN BLOOD BY AUTOMATED COUNT: 12.4 % (ref 11.9–14.6)
GLUCOSE UR STRIP.AUTO-MCNC: NEGATIVE MG/DL
HCG SERPL-ACNC: ABNORMAL MIU/ML (ref 0–6)
HCT VFR BLD AUTO: 33.1 % (ref 35.8–46.3)
HGB BLD-MCNC: 11.4 G/DL (ref 11.7–15.4)
HGB UR QL STRIP: ABNORMAL
IMM GRANULOCYTES # BLD AUTO: 0 K/UL (ref 0–0.5)
IMM GRANULOCYTES NFR BLD AUTO: 0 % (ref 0–5)
KETONES UR QL STRIP.AUTO: NEGATIVE MG/DL
LEUKOCYTE ESTERASE UR QL STRIP.AUTO: ABNORMAL
LYMPHOCYTES # BLD: 1.7 K/UL (ref 0.5–4.6)
LYMPHOCYTES NFR BLD: 20 % (ref 13–44)
MCH RBC QN AUTO: 30.7 PG (ref 26.1–32.9)
MCHC RBC AUTO-ENTMCNC: 34.4 G/DL (ref 31.4–35)
MCV RBC AUTO: 89.2 FL (ref 82–102)
MONOCYTES # BLD: 0.5 K/UL (ref 0.1–1.3)
MONOCYTES NFR BLD: 6 % (ref 4–12)
MUCOUS THREADS URNS QL MICRO: 0 /LPF
NEUTS SEG # BLD: 6.2 K/UL (ref 1.7–8.2)
NEUTS SEG NFR BLD: 73 % (ref 43–78)
NITRITE UR QL STRIP.AUTO: NEGATIVE
NRBC # BLD: 0 K/UL (ref 0–0.2)
PH UR STRIP: 6 (ref 5–9)
PLATELET # BLD AUTO: 294 K/UL (ref 150–450)
PMV BLD AUTO: 9.6 FL (ref 9.4–12.3)
PROT UR STRIP-MCNC: NEGATIVE MG/DL
RBC # BLD AUTO: 3.71 M/UL (ref 4.05–5.2)
RBC #/AREA URNS HPF: ABNORMAL /HPF
SP GR UR REFRACTOMETRY: 1.01 (ref 1–1.02)
URINE CULTURE IF INDICATED: ABNORMAL
UROBILINOGEN UR QL STRIP.AUTO: 0.2 EU/DL (ref 0.2–1)
WBC # BLD AUTO: 8.6 K/UL (ref 4.3–11.1)
WBC URNS QL MICRO: ABNORMAL /HPF

## 2023-02-26 PROCEDURE — 81001 URINALYSIS AUTO W/SCOPE: CPT

## 2023-02-26 PROCEDURE — 99283 EMERGENCY DEPT VISIT LOW MDM: CPT

## 2023-02-26 PROCEDURE — 85025 COMPLETE CBC W/AUTO DIFF WBC: CPT

## 2023-02-26 PROCEDURE — 84702 CHORIONIC GONADOTROPIN TEST: CPT

## 2023-02-26 ASSESSMENT — LIFESTYLE VARIABLES
HOW MANY STANDARD DRINKS CONTAINING ALCOHOL DO YOU HAVE ON A TYPICAL DAY: PATIENT DOES NOT DRINK
HOW OFTEN DO YOU HAVE A DRINK CONTAINING ALCOHOL: NEVER

## 2023-02-26 ASSESSMENT — ENCOUNTER SYMPTOMS
ABDOMINAL PAIN: 0
VOMITING: 0
SHORTNESS OF BREATH: 0
BACK PAIN: 0
NAUSEA: 0

## 2023-02-26 NOTE — ED NOTES
I have reviewed discharge instructions with the patient. The patient verbalized understanding. Patient left ED via Discharge Method: ambulatory to Home with family/friend. Opportunity for questions and clarification provided. Patient given 0 scripts. To continue your aftercare when you leave the hospital, you may receive an automated call from our care team to check in on how you are doing. This is a free service and part of our promise to provide the best care and service to meet your aftercare needs.  If you have questions, or wish to unsubscribe from this service please call [phone]. Thank you for Choosing our [facility] Emergency Department. [facility]  02/26/23 3557

## 2023-02-26 NOTE — ED PROVIDER NOTES
[employer] Emergency Department Provider Note                   PCP:                Sukhdeep Anderson MD               Age: 44 y.o. Sex: female       Radha Geronimo is a 44 y.o. female who presents to the Emergency Department with chief complaint of    Chief Complaint   Patient presents with    Vaginal Bleeding      Patient is 14 weeks pregnant presents for vaginal bleeding. She states for the past 3 days she has had some mild vaginal spotting. She states around 5 AM today she started having heavier vaginal bleeding that is similar to a menstrual period. She did not see any clots. Patient denies any pelvic pain or cramping. Patient has been followed by OB/Gyn and has had an ultrasound that was normal.     The history is provided by the patient. All other systems reviewed and are negative. Review of Systems   Constitutional:  Negative for chills, fatigue and fever. Respiratory:  Negative for shortness of breath. Cardiovascular:  Negative for chest pain. Gastrointestinal:  Negative for abdominal pain, nausea and vomiting. Genitourinary:  Positive for vaginal bleeding. Negative for dysuria, pelvic pain and vaginal pain. Musculoskeletal:  Negative for back pain. Neurological:  Negative for dizziness and headaches.      Past Medical History:   Diagnosis Date    Anemia 2/25/2020    COVID-19 01/2022    COVID-19 vaccine series declined     Dysmenorrhea     History of delivery of macrosomal infant     Menorrhagia     Palpitations 11/10/2015    Uterine fibroid         Past Surgical History:   Procedure Laterality Date    WISDOM TOOTH EXTRACTION          Family History   Problem Relation Age of Onset    No Known Problems Brother     No Known Problems Sister     No Known Problems Father     No Known Problems Mother     No Known Problems Maternal Aunt     No Known Problems Maternal Uncle     Hypertension Neg Hx     No Known Problems Paternal Uncle     No Known Problems Maternal Grandmother No Known Problems Maternal Grandfather     No Known Problems Paternal Grandmother     No Known Problems Paternal Grandfather     Diabetes Neg Hx     No Known Problems Paternal Aunt         Social History     Socioeconomic History    Marital status:    Tobacco Use    Smoking status: Never    Smokeless tobacco: Never   Vaping Use    Vaping Use: Never used   Substance and Sexual Activity    Alcohol use: No    Drug use: No    Sexual activity: Yes     Partners: Male     Birth control/protection: None     Social Determinants of Health     Physical Activity: Sufficiently Active    Days of Exercise per Week: 5 days    Minutes of Exercise per Session: 40 min   Intimate Partner Violence: Not At Risk    Fear of Current or Ex-Partner: No    Emotionally Abused: No    Physically Abused: No    Sexually Abused: No        Allergies: Patient has no known allergies. Discharge Medication List as of 2/26/2023  8:52 AM        CONTINUE these medications which have NOT CHANGED    Details   lansoprazole (PREVACID) 30 MG delayed release capsule Take 1 capsule by mouth daily, Disp-30 capsule, R-3Normal      Prenatal MV & Min w/FA-DHA (ONE A DAY PRENATAL PO) Take by mouthHistorical Med              Vitals signs and nursing note reviewed. Patient Vitals for the past 4 hrs:   Temp Pulse Resp BP SpO2   02/26/23 0854 -- -- -- -- 98 %   02/26/23 0846 -- -- -- -- 97 %   02/26/23 0845 -- -- -- 100/65 98 %   02/26/23 0830 -- -- -- (!) 102/56 99 %   02/26/23 0815 -- -- -- 102/65 98 %   02/26/23 0801 -- -- -- -- 99 %   02/26/23 0800 -- -- -- 95/70 99 %   02/26/23 0759 -- -- -- 105/66 99 %   02/26/23 0632 98.5 °F (36.9 °C) 89 17 119/67 99 %          Physical Exam  Vitals and nursing note reviewed. Constitutional:       Appearance: Normal appearance. HENT:      Head: Normocephalic and atraumatic. Cardiovascular:      Rate and Rhythm: Normal rate and regular rhythm. Pulses: Normal pulses. Heart sounds: Normal heart sounds. Pulmonary:      Effort: Pulmonary effort is normal.      Breath sounds: Normal breath sounds. Abdominal:      General: Abdomen is flat. Bowel sounds are normal.      Palpations: Abdomen is soft. Tenderness: There is no abdominal tenderness. There is no right CVA tenderness or left CVA tenderness. Musculoskeletal:         General: No swelling or tenderness. Cervical back: Normal range of motion and neck supple. No tenderness. Skin:     General: Skin is warm and dry. Neurological:      General: No focal deficit present. Mental Status: She is alert and oriented to person, place, and time.    Psychiatric:         Behavior: Behavior normal.        Orders Placed This Encounter   Procedures    CBC with Auto Differential    Urinalysis with Reflex to Culture    HCG, Quantitative, Pregnancy    PELVIC EXAM SET UP       Procedures        Results Include:    Recent Results (from the past 24 hour(s))   Urinalysis with Reflex to Culture    Collection Time: 02/26/23  6:46 AM    Specimen: Urine   Result Value Ref Range    Color, UA YELLOW/STRAW      Appearance CLEAR      Specific Gravity, UA 1.015 1.001 - 1.023      pH, Urine 6.0 5.0 - 9.0      Protein, UA Negative NEG mg/dL    Glucose, UA Negative mg/dL    Ketones, Urine Negative NEG mg/dL    Bilirubin Urine Negative NEG      Blood, Urine LARGE (A) NEG      Urobilinogen, Urine 0.2 0.2 - 1.0 EU/dL    Nitrite, Urine Negative NEG      Leukocyte Esterase, Urine TRACE (A) NEG      Urine Culture if Indicated CULTURE NOT INDICATED BY UA RESULT      WBC, UA 0-4 U4 /hpf    RBC, UA 20-50 (A) U5 /hpf    BACTERIA, URINE Negative NEG /hpf    Epithelial Cells UA 5-10 (A) U5 /hpf    Casts 0-2 U2 /lpf    Mucus, UA 0 0 /lpf   HCG, Quantitative, Pregnancy    Collection Time: 02/26/23  6:46 AM   Result Value Ref Range    hCG Quant 47,646 (H) 0.0 - 6.0 MIU/ML   CBC with Auto Differential    Collection Time: 02/26/23  6:49 AM   Result Value Ref Range    WBC 8.6 4.3 - 11.1 K/uL RBC 3.71 (L) 4.05 - 5.2 M/uL    Hemoglobin 11.4 (L) 11.7 - 15.4 g/dL    Hematocrit 33.1 (L) 35.8 - 46.3 %    MCV 89.2 82.0 - 102.0 FL    MCH 30.7 26.1 - 32.9 PG    MCHC 34.4 31.4 - 35.0 g/dL    RDW 12.4 11.9 - 14.6 %    Platelets 888 882 - 286 K/uL    MPV 9.6 9.4 - 12.3 FL    nRBC 0.00 0.0 - 0.2 K/uL    Differential Type AUTOMATED      Seg Neutrophils 73 43 - 78 %    Lymphocytes 20 13 - 44 %    Monocytes 6 4.0 - 12.0 %    Eosinophils % 1 0.5 - 7.8 %    Basophils 0 0.0 - 2.0 %    Immature Granulocytes 0 0.0 - 5.0 %    Segs Absolute 6.2 1.7 - 8.2 K/UL    Absolute Lymph # 1.7 0.5 - 4.6 K/UL    Absolute Mono # 0.5 0.1 - 1.3 K/UL    Absolute Eos # 0.1 0.0 - 0.8 K/UL    Basophils Absolute 0.0 0.0 - 0.2 K/UL    Absolute Immature Granulocyte 0.0 0.0 - 0.5 K/UL          No orders to display                      ED Course as of 02/26/23 0939   Sun Feb 26, 2023   0846 Patient is resting comfortably in bed. She states that she had a little bit of vaginal bleeding recently but it has slowed down since this morning. Patient states that her blood type is a positive and she has never had to receive RhoGAM during pregnancy. She has declined the pelvic exam.  I explained the results and plan. She is comfortable with being discharged. [CW]      ED Course User Index  [CW] Mateo Hernandez MD       MDM  Number of Diagnoses or Management Options  Threatened miscarriage  Diagnosis management comments: Patient is 14 weeks pregnant and presents for vaginal bleeding that started this morning. Patient has had some mild spotting the past few days. Her vaginal bleeding has been similar to a period today. Patient has no pelvic pain or tenderness on exam.  Patient has already had a pelvic ultrasound with this pregnancy which shows an IUP so I do not suspect ectopic pregnancy. Patient's hemoglobin is 11.4 which is slightly lower than her baseline of 13 but not significant. Patient has stable vital signs.   Patient's blood type is A+ and she does not need RhoGAM.  Patient declined a pelvic exam.  She states that her bleeding decreased while in the ED. I do not suspect heavy hemorrhage and therefore I do not feel that she necessarily needs a pelvic exam.  Unfortunately, we cannot obtain an obstetric ultrasound in the ED because our radiologist will not read an ultrasound over 14 weeks of pregnancy. I recommend patient drink plenty fluids and go on pelvic rest.  Patient is to follow-up with her OB/GYN doctor tomorrow. Explanation: I have considered all emergent medical conditions that could have caused patient's presentation to the emergency department today. Based on their history, physical, and thorough evaluation I have excluded all emergent medical conditions that require any further evaluation today in the ED or hospital.  I feel that the patient is safe for discharge. The diagnosis and plan as well as the results of any testing (if done) and treatments (if done) today in the emergency department were communicated to the patient and/or their family/caregiver (if present). The patient/caregiver verbalized understanding and compliance with the treatment plan and reasons to return immediately to the emergency department. All questions were answered. ICD-10-CM    1. Threatened miscarriage  O20.0           DISPOSITION Decision To Discharge 02/26/2023 08:49:50 AM       Voice dictation software was used during the making of this note. This software is not perfect and grammatical and other typographical errors may be present. This note has not been completely proofread for errors.      Tara Soto MD  02/26/23 6822

## 2023-02-26 NOTE — ED NOTES
Pt refusing pelvic exam at this time stating she \"would rather not. \" MD informed. Mel Anaya [facility]  02/26/23 8294

## 2023-02-26 NOTE — ED TRIAGE NOTES
14wks pregnant w/ 6th pregnancy, dark red vaginal bleeding @ 0500, spotting 3 days ago, denies clots or pain.  Hx of miscarriage x2

## 2023-02-26 NOTE — ED NOTES
Report from SEDA Joy. Assumed care of pt at this time. Matthew Hernandez [place], [place]  02/26/23 6576

## 2023-02-28 PROBLEM — O20.0 THREATENED ABORTION: Status: ACTIVE | Noted: 2023-02-28

## 2023-02-28 PROBLEM — Z84.89 FAMILY HISTORY OF CYSTIC HYGROMA: Status: ACTIVE | Noted: 2023-02-28

## 2023-02-28 NOTE — PROGRESS NOTES
Work in problem visit for threatened AB, VB; ER FU    [id number] 14w1d    Patient presented to the ER on 2023 c/o VB. Initially started as mild spotting x3 days, then progressed to more moderate bleeding like a period. No clots. Denies any pain. Rh+  She declined a pelvic exam in the ER  Hemoglobin 11.4      Initial ultrasound here on 23 wnl.    There was noted to be a thin subchorionic hemorrhage anterior to the gestational sac measuring X 2cm     Genetics DECLINED       Today c/o some vb still when she wipes but no heavy vb  No pain, no vb        Repeat US today:    Growth appropriate   CL 3.63cm    Large anterior Rt lateral [address]  (18.3 x 14.8 x 1.3cm) covering the internal os  Referral back to MFM        Bleeding precautions given  Do not start ASA  Pelvic rest   Avoid any heavy lifting   MFM referral placed again --- encouraged pt to go  RTO in 2wks for repeat viability US                     OB hx:  G1 08 term   G2 6/8/10 term   G3 12 term    G4 3/7/15 term   G5 17  at 41w5d (9#6.4)  G6 2019 MAB at 10w6d w/ Cystic hygroma  (NIPT low risk); no D&C required   G7 2020  at 39w0d after elective IOL  (8#8.7)  G8 3/17/22 at 6w4d SAB  G9 current               AMA (42yo), Grand multip, hx prior preg w/ Cystic Hygroma:   S/p MFM referral --patient canceled due to high deductible  ASA 162mg -- pt never started; instructed to hold off on starting at this time given significant [address]  Genetics declined           Hx macrosomic infant:  Hgb A1C 5.3  Reports pelvis proven 10#  Denies any hx GDM; normal BMI   Reports has Never  Performed glucola in past w/ previous pregnancies   ---last pregnancy stated they just had her come in fasting then michael her blood an hour after eating a random meal  States a lot of sugar makes her sick and would prefer to not perform the glucola at 28wks  Declined glucola w/ last pregnancy and checked FSBG   Watch growth

## 2023-03-01 ENCOUNTER — ROUTINE PRENATAL (OUTPATIENT)
Dept: OBGYN CLINIC | Age: 38
End: 2023-03-01

## 2023-03-01 VITALS — WEIGHT: 164 LBS | DIASTOLIC BLOOD PRESSURE: 62 MMHG | BODY MASS INDEX: 27.29 KG/M2 | SYSTOLIC BLOOD PRESSURE: 116 MMHG

## 2023-03-01 DIAGNOSIS — O46.8X2 SUBCHORIONIC HEMATOMA IN SECOND TRIMESTER, SINGLE OR UNSPECIFIED FETUS: ICD-10-CM

## 2023-03-01 DIAGNOSIS — O09.41 SUPERVISION OF HIGH-RISK PREGNANCY WITH GRAND MULTIPARITY IN FIRST TRIMESTER: ICD-10-CM

## 2023-03-01 DIAGNOSIS — O20.9 VAGINAL BLEEDING AFFECTING EARLY PREGNANCY: Primary | ICD-10-CM

## 2023-03-01 DIAGNOSIS — H11.30 SUBCONJUNCTIVAL HEMORRHAGE, UNSPECIFIED LATERALITY: ICD-10-CM

## 2023-03-01 DIAGNOSIS — O20.0 THREATENED ABORTION: ICD-10-CM

## 2023-03-01 DIAGNOSIS — O09.299 HISTORY OF MACROSOMIA IN INFANT IN PRIOR PREGNANCY, CURRENTLY PREGNANT: ICD-10-CM

## 2023-03-01 DIAGNOSIS — O41.8X20 SUBCHORIONIC HEMATOMA IN SECOND TRIMESTER, SINGLE OR UNSPECIFIED FETUS: ICD-10-CM

## 2023-03-01 DIAGNOSIS — O09.522 MULTIGRAVIDA OF ADVANCED MATERNAL AGE IN SECOND TRIMESTER: ICD-10-CM

## 2023-03-01 DIAGNOSIS — Z86.2 HISTORY OF ANEMIA: ICD-10-CM

## 2023-03-01 DIAGNOSIS — Z84.89 FAMILY HISTORY OF CYSTIC HYGROMA: ICD-10-CM

## 2023-03-01 SDOH — ECONOMIC STABILITY: INCOME INSECURITY: HOW HARD IS IT FOR YOU TO PAY FOR THE VERY BASICS LIKE FOOD, HOUSING, MEDICAL CARE, AND HEATING?: NOT HARD AT ALL

## 2023-03-01 SDOH — ECONOMIC STABILITY: FOOD INSECURITY: WITHIN THE PAST 12 MONTHS, THE FOOD YOU BOUGHT JUST DIDN'T LAST AND YOU DIDN'T HAVE MONEY TO GET MORE.: NEVER TRUE

## 2023-03-01 SDOH — ECONOMIC STABILITY: FOOD INSECURITY: WITHIN THE PAST 12 MONTHS, YOU WORRIED THAT YOUR FOOD WOULD RUN OUT BEFORE YOU GOT MONEY TO BUY MORE.: NEVER TRUE

## 2023-03-01 SDOH — ECONOMIC STABILITY: HOUSING INSECURITY
IN THE LAST 12 MONTHS, WAS THERE A TIME WHEN YOU DID NOT HAVE A STEADY PLACE TO SLEEP OR SLEPT IN A SHELTER (INCLUDING NOW)?: NO

## 2023-03-14 ENCOUNTER — ROUTINE PRENATAL (OUTPATIENT)
Dept: OBGYN CLINIC | Age: 38
End: 2023-03-14
Payer: COMMERCIAL

## 2023-03-14 VITALS — DIASTOLIC BLOOD PRESSURE: 69 MMHG | SYSTOLIC BLOOD PRESSURE: 122 MMHG | BODY MASS INDEX: 27.46 KG/M2 | WEIGHT: 165 LBS

## 2023-03-14 DIAGNOSIS — O41.8X20 SUBCHORIONIC HEMATOMA IN SECOND TRIMESTER, SINGLE OR UNSPECIFIED FETUS: Primary | ICD-10-CM

## 2023-03-14 DIAGNOSIS — Z13.0 SCREENING, ANEMIA, DEFICIENCY, IRON: ICD-10-CM

## 2023-03-14 DIAGNOSIS — O09.41 SUPERVISION OF HIGH-RISK PREGNANCY WITH GRAND MULTIPARITY IN FIRST TRIMESTER: ICD-10-CM

## 2023-03-14 DIAGNOSIS — O46.8X2 SUBCHORIONIC HEMATOMA IN SECOND TRIMESTER, SINGLE OR UNSPECIFIED FETUS: Primary | ICD-10-CM

## 2023-03-14 DIAGNOSIS — O20.9 VAGINAL BLEEDING AFFECTING EARLY PREGNANCY: ICD-10-CM

## 2023-03-14 DIAGNOSIS — Z86.2 HISTORY OF ANEMIA: ICD-10-CM

## 2023-03-14 DIAGNOSIS — O09.522 MULTIGRAVIDA OF ADVANCED MATERNAL AGE IN SECOND TRIMESTER: ICD-10-CM

## 2023-03-14 DIAGNOSIS — O09.521 MULTIGRAVIDA OF ADVANCED MATERNAL AGE IN FIRST TRIMESTER: ICD-10-CM

## 2023-03-14 LAB
ERYTHROCYTE [DISTWIDTH] IN BLOOD BY AUTOMATED COUNT: 13.1 % (ref 11.9–14.6)
HCT VFR BLD AUTO: 33.7 % (ref 35.8–46.3)
HGB BLD-MCNC: 11.5 G/DL (ref 11.7–15.4)
MCH RBC QN AUTO: 31.9 PG (ref 26.1–32.9)
MCHC RBC AUTO-ENTMCNC: 34.1 G/DL (ref 31.4–35)
MCV RBC AUTO: 93.6 FL (ref 82–102)
NRBC # BLD: 0 K/UL (ref 0–0.2)
PLATELET # BLD AUTO: 278 K/UL (ref 150–450)
PMV BLD AUTO: 11.1 FL (ref 9.4–12.3)
RBC # BLD AUTO: 3.6 M/UL (ref 4.05–5.2)
WBC # BLD AUTO: 10.4 K/UL (ref 4.3–11.1)

## 2023-03-14 PROCEDURE — 99902 PR PRENATAL VISIT: CPT | Performed by: OBSTETRICS & GYNECOLOGY

## 2023-03-14 PROCEDURE — 76815 OB US LIMITED FETUS(S): CPT | Performed by: OBSTETRICS & GYNECOLOGY

## 2023-03-14 NOTE — PROGRESS NOTES
Joyce Colunga. [id number] 16w0d  Denies LOF; reports continued off and on VB since last encounter      Known [facility]:  See last note for complete details  Rh+  Off and on VB --nothing heavy  States there is \"more at night\" with occasional small clots  VB is not daily   Has never soaked through a pad  No pain   No fevers  Not taking ASA    Hgb 11.4 (23)  Repeat CBC today __       US (3/1/23):     Growth appropriate   CL 3.63cm    Large anterior Rt lateral [facility]  (18.3 x 14.8 x 1.3cm) covering the internal os  Referral back to [facility] [address] today:  [facility] 15.6 x 8.7 x 1.5cm     Good fm     (Decreased in size from initial)      Was referred to [facility] x2 for AMA and then again for [facility] -- pt states she decided again to decline referral at this time           Bleeding precautions again given  Instructed to not start ASA  Pelvic rest   Avoid any heavy lifting   RTO for RAMOS US  --- sooner if any episodes of  heavy bleeding                  OB hx:  G1 08 term   G2 6/8/10 term   G3 12 term    G4 3/7/15 term   G5 17  at 41w5d (9#6.4)  G6 2019 MAB at 10w6d w/ Cystic hygroma  (NIPT low risk); no D&C required   G7 2020  at 39w0d after elective IOL  (8#8.7)  G8 3/17/22 at 6w4d SAB  G9 current               AMA (44yo), Grand multip, hx prior preg w/ Cystic Hygroma:   S/p MFM referral --patient canceled due to high deductible  ASA 162mg -- pt never started; instructed to hold off on starting at this time given significant [facility] as above    Genetics declined            Hx macrosomic infant:  Hgb A1C 5.3  Reports pelvis proven 10#  Denies any hx GDM; normal BMI   Reports has Never  Performed glucola in past w/ previous pregnancies   ---last pregnancy stated they just had her come in fasting then michael her blood an hour after eating a random meal  States a lot of sugar makes her sick and would prefer to not perform the glucola at 28wks  Declined glucola w/ last pregnancy and checked FSBG   Watch growth

## 2023-05-08 ENCOUNTER — ROUTINE PRENATAL (OUTPATIENT)
Dept: OBGYN CLINIC | Age: 38
End: 2023-05-08

## 2023-05-08 VITALS — DIASTOLIC BLOOD PRESSURE: 70 MMHG | SYSTOLIC BLOOD PRESSURE: 120 MMHG | WEIGHT: 178 LBS | BODY MASS INDEX: 29.62 KG/M2

## 2023-05-08 DIAGNOSIS — Z64.1 GRAND MULTIPARA: ICD-10-CM

## 2023-05-08 DIAGNOSIS — O09.41 SUPERVISION OF HIGH-RISK PREGNANCY WITH GRAND MULTIPARITY IN FIRST TRIMESTER: ICD-10-CM

## 2023-05-08 DIAGNOSIS — O09.299 HISTORY OF MACROSOMIA IN INFANT IN PRIOR PREGNANCY, CURRENTLY PREGNANT: Primary | ICD-10-CM

## 2023-05-08 DIAGNOSIS — O41.8X20 SUBCHORIONIC HEMATOMA IN SECOND TRIMESTER, SINGLE OR UNSPECIFIED FETUS: ICD-10-CM

## 2023-05-08 DIAGNOSIS — O09.521 MULTIGRAVIDA OF ADVANCED MATERNAL AGE IN FIRST TRIMESTER: ICD-10-CM

## 2023-05-08 DIAGNOSIS — Z84.89 FAMILY HISTORY OF CYSTIC HYGROMA: ICD-10-CM

## 2023-05-08 DIAGNOSIS — Z86.2 HISTORY OF ANEMIA: ICD-10-CM

## 2023-05-08 DIAGNOSIS — Z36.2 ENCOUNTER FOR FOLLOW-UP ULTRASOUND OF FETAL ANATOMY: ICD-10-CM

## 2023-05-08 DIAGNOSIS — O46.8X2 SUBCHORIONIC HEMATOMA IN SECOND TRIMESTER, SINGLE OR UNSPECIFIED FETUS: ICD-10-CM

## 2023-05-08 DIAGNOSIS — O09.522 AMA (ADVANCED MATERNAL AGE) MULTIGRAVIDA 35+, SECOND TRIMESTER: ICD-10-CM

## 2023-05-08 NOTE — PROGRESS NOTES
BRISSA [id number] 23w6d  Denies LOF, VB, Ctxs. Good FM.        RAMOS US at 20wks:  AC 58%, Suboptimal views of spine    Male      US today:  GP 71%, AC 97%   CL 4.8cm   [address] measures smaller today (4.2 x0.9cm)  Anterior lateral   Spine views wnl  RAMOS  completed       No new VB       Wants to do  fsbg and bring log to [place] (has glucometer--give Rx for testing strips and lancets)        Known Large [address]:  See prior notes for complete details  Rh+  Not taking ASA    Hgb 11.5 (3/14/23)       US at 20wks:  [address] 6.9 x 6.7 x 0.9cm  (Significant decreased in size from initial)    US tdoay: [address] measures smaller today (4.2 x0.9cm)      Was referred to MFM x2 for AMA and then again for large [address] -- pt states she decided again to decline referral at this time             OB hx:  G1 08 term   G2 6/8/10 term   G3 12 term    G4 3/7/15 term   G5 17  at 41w5d (9#6.4)  G6 2019 MAB at 10w6d w/ Cystic hygroma  (NIPT low risk); no D&C required   G7 2020  at 39w0d after elective IOL  (8#8.7)  G8 3/17/22 at 6w4d SAB  G9 current             AMA (42yo), Grand multip, hx prior preg w/ Cystic Hygroma:   S/p MFM referral --patient canceled due to high deductible  ASA 162mg -- pt never started; instructed to hold off on starting at this time given significant [address] as above    Genetics and MFM declined           Hx macrosomic infant:  Hgb A1C 5.3  Reports pelvis proven 10#  Denies any hx GDM; normal BMI   Reports has Never  Performed glucola in past w/ previous pregnancies   ---last pregnancy stated they just had her come in fasting then michael her blood an hour after eating a random meal  States a lot of sugar makes her sick and would prefer to not perform the glucola at 28wks  Declined glucola w/ last pregnancy and checked FSBG   Watch growth

## 2023-05-22 ENCOUNTER — PATIENT MESSAGE (OUTPATIENT)
Dept: OBGYN CLINIC | Age: 38
End: 2023-05-22

## 2023-05-22 DIAGNOSIS — Z13.1 SCREENING FOR DIABETES MELLITUS (DM): Primary | ICD-10-CM

## 2023-05-22 NOTE — TELEPHONE ENCOUNTER
From: Juan Diego Hand  To: Dr. Bridger Cohen: 5/22/2023 9:52 AM EDT  Subject: Glucose testing     I was wondering do I do glucose testing for a week or two weeks? Also Patrick Cassidy was supposed to put me in prescription to do the blood check. I have the machine but I need the test strips and gauge.

## 2023-06-05 ENCOUNTER — ROUTINE PRENATAL (OUTPATIENT)
Dept: OBGYN CLINIC | Age: 38
End: 2023-06-05

## 2023-06-05 ENCOUNTER — HOSPITAL ENCOUNTER (OUTPATIENT)
Dept: LAB | Age: 38
Setting detail: SPECIMEN
Discharge: HOME | End: 2023-06-08

## 2023-06-05 VITALS — DIASTOLIC BLOOD PRESSURE: 76 MMHG | SYSTOLIC BLOOD PRESSURE: 124 MMHG | BODY MASS INDEX: 29.62 KG/M2 | WEIGHT: 178 LBS

## 2023-06-05 DIAGNOSIS — Z86.2 HISTORY OF ANEMIA: ICD-10-CM

## 2023-06-05 DIAGNOSIS — O09.522 AMA (ADVANCED MATERNAL AGE) MULTIGRAVIDA 35+, SECOND TRIMESTER: ICD-10-CM

## 2023-06-05 DIAGNOSIS — O41.8X20 SUBCHORIONIC HEMATOMA IN SECOND TRIMESTER, SINGLE OR UNSPECIFIED FETUS: ICD-10-CM

## 2023-06-05 DIAGNOSIS — Z71.85 VACCINE COUNSELING: ICD-10-CM

## 2023-06-05 DIAGNOSIS — O09.41 SUPERVISION OF HIGH-RISK PREGNANCY WITH GRAND MULTIPARITY IN FIRST TRIMESTER: Primary | ICD-10-CM

## 2023-06-05 DIAGNOSIS — O09.521 MULTIGRAVIDA OF ADVANCED MATERNAL AGE IN FIRST TRIMESTER: ICD-10-CM

## 2023-06-05 DIAGNOSIS — O09.299 HISTORY OF MACROSOMIA IN INFANT IN PRIOR PREGNANCY, CURRENTLY PREGNANT: ICD-10-CM

## 2023-06-05 DIAGNOSIS — O46.8X2 SUBCHORIONIC HEMATOMA IN SECOND TRIMESTER, SINGLE OR UNSPECIFIED FETUS: ICD-10-CM

## 2023-06-05 DIAGNOSIS — Z13.0 SCREENING, ANEMIA, DEFICIENCY, IRON: ICD-10-CM

## 2023-06-05 DIAGNOSIS — Z84.89 FAMILY HISTORY OF CYSTIC HYGROMA: ICD-10-CM

## 2023-06-05 DIAGNOSIS — O09.523 MULTIGRAVIDA OF ADVANCED MATERNAL AGE IN THIRD TRIMESTER: ICD-10-CM

## 2023-06-05 DIAGNOSIS — Z13.1 SCREENING FOR DIABETES MELLITUS (DM): ICD-10-CM

## 2023-06-05 LAB
EST. AVERAGE GLUCOSE BLD GHB EST-MCNC: 103 MG/DL
HBA1C MFR BLD: 5.2 % (ref 4.8–5.6)

## 2023-06-05 PROCEDURE — 83036 HEMOGLOBIN GLYCOSYLATED A1C: CPT

## 2023-06-05 ASSESSMENT — PATIENT HEALTH QUESTIONNAIRE - PHQ9
SUM OF ALL RESPONSES TO PHQ QUESTIONS 1-9: 0
SUM OF ALL RESPONSES TO PHQ QUESTIONS 1-9: 0
1. LITTLE INTEREST OR PLEASURE IN DOING THINGS: 0
SUM OF ALL RESPONSES TO PHQ QUESTIONS 1-9: 0
SUM OF ALL RESPONSES TO PHQ9 QUESTIONS 1 & 2: 0
SUM OF ALL RESPONSES TO PHQ QUESTIONS 1-9: 0
2. FEELING DOWN, DEPRESSED OR HOPELESS: 0

## 2023-06-05 NOTE — PROGRESS NOTES
STEFFANIE. [id number]. 27w6d   Denies LOF, VB, Ctxs. Good FM.       CBC today; glucola declined--see below    Rh pos   Tdap counseling       OB hx:  G1 08 term   G2 6/8/10 term   G3 12 term    G4 3/7/15 term   G5 17  at 41w5d (9#6.4)  G6 2019 MAB at 10w6d w/ Cystic hygroma  (NIPT low risk); no D&C required   G7 2020  at 39w0d after elective IOL  (8#8.7)  G8 3/17/22 at 6w4d SAB  G9 current          AMA (44yo), Grand multip, hx prior preg w/ Cystic Hygroma:   S/p MFM referral --patient canceled due to high deductible  ASA 162mg -- pt never started; instructed to hold off on starting at this time given significant [address] as above    Genetics and MFM declined         Hx macrosomic infant:  Hgb A1C 5.3  Reports pelvis proven 10#  Denies any hx GDM; normal BMI   Reports has Never  Performed glucola in past w/ previous pregnancies   Watch growth      Declines glucola ---wants to check fsbgs:  Log reviewed:   Fasting: all wnl  1hr: 1 elevated at 127     Will get hgA1C agin today and let her stop fsbg for now  Continue to monitor carbs    Watch growth closely       US (23) at 23w6d:  GP 71%, AC 97%   CL 4.8cm   [address] measures smaller today (4.2 x0.9cm)  Anterior lateral   Spine views wnl  RAMOS  completed     Repeat growth US at 32wks __             Known Large [address]:  See prior notes for complete details  Rh+  Not taking ASA    Hgb 11.5 (3/14/23)       US at 20wks:  [address] 6.9 x 6.7 x 0.9cm  (Significant decreased in size from initial)  US 23w6d[de-identified] [address] measures smaller today (4.2 x0.9cm)    Was referred to M x2 for AMA and then again for large [address] -- pt states she decided again to decline referral at this time

## 2023-06-19 ENCOUNTER — ROUTINE PRENATAL (OUTPATIENT)
Dept: OBGYN CLINIC | Age: 38
End: 2023-06-19

## 2023-06-19 ENCOUNTER — HOSPITAL ENCOUNTER (OUTPATIENT)
Dept: LAB | Age: 38
Setting detail: SPECIMEN
Discharge: HOME | End: 2023-06-22

## 2023-06-19 VITALS — WEIGHT: 180 LBS | BODY MASS INDEX: 29.95 KG/M2 | DIASTOLIC BLOOD PRESSURE: 54 MMHG | SYSTOLIC BLOOD PRESSURE: 98 MMHG

## 2023-06-19 DIAGNOSIS — Z86.2 HISTORY OF ANEMIA: ICD-10-CM

## 2023-06-19 DIAGNOSIS — Z3A.29 29 WEEKS GESTATION OF PREGNANCY: ICD-10-CM

## 2023-06-19 DIAGNOSIS — O09.299 HISTORY OF MACROSOMIA IN INFANT IN PRIOR PREGNANCY, CURRENTLY PREGNANT: ICD-10-CM

## 2023-06-19 DIAGNOSIS — O09.43 SUPERVISION OF HIGH-RISK PREGNANCY WITH GRAND MULTIPARITY IN THIRD TRIMESTER: Primary | ICD-10-CM

## 2023-06-19 DIAGNOSIS — O09.523 MULTIGRAVIDA OF ADVANCED MATERNAL AGE IN THIRD TRIMESTER: ICD-10-CM

## 2023-06-19 LAB
ERYTHROCYTE [DISTWIDTH] IN BLOOD BY AUTOMATED COUNT: 12.4 % (ref 11.9–14.6)
HCT VFR BLD AUTO: 32.9 % (ref 35.8–46.3)
HGB BLD-MCNC: 10.9 G/DL (ref 11.7–15.4)
MCH RBC QN AUTO: 31.6 PG (ref 26.1–32.9)
MCHC RBC AUTO-ENTMCNC: 33.1 G/DL (ref 31.4–35)
MCV RBC AUTO: 95.4 FL (ref 82–102)
NRBC # BLD: 0 K/UL (ref 0–0.2)
PLATELET # BLD AUTO: 241 K/UL (ref 150–450)
PMV BLD AUTO: 10.6 FL (ref 9.4–12.3)
RBC # BLD AUTO: 3.45 M/UL (ref 4.05–5.2)
WBC # BLD AUTO: 10 K/UL (ref 4.3–11.1)

## 2023-06-19 PROCEDURE — 85027 COMPLETE CBC AUTOMATED: CPT

## 2023-06-19 NOTE — PATIENT INSTRUCTIONS
PTL/labor precautions, [address], and pregnancy warning signs reviewed. Pt advised to call the office at [phone] or go straight to Labor and Delivery at [facility] with any of the following concerns vaginal bleeding, leaking of fluid, denise regularly Q 5-7 minutes for over an hour or not feeling the baby move.      Kick counts and pre-term labor precautions reviewed

## 2023-06-20 DIAGNOSIS — Z86.2 HISTORY OF ANEMIA: ICD-10-CM

## 2023-06-20 DIAGNOSIS — O99.013 ANEMIA AFFECTING PREGNANCY IN THIRD TRIMESTER: Primary | ICD-10-CM

## 2023-06-20 RX ORDER — FERROUS SULFATE 325(65) MG
325 TABLET ORAL DAILY
Qty: 60 TABLET | Refills: 2 | Status: SHIPPED | OUTPATIENT
Start: 2023-06-20

## 2023-06-20 RX ORDER — ASCORBIC ACID 500 MG
500 TABLET ORAL DAILY
Qty: 30 TABLET | Refills: 3 | Status: SHIPPED | OUTPATIENT
Start: 2023-06-20

## 2023-06-20 NOTE — PROGRESS NOTES
Hgb: 10.9. Rx Fe/Vit C sent into pharmacy to take daily in addition PNV. Patient instructed to take Fe/Vit C together separate from PNV daily and on empty stomach. Will plan to recheck Hgb in a few weeks.

## 2023-07-06 ENCOUNTER — ROUTINE PRENATAL (OUTPATIENT)
Dept: OBGYN CLINIC | Age: 38
End: 2023-07-06

## 2023-07-06 VITALS — SYSTOLIC BLOOD PRESSURE: 100 MMHG | BODY MASS INDEX: 30.62 KG/M2 | DIASTOLIC BLOOD PRESSURE: 60 MMHG | WEIGHT: 184 LBS

## 2023-07-06 DIAGNOSIS — O09.43 SUPERVISION OF HIGH-RISK PREGNANCY WITH GRAND MULTIPARITY IN THIRD TRIMESTER: Primary | ICD-10-CM

## 2023-07-06 DIAGNOSIS — Z86.2 HISTORY OF ANEMIA: ICD-10-CM

## 2023-07-06 DIAGNOSIS — O09.523 MULTIGRAVIDA OF ADVANCED MATERNAL AGE IN THIRD TRIMESTER: ICD-10-CM

## 2023-07-06 DIAGNOSIS — O09.41 SUPERVISION OF HIGH-RISK PREGNANCY WITH GRAND MULTIPARITY IN FIRST TRIMESTER: ICD-10-CM

## 2023-07-06 DIAGNOSIS — O09.299 HISTORY OF MACROSOMIA IN INFANT IN PRIOR PREGNANCY, CURRENTLY PREGNANT: ICD-10-CM

## 2023-07-06 DIAGNOSIS — O09.521 MULTIGRAVIDA OF ADVANCED MATERNAL AGE IN FIRST TRIMESTER: ICD-10-CM

## 2023-07-06 RX ORDER — GLUCOSAMINE HCL/CHONDROITIN SU 500-400 MG
CAPSULE ORAL
Qty: 90 STRIP | Refills: 2 | Status: SHIPPED | OUTPATIENT
Start: 2023-07-06

## 2023-07-06 RX ORDER — LANCETS 30 GAUGE
1 EACH MISCELLANEOUS DAILY
Qty: 100 EACH | Refills: 3 | Status: SHIPPED | OUTPATIENT
Start: 2023-07-06

## 2023-07-06 NOTE — PROGRESS NOTES
STEFFANIE. [id number] 32w2d  Denies LOF, VB, Ctxs. Good FM. glucola declined--see below    Tdap reminder         OB hx:  G1 08 term   G2 6/8/10 term   G3 12 term    G4 3/7/15 term   G5 17  at 41w5d (9#6.4)  G6 2019 MAB at 10w6d w/ Cystic hygroma  (NIPT low risk); no D&C required   G7 2020  at 39w0d after elective IOL  (8#8.7)  G8 3/17/22 at 6w4d SAB  G9 current          AMA (42yo), Grand multip, hx prior preg w/ Cystic Hygroma:   S/p MFM referral --patient canceled due to high deductible  ASA 162mg -- pt never started; instructed to hold off on starting at this time given significant [address] as above    Genetics and MFM declined         Hx macrosomic infant:  Hgb A1C 5.3  Reports pelvis proven 10#  Denies any hx GDM; normal BMI   Reports has Never  Performed glucola in past w/ previous pregnancies   Watch growth      Declines glucola ---wants to check fsbgs:  Log reviewed and all wnl except 1.     Was told she could stop checking fsbg but continue to monitor carbs    Watch growth closely   Last hgb A1C wnl        US (23) at 23w6d:  GP 71%, AC 97%   CL 4.8cm   [address] measures smaller today (4.2 x0.9cm)  Anterior lateral   Spine views wnl  RAMOS  completed     Repeat growth US tdoay:  GP 74%, 97%, JEREMIAH 20  Vertex     Repeat gorwht US in 4wks

## 2023-07-10 ENCOUNTER — HOSPITAL ENCOUNTER (INPATIENT)
Age: 38
LOS: 3 days | Discharge: HOME | End: 2023-07-14
Attending: OBSTETRICS & GYNECOLOGY | Admitting: OBSTETRICS & GYNECOLOGY
Payer: COMMERCIAL

## 2023-07-10 PROCEDURE — 4A1HXCZ MONITORING OF PRODUCTS OF CONCEPTION, CARDIAC RATE, EXTERNAL APPROACH: ICD-10-PCS | Performed by: OBSTETRICS & GYNECOLOGY

## 2023-07-11 ENCOUNTER — ANESTHESIA (OUTPATIENT)
Dept: OPERATING ROOM | Age: 38
End: 2023-07-11
Payer: COMMERCIAL

## 2023-07-11 ENCOUNTER — ANESTHESIA EVENT (OUTPATIENT)
Dept: OPERATING ROOM | Age: 38
End: 2023-07-11
Payer: COMMERCIAL

## 2023-07-11 PROBLEM — O42.919 PRETERM PREMATURE RUPTURE OF MEMBRANES (PPROM) WITH UNKNOWN ONSET OF LABOR: Status: ACTIVE | Noted: 2023-07-11

## 2023-07-11 PROBLEM — O20.0 THREATENED ABORTION: Status: RESOLVED | Noted: 2023-02-28 | Resolved: 2023-07-11

## 2023-07-11 LAB
ABO + RH BLD: NORMAL
BASE DEFICIT BLD-SCNC: 3.4 MMOL/L
BASE DEFICIT BLD-SCNC: 4.3 MMOL/L
BASOPHILS # BLD: 0 K/UL (ref 0–0.2)
BASOPHILS NFR BLD: 0 % (ref 0–2)
BLOOD GROUP ANTIBODIES SERPL: NORMAL
DIFFERENTIAL METHOD BLD: ABNORMAL
EOSINOPHIL # BLD: 0 K/UL (ref 0–0.8)
EOSINOPHIL NFR BLD: 0 % (ref 0.5–7.8)
ERYTHROCYTE [DISTWIDTH] IN BLOOD BY AUTOMATED COUNT: 13 % (ref 11.9–14.6)
GLUCOSE BLD STRIP.AUTO-MCNC: 102 MG/DL (ref 65–100)
HCO3 BLD-SCNC: 24 MMOL/L (ref 22–26)
HCO3 BLDV-SCNC: 22.2 MMOL/L (ref 23–28)
HCT VFR BLD AUTO: 33.4 % (ref 35.8–46.3)
HGB BLD-MCNC: 11 G/DL (ref 11.7–15.4)
IMM GRANULOCYTES # BLD AUTO: 0.1 K/UL (ref 0–0.5)
IMM GRANULOCYTES NFR BLD AUTO: 1 % (ref 0–5)
LYMPHOCYTES # BLD: 1.4 K/UL (ref 0.5–4.6)
LYMPHOCYTES NFR BLD: 11 % (ref 13–44)
MCH RBC QN AUTO: 30.6 PG (ref 26.1–32.9)
MCHC RBC AUTO-ENTMCNC: 32.9 G/DL (ref 31.4–35)
MCV RBC AUTO: 93 FL (ref 82–102)
MONOCYTES # BLD: 0.8 K/UL (ref 0.1–1.3)
MONOCYTES NFR BLD: 6 % (ref 4–12)
NEUTS SEG # BLD: 10.2 K/UL (ref 1.7–8.2)
NEUTS SEG NFR BLD: 82 % (ref 43–78)
NRBC # BLD: 0 K/UL (ref 0–0.2)
PCO2 BLDCO: 41 MMHG (ref 32–68)
PCO2 BLDCO: 56 MMHG (ref 32–68)
PH BLDCO: 7.24 (ref 7.15–7.38)
PH BLDCO: 7.34 (ref 7.15–7.38)
PLATELET # BLD AUTO: 182 K/UL (ref 150–450)
PLATELET COMMENT: ADEQUATE
PMV BLD AUTO: 11.5 FL (ref 9.4–12.3)
PO2 BLDCO: 23 MMHG
PO2 BLDCO: 8 MMHG
RBC # BLD AUTO: 3.59 M/UL (ref 4.05–5.2)
RBC MORPH BLD: ABNORMAL
SAO2 % BLD: 5.5 % (ref 95–98)
SAO2 % BLDV: 37.1 % (ref 65–88)
SERVICE CMNT-IMP: ABNORMAL
SPECIMEN EXP DATE BLD: NORMAL
SPECIMEN TYPE: ABNORMAL
SPECIMEN TYPE: ABNORMAL
WBC # BLD AUTO: 12.5 K/UL (ref 4.3–11.1)
WBC MORPH BLD: ABNORMAL

## 2023-07-11 PROCEDURE — 6360000002 HC RX W HCPCS: Performed by: OBSTETRICS & GYNECOLOGY

## 2023-07-11 PROCEDURE — 2580000003 HC RX 258: Performed by: OBSTETRICS & GYNECOLOGY

## 2023-07-11 PROCEDURE — 2500000003 HC RX 250 WO HCPCS: Performed by: NURSE ANESTHETIST, CERTIFIED REGISTERED

## 2023-07-11 PROCEDURE — 82962 GLUCOSE BLOOD TEST: CPT

## 2023-07-11 PROCEDURE — 86900 BLOOD TYPING SEROLOGIC ABO: CPT

## 2023-07-11 PROCEDURE — 3700000025 EPIDURAL BLOCK: Performed by: ANESTHESIOLOGY

## 2023-07-11 PROCEDURE — 2500000003 HC RX 250 WO HCPCS: Performed by: ANESTHESIOLOGY

## 2023-07-11 PROCEDURE — 2580000003 HC RX 258: Performed by: NURSE ANESTHETIST, CERTIFIED REGISTERED

## 2023-07-11 PROCEDURE — 6360000002 HC RX W HCPCS: Performed by: NURSE ANESTHETIST, CERTIFIED REGISTERED

## 2023-07-11 PROCEDURE — 7100000000 HC PACU RECOVERY - FIRST 15 MIN: Performed by: OBSTETRICS & GYNECOLOGY

## 2023-07-11 PROCEDURE — 85025 COMPLETE CBC W/AUTO DIFF WBC: CPT

## 2023-07-11 PROCEDURE — 82803 BLOOD GASES ANY COMBINATION: CPT

## 2023-07-11 PROCEDURE — 3609079900 HC CESAREAN SECTION: Performed by: OBSTETRICS & GYNECOLOGY

## 2023-07-11 PROCEDURE — 6370000000 HC RX 637 (ALT 250 FOR IP): Performed by: ANESTHESIOLOGY

## 2023-07-11 PROCEDURE — 6360000002 HC RX W HCPCS: Performed by: ANESTHESIOLOGY

## 2023-07-11 PROCEDURE — 6360000002 HC RX W HCPCS

## 2023-07-11 PROCEDURE — 51701 INSERT BLADDER CATHETER: CPT

## 2023-07-11 PROCEDURE — 86901 BLOOD TYPING SEROLOGIC RH(D): CPT

## 2023-07-11 PROCEDURE — 1100000000 HC RM PRIVATE

## 2023-07-11 PROCEDURE — 2709999900 HC NON-CHARGEABLE SUPPLY: Performed by: OBSTETRICS & GYNECOLOGY

## 2023-07-11 PROCEDURE — 3700000001 HC ADD 15 MINUTES (ANESTHESIA): Performed by: OBSTETRICS & GYNECOLOGY

## 2023-07-11 PROCEDURE — 7100000001 HC PACU RECOVERY - ADDTL 15 MIN: Performed by: OBSTETRICS & GYNECOLOGY

## 2023-07-11 PROCEDURE — 87081 CULTURE SCREEN ONLY: CPT

## 2023-07-11 PROCEDURE — 36600 WITHDRAWAL OF ARTERIAL BLOOD: CPT

## 2023-07-11 PROCEDURE — 2580000003 HC RX 258: Performed by: ANESTHESIOLOGY

## 2023-07-11 PROCEDURE — 86850 RBC ANTIBODY SCREEN: CPT

## 2023-07-11 PROCEDURE — 2580000003 HC RX 258

## 2023-07-11 PROCEDURE — 3700000000 HC ANESTHESIA ATTENDED CARE: Performed by: OBSTETRICS & GYNECOLOGY

## 2023-07-11 PROCEDURE — 99285 EMERGENCY DEPT VISIT HI MDM: CPT

## 2023-07-11 RX ORDER — KETOROLAC TROMETHAMINE 30 MG/ML
INJECTION, SOLUTION INTRAMUSCULAR; INTRAVENOUS PRN
Status: DISCONTINUED | OUTPATIENT
Start: 2023-07-11 | End: 2023-07-11 | Stop reason: SDUPTHER

## 2023-07-11 RX ORDER — SODIUM CHLORIDE 0.9 % (FLUSH) 0.9 %
5-40 SYRINGE (ML) INJECTION EVERY 12 HOURS SCHEDULED
Status: DISCONTINUED | OUTPATIENT
Start: 2023-07-11 | End: 2023-07-14 | Stop reason: HOSPADM

## 2023-07-11 RX ORDER — AZITHROMYCIN 250 MG/1
500 TABLET, FILM COATED ORAL DAILY
Status: DISCONTINUED | OUTPATIENT
Start: 2023-07-14 | End: 2023-07-12

## 2023-07-11 RX ORDER — SODIUM CHLORIDE, SODIUM LACTATE, POTASSIUM CHLORIDE, CALCIUM CHLORIDE 600; 310; 30; 20 MG/100ML; MG/100ML; MG/100ML; MG/100ML
INJECTION, SOLUTION INTRAVENOUS CONTINUOUS
Status: DISCONTINUED | OUTPATIENT
Start: 2023-07-11 | End: 2023-07-11

## 2023-07-11 RX ORDER — METOCLOPRAMIDE HYDROCHLORIDE 5 MG/ML
10 INJECTION INTRAMUSCULAR; INTRAVENOUS ONCE
Status: COMPLETED | OUTPATIENT
Start: 2023-07-11 | End: 2023-07-11

## 2023-07-11 RX ORDER — SODIUM CHLORIDE, SODIUM LACTATE, POTASSIUM CHLORIDE, CALCIUM CHLORIDE 600; 310; 30; 20 MG/100ML; MG/100ML; MG/100ML; MG/100ML
INJECTION, SOLUTION INTRAVENOUS CONTINUOUS PRN
Status: DISCONTINUED | OUTPATIENT
Start: 2023-07-11 | End: 2023-07-11 | Stop reason: SDUPTHER

## 2023-07-11 RX ORDER — SODIUM CHLORIDE, SODIUM LACTATE, POTASSIUM CHLORIDE, AND CALCIUM CHLORIDE .6; .31; .03; .02 G/100ML; G/100ML; G/100ML; G/100ML
1000 INJECTION, SOLUTION INTRAVENOUS AS NEEDED
Status: DISCONTINUED | OUTPATIENT
Start: 2023-07-11 | End: 2023-07-14 | Stop reason: HOSPADM

## 2023-07-11 RX ORDER — HALOPERIDOL 5 MG/ML
1 INJECTION INTRAMUSCULAR EVERY 6 HOURS PRN
Status: DISCONTINUED | OUTPATIENT
Start: 2023-07-11 | End: 2023-07-14 | Stop reason: HOSPADM

## 2023-07-11 RX ORDER — ONDANSETRON 4 MG/1
4 TABLET, ORALLY DISINTEGRATING ORAL EVERY 8 HOURS PRN
Status: DISCONTINUED | OUTPATIENT
Start: 2023-07-11 | End: 2023-07-11

## 2023-07-11 RX ORDER — HYDROMORPHONE HYDROCHLORIDE 1 MG/ML
0.25 INJECTION, SOLUTION INTRAMUSCULAR; INTRAVENOUS; SUBCUTANEOUS EVERY 6 HOURS PRN
Status: DISPENSED | OUTPATIENT
Start: 2023-07-11 | End: 2023-07-12

## 2023-07-11 RX ORDER — NALBUPHINE HYDROCHLORIDE 10 MG/ML
5 INJECTION, SOLUTION INTRAMUSCULAR; INTRAVENOUS; SUBCUTANEOUS EVERY 4 HOURS PRN
Status: DISCONTINUED | OUTPATIENT
Start: 2023-07-11 | End: 2023-07-11

## 2023-07-11 RX ORDER — KETOROLAC TROMETHAMINE 30 MG/ML
30 INJECTION, SOLUTION INTRAMUSCULAR; INTRAVENOUS EVERY 6 HOURS
Status: DISPENSED | OUTPATIENT
Start: 2023-07-11 | End: 2023-07-12

## 2023-07-11 RX ORDER — ROPIVACAINE HYDROCHLORIDE 2 MG/ML
INJECTION, SOLUTION EPIDURAL; INFILTRATION; PERINEURAL PRN
Status: DISCONTINUED | OUTPATIENT
Start: 2023-07-11 | End: 2023-07-11 | Stop reason: SDUPTHER

## 2023-07-11 RX ORDER — CARBOPROST TROMETHAMINE 250 UG/ML
250 INJECTION, SOLUTION INTRAMUSCULAR
OUTPATIENT
Start: 2023-07-11 | End: 2023-07-12

## 2023-07-11 RX ORDER — ACETAMINOPHEN 500 MG
1000 TABLET ORAL EVERY 6 HOURS
Status: COMPLETED | OUTPATIENT
Start: 2023-07-11 | End: 2023-07-12

## 2023-07-11 RX ORDER — MORPHINE SULFATE 0.5 MG/ML
INJECTION, SOLUTION EPIDURAL; INTRATHECAL; INTRAVENOUS PRN
Status: DISCONTINUED | OUTPATIENT
Start: 2023-07-11 | End: 2023-07-11 | Stop reason: SDUPTHER

## 2023-07-11 RX ORDER — AMOXICILLIN 500 MG/1
500 CAPSULE ORAL EVERY 8 HOURS SCHEDULED
Status: DISCONTINUED | OUTPATIENT
Start: 2023-07-13 | End: 2023-07-11

## 2023-07-11 RX ORDER — BETAMETHASONE SODIUM PHOSPHATE AND BETAMETHASONE ACETATE 3; 3 MG/ML; MG/ML
12 INJECTION, SUSPENSION INTRA-ARTICULAR; INTRALESIONAL; INTRAMUSCULAR; SOFT TISSUE EVERY 24 HOURS
Status: DISCONTINUED | OUTPATIENT
Start: 2023-07-11 | End: 2023-07-11

## 2023-07-11 RX ORDER — SODIUM CHLORIDE, SODIUM LACTATE, POTASSIUM CHLORIDE, AND CALCIUM CHLORIDE .6; .31; .03; .02 G/100ML; G/100ML; G/100ML; G/100ML
500 INJECTION, SOLUTION INTRAVENOUS PRN
Status: DISCONTINUED | OUTPATIENT
Start: 2023-07-11 | End: 2023-07-14 | Stop reason: HOSPADM

## 2023-07-11 RX ORDER — LIDOCAINE HYDROCHLORIDE 10 MG/ML
1 INJECTION, SOLUTION INFILTRATION; PERINEURAL
Status: ACTIVE | OUTPATIENT
Start: 2023-07-11 | End: 2023-07-12

## 2023-07-11 RX ORDER — ROPIVACAINE HYDROCHLORIDE 5 MG/ML
INJECTION, SOLUTION EPIDURAL; INFILTRATION; PERINEURAL PRN
Status: DISCONTINUED | OUTPATIENT
Start: 2023-07-11 | End: 2023-07-11 | Stop reason: SDUPTHER

## 2023-07-11 RX ORDER — ONDANSETRON 2 MG/ML
4 INJECTION INTRAMUSCULAR; INTRAVENOUS ONCE
Status: COMPLETED | OUTPATIENT
Start: 2023-07-11 | End: 2023-07-11

## 2023-07-11 RX ORDER — KETOROLAC TROMETHAMINE 30 MG/ML
30 INJECTION, SOLUTION INTRAMUSCULAR; INTRAVENOUS ONCE
Status: DISCONTINUED | OUTPATIENT
Start: 2023-07-12 | End: 2023-07-12 | Stop reason: HOSPADM

## 2023-07-11 RX ORDER — CALCIUM GLUCONATE 94 MG/ML
1000 INJECTION, SOLUTION INTRAVENOUS PRN
Status: DISCONTINUED | OUTPATIENT
Start: 2023-07-11 | End: 2023-07-11

## 2023-07-11 RX ORDER — METHYLERGONOVINE MALEATE 0.2 MG/ML
200 INJECTION INTRAVENOUS PRN
OUTPATIENT
Start: 2023-07-11

## 2023-07-11 RX ORDER — LIDOCAINE HYDROCHLORIDE AND EPINEPHRINE 15; 5 MG/ML; UG/ML
INJECTION, SOLUTION EPIDURAL PRN
Status: DISCONTINUED | OUTPATIENT
Start: 2023-07-11 | End: 2023-07-11 | Stop reason: SDUPTHER

## 2023-07-11 RX ORDER — ONDANSETRON 2 MG/ML
4 INJECTION INTRAMUSCULAR; INTRAVENOUS EVERY 6 HOURS PRN
Status: DISPENSED | OUTPATIENT
Start: 2023-07-11 | End: 2023-07-12

## 2023-07-11 RX ORDER — TRANEXAMIC ACID 10 MG/ML
1000 INJECTION, SOLUTION INTRAVENOUS
OUTPATIENT
Start: 2023-07-11 | End: 2023-07-12

## 2023-07-11 RX ORDER — LIDOCAINE HYDROCHLORIDE AND EPINEPHRINE 20; 5 MG/ML; UG/ML
INJECTION, SOLUTION EPIDURAL; INFILTRATION; INTRACAUDAL; PERINEURAL PRN
Status: DISCONTINUED | OUTPATIENT
Start: 2023-07-11 | End: 2023-07-11 | Stop reason: SDUPTHER

## 2023-07-11 RX ORDER — MISOPROSTOL 200 UG/1
800 TABLET ORAL PRN
OUTPATIENT
Start: 2023-07-11

## 2023-07-11 RX ORDER — MAGNESIUM SULFATE HEPTAHYDRATE 40 MG/ML
4000 INJECTION, SOLUTION INTRAVENOUS ONCE
Status: DISCONTINUED | OUTPATIENT
Start: 2023-07-11 | End: 2023-07-11

## 2023-07-11 RX ORDER — SODIUM CHLORIDE 9 MG/ML
INJECTION, SOLUTION INTRAVENOUS PRN
Status: DISCONTINUED | OUTPATIENT
Start: 2023-07-11 | End: 2023-07-14 | Stop reason: HOSPADM

## 2023-07-11 RX ORDER — EPHEDRINE SULFATE/0.9% NACL/PF 50 MG/5 ML
SYRINGE (ML) INTRAVENOUS PRN
Status: DISCONTINUED | OUTPATIENT
Start: 2023-07-11 | End: 2023-07-11 | Stop reason: SDUPTHER

## 2023-07-11 RX ORDER — OXYCODONE HYDROCHLORIDE 5 MG/1
5 TABLET ORAL EVERY 4 HOURS PRN
Status: ACTIVE | OUTPATIENT
Start: 2023-07-11 | End: 2023-07-12

## 2023-07-11 RX ORDER — SODIUM CHLORIDE 0.9 % (FLUSH) 0.9 %
5-40 SYRINGE (ML) INJECTION PRN
Status: DISCONTINUED | OUTPATIENT
Start: 2023-07-11 | End: 2023-07-14 | Stop reason: HOSPADM

## 2023-07-11 RX ORDER — SODIUM CHLORIDE, SODIUM LACTATE, POTASSIUM CHLORIDE, CALCIUM CHLORIDE 600; 310; 30; 20 MG/100ML; MG/100ML; MG/100ML; MG/100ML
INJECTION, SOLUTION INTRAVENOUS CONTINUOUS
Status: DISCONTINUED | OUTPATIENT
Start: 2023-07-11 | End: 2023-07-14 | Stop reason: HOSPADM

## 2023-07-11 RX ORDER — AZITHROMYCIN 250 MG/1
500 TABLET, FILM COATED ORAL DAILY
Status: DISCONTINUED | OUTPATIENT
Start: 2023-07-13 | End: 2023-07-11

## 2023-07-11 RX ORDER — MISOPROSTOL 200 UG/1
200 TABLET ORAL PRN
OUTPATIENT
Start: 2023-07-11

## 2023-07-11 RX ORDER — ONDANSETRON 2 MG/ML
4 INJECTION INTRAMUSCULAR; INTRAVENOUS EVERY 6 HOURS PRN
Status: DISCONTINUED | OUTPATIENT
Start: 2023-07-11 | End: 2023-07-11

## 2023-07-11 RX ORDER — TRISODIUM CITRATE DIHYDRATE AND CITRIC ACID MONOHYDRATE 500; 334 MG/5ML; MG/5ML
30 SOLUTION ORAL ONCE
Status: COMPLETED | OUTPATIENT
Start: 2023-07-11 | End: 2023-07-11

## 2023-07-11 RX ORDER — NALOXONE HYDROCHLORIDE 0.4 MG/ML
INJECTION, SOLUTION INTRAMUSCULAR; INTRAVENOUS; SUBCUTANEOUS PRN
Status: ACTIVE | OUTPATIENT
Start: 2023-07-11 | End: 2023-07-12

## 2023-07-11 RX ADMIN — ROPIVACAINE HYDROCHLORIDE 8 ML/HR: 2 INJECTION, SOLUTION EPIDURAL; INFILTRATION; PERINEURAL at 03:25

## 2023-07-11 RX ADMIN — PHENYLEPHRINE HYDROCHLORIDE 200 MCG: 0.1 INJECTION, SOLUTION INTRAVENOUS at 11:12

## 2023-07-11 RX ADMIN — HYDROMORPHONE HYDROCHLORIDE 0.25 MG: 1 INJECTION, SOLUTION INTRAMUSCULAR; INTRAVENOUS; SUBCUTANEOUS at 12:32

## 2023-07-11 RX ADMIN — KETOROLAC TROMETHAMINE 30 MG: 30 INJECTION, SOLUTION INTRAMUSCULAR; INTRAVENOUS at 11:54

## 2023-07-11 RX ADMIN — ROPIVACAINE HYDROCHLORIDE 3 ML: 2 INJECTION, SOLUTION EPIDURAL; INFILTRATION; PERINEURAL at 03:23

## 2023-07-11 RX ADMIN — CEFAZOLIN 2000 MG: 10 INJECTION, POWDER, FOR SOLUTION INTRAVENOUS at 10:46

## 2023-07-11 RX ADMIN — PHENYLEPHRINE HYDROCHLORIDE 200 MCG: 0.1 INJECTION, SOLUTION INTRAVENOUS at 11:33

## 2023-07-11 RX ADMIN — SODIUM CHLORIDE, SODIUM LACTATE, POTASSIUM CHLORIDE, AND CALCIUM CHLORIDE: 600; 310; 30; 20 INJECTION, SOLUTION INTRAVENOUS at 10:56

## 2023-07-11 RX ADMIN — SODIUM CHLORIDE, POTASSIUM CHLORIDE, SODIUM LACTATE AND CALCIUM CHLORIDE: 600; 310; 30; 20 INJECTION, SOLUTION INTRAVENOUS at 21:02

## 2023-07-11 RX ADMIN — Medication 10 MG: at 11:44

## 2023-07-11 RX ADMIN — PHENYLEPHRINE HYDROCHLORIDE 200 MCG: 0.1 INJECTION, SOLUTION INTRAVENOUS at 11:29

## 2023-07-11 RX ADMIN — PHENYLEPHRINE HYDROCHLORIDE 100 MCG: 0.1 INJECTION, SOLUTION INTRAVENOUS at 11:43

## 2023-07-11 RX ADMIN — LIDOCAINE HYDROCHLORIDE,EPINEPHRINE BITARTRATE 3 ML: 20; .005 INJECTION, SOLUTION EPIDURAL; INFILTRATION; INTRACAUDAL; PERINEURAL at 10:54

## 2023-07-11 RX ADMIN — KETOROLAC TROMETHAMINE 30 MG: 30 INJECTION, SOLUTION INTRAMUSCULAR; INTRAVENOUS at 17:38

## 2023-07-11 RX ADMIN — LIDOCAINE HYDROCHLORIDE,EPINEPHRINE BITARTRATE 15 ML: 20; .005 INJECTION, SOLUTION EPIDURAL; INFILTRATION; INTRACAUDAL; PERINEURAL at 10:50

## 2023-07-11 RX ADMIN — Medication 500 ML/HR: at 11:26

## 2023-07-11 RX ADMIN — PHENYLEPHRINE HYDROCHLORIDE 200 MCG: 0.1 INJECTION, SOLUTION INTRAVENOUS at 11:23

## 2023-07-11 RX ADMIN — ONDANSETRON 4 MG: 2 INJECTION INTRAMUSCULAR; INTRAVENOUS at 11:29

## 2023-07-11 RX ADMIN — PHENYLEPHRINE HYDROCHLORIDE 100 MCG: 0.1 INJECTION, SOLUTION INTRAVENOUS at 10:56

## 2023-07-11 RX ADMIN — SODIUM BICARBONATE 50 MEQ: 84 INJECTION, SOLUTION INTRAVENOUS at 10:50

## 2023-07-11 RX ADMIN — PHENYLEPHRINE HYDROCHLORIDE 200 MCG: 0.1 INJECTION, SOLUTION INTRAVENOUS at 10:51

## 2023-07-11 RX ADMIN — PHENYLEPHRINE HYDROCHLORIDE 100 MCG: 0.1 INJECTION, SOLUTION INTRAVENOUS at 11:03

## 2023-07-11 RX ADMIN — MORPHINE SULFATE 4 MG: 0.5 INJECTION, SOLUTION EPIDURAL; INTRATHECAL; INTRAVENOUS at 11:47

## 2023-07-11 RX ADMIN — ACETAMINOPHEN 1000 MG: 500 TABLET, FILM COATED ORAL at 19:49

## 2023-07-11 RX ADMIN — KETOROLAC TROMETHAMINE 30 MG: 30 INJECTION, SOLUTION INTRAMUSCULAR; INTRAVENOUS at 23:09

## 2023-07-11 RX ADMIN — SODIUM CHLORIDE, POTASSIUM CHLORIDE, SODIUM LACTATE AND CALCIUM CHLORIDE 1000 ML: 600; 310; 30; 20 INJECTION, SOLUTION INTRAVENOUS at 02:30

## 2023-07-11 RX ADMIN — SODIUM CHLORIDE, SODIUM LACTATE, POTASSIUM CHLORIDE, AND CALCIUM CHLORIDE: 600; 310; 30; 20 INJECTION, SOLUTION INTRAVENOUS at 11:46

## 2023-07-11 RX ADMIN — ONDANSETRON 4 MG: 2 INJECTION INTRAMUSCULAR; INTRAVENOUS at 19:45

## 2023-07-11 RX ADMIN — SODIUM CHLORIDE, POTASSIUM CHLORIDE, SODIUM LACTATE AND CALCIUM CHLORIDE: 600; 310; 30; 20 INJECTION, SOLUTION INTRAVENOUS at 02:53

## 2023-07-11 RX ADMIN — ONDANSETRON 4 MG: 2 INJECTION INTRAMUSCULAR; INTRAVENOUS at 10:45

## 2023-07-11 RX ADMIN — METOCLOPRAMIDE 10 MG: 5 INJECTION, SOLUTION INTRAMUSCULAR; INTRAVENOUS at 10:47

## 2023-07-11 RX ADMIN — PHENYLEPHRINE HYDROCHLORIDE 100 MCG: 0.1 INJECTION, SOLUTION INTRAVENOUS at 11:20

## 2023-07-11 RX ADMIN — SODIUM CITRATE AND CITRIC ACID MONOHYDRATE 30 ML: 500; 334 SOLUTION ORAL at 10:46

## 2023-07-11 RX ADMIN — PHENYLEPHRINE HYDROCHLORIDE 100 MCG: 0.1 INJECTION, SOLUTION INTRAVENOUS at 11:05

## 2023-07-11 RX ADMIN — LIDOCAINE HYDROCHLORIDE,EPINEPHRINE BITARTRATE 5 ML: 15; .005 INJECTION, SOLUTION EPIDURAL; INFILTRATION; INTRACAUDAL; PERINEURAL at 03:17

## 2023-07-11 RX ADMIN — ROPIVACAINE HYDROCHLORIDE 2 ML: 5 INJECTION, SOLUTION EPIDURAL; INFILTRATION; PERINEURAL at 03:24

## 2023-07-11 RX ADMIN — HALOPERIDOL LACTATE 1 MG: 5 INJECTION, SOLUTION INTRAMUSCULAR at 15:37

## 2023-07-11 RX ADMIN — LIDOCAINE HYDROCHLORIDE,EPINEPHRINE BITARTRATE 2 ML: 20; .005 INJECTION, SOLUTION EPIDURAL; INFILTRATION; INTRACAUDAL; PERINEURAL at 10:56

## 2023-07-11 RX ADMIN — AZITHROMYCIN MONOHYDRATE 500 MG: 500 INJECTION, POWDER, LYOPHILIZED, FOR SOLUTION INTRAVENOUS at 10:45

## 2023-07-11 ASSESSMENT — PAIN DESCRIPTION - DESCRIPTORS: DESCRIPTORS: CRAMPING

## 2023-07-11 ASSESSMENT — PAIN DESCRIPTION - FREQUENCY: FREQUENCY: INTERMITTENT

## 2023-07-11 ASSESSMENT — PAIN DESCRIPTION - LOCATION
LOCATION: ABDOMEN
LOCATION: ABDOMEN

## 2023-07-11 ASSESSMENT — PAIN SCALES - GENERAL
PAINLEVEL_OUTOF10: 5
PAINLEVEL_OUTOF10: 7

## 2023-07-11 NOTE — PROGRESS NOTES
Pt with questions and concerns regarding celestone and magnesium sulfate; wants more information on benefits and if treatments are necessary. Dr. Ruggiero Comp called, will come to talk to pt.

## 2023-07-11 NOTE — CONSULTS
Neonatology Antepartum Consultation    Name: Don Bernstein      Medical Record Number: [id number]      YOB: 1985     Today's Date: 2023                                                                 Date of Consultation:  2023  Time: 2:23 AM  Attending MD: Meche Steele MD  Consultant Physician: Bobbi Anderson MD  Reason for Consultation:  PPROM at 35 + 0 weeks    Subjective:     Age: 44 y.o.  Jm Sees: [id number]  Gestation age: 33w0d      Maternal steroids:  No.     Objective:     Medications:   Current Facility-Administered Medications   Medication Dose Route Frequency    lactated ringers IV soln infusion   IntraVENous Continuous    ondansetron (ZOFRAN-ODT) disintegrating tablet 4 mg  4 mg Oral Q8H PRN    Or    ondansetron (ZOFRAN) injection 4 mg  4 mg IntraVENous Q6H PRN    ampicillin (OMNIPEN) 500 mg in sodium chloride 0.9 % 50 mL IVPB (mini-bag)  500 mg IntraVENous Q6H    Followed by    Shiraz Waldron ON 2023] amoxicillin (AMOXIL) capsule 500 mg  500 mg Oral 3 times per day    azithromycin (ZITHROMAX) 500 mg in sodium chloride 0.9 % 250 mL IVPB (Vuox1Hss)  500 mg IntraVENous Q24H    Followed by    Shiraz Waldron ON 2023] azithromycin (ZITHROMAX) tablet 500 mg  500 mg Oral Daily    magnesium sulfate (44315 mg/500mL infusion)  2,000 mg/hr IntraVENous Continuous    calcium gluconate 10 % injection 1,000 mg  1,000 mg IntraVENous PRN    magnesium sulfate 4000 mg in 100 mL IVPB premix  4,000 mg IntraVENous Once    betamethasone acetate-betamethasone sodium phosphate (CELESTONE) injection 12 mg  12 mg IntraMUSCular Q24H       Data Review:  Lab:   Lab Results   Component Value Date/Time    ABORH A POSITIVE 2023 03:33 PM    HIVEXT N.R. 2017 12:00 AM       Assessment:      Principal Problem:     premature rupture of membranes (PPROM) with unknown onset of labor  Resolved Problems:    * No resolved hospital problems. *    Ms. Chas Wood is a 44 y.o.   female with an

## 2023-07-11 NOTE — L&D DELIVERY NOTE
Pratima, Baby Boy Dotty Bradley [[id number]]      Labor Events     Labor: Yes   Steroids: None  Cervical Ripening Date/Time:      Antibiotics Received during Labor: No  Rupture Date/Time:  7/10/23 23:00:00   Rupture Type: PPROM  Fluid Color: Clear  Fluid Odor: None  Fluid Volume:  Moderate              Anesthesia    Method: Epidural       Labor Event Times      Labor onset date/time:  7/10/23 23:00:00     Dilation complete date/time:        Start pushing date/time:     Decision date/time (emergent ):            Labor Length    3rd stage: 0h 00m       Delivery Details      Delivery Date: 23 Delivery Time: 11:26:00   Delivery Type: , Low Vertical  Trial of Labor?: No   Categorization: Primary   Priority: Non-scheduled  Indications for : Breech       Skin Incision Type: Pfannenstiel       Billings Presentation    Presentation: Breech       Shoulder Dystocia    Shoulder Dystocia Present?: No       Assisted Delivery Details    Forceps Attempted?: No  Vacuum Extractor Attempted?: No                           Cord    Vessels: 3 Vessels  Complications: None  Delayed Cord Clamping?: No  Cord Blood Disposition: Discard  Gases Sent?: Yes              Placenta    Date/Time: 2023 11:26:41  Removal: Expressed  Appearance: Intact  Disposition: Discarded       Lacerations           Blood Loss  Mother: Stephanie Hebert #[id number]     Start of Mother's Information      Delivery Blood Loss  07/10/23 2300 - 23 1209      Quantitative Blood Loss (mL) Hospital Encounter 750 grams    Total  750 mL               End of Mother's Information  Mother: Stephanie Hebert #[id number]                Delivery Providers    Delivering clinician: Dirk Fang MD     Provider Role    Dirk Fang MD Obstetrician    Kiesha Shepherd, SEDA Primary Nurse    Jose Luis Rojas [facility] Sydney Breen MD Neonatologist    Christopher Craig MD Anesthesiologist    Tamara Bello

## 2023-07-11 NOTE — PROGRESS NOTES
US confirms complete breech. Advised patient the recommendations are to treat with steroids, antibiotics and magnesium. Patient refuses these treatments. Other alternative of proceeding with CS given her PPROM status and high likelihood of developing chorio - especially in light of her 6 prior term deliveries - which would lead to fetal sepsis. Also high risk for cord prolapse with double footling. I feel that proceeding with delivery at this time under these circumstances presents a far less risk to the baby. Patient wishes to proceed with CS delivery at this time. The procedure was reviewed in detail as well as the risks of bleeding, infection, DVT and potential surgical complications involving the bladder, ureters, colon or intestines. Also the alternatives,  benefits, and recovery. Prevention of DVT & SSI discussed as well. All of her questions were answered.     Chayo Matos MD  July 11, 2023

## 2023-07-11 NOTE — ANESTHESIA PROCEDURE NOTES
Epidural Block    Patient location during procedure: OB  Start time: 7/11/2023 3:11 AM  End time: 7/11/2023 3:21 AM  Reason for block: labor epidural  Staffing  Performed: anesthesiologist   Anesthesiologist: Bridger Roman MD  Epidural  Patient position: sitting  Prep: ChloraPrep  Patient monitoring: continuous pulse ox  Location: L3-4  Injection technique: MAIKEL air  Provider prep: mask and sterile gloves  Needle  Needle type: Tuohy   Needle gauge: 17 G  Needle length: 3.5 in  Needle insertion depth: 4.5 cm  Catheter type: end hole  Catheter size: 19 G  Catheter at skin depth: 10 cm  Test dose: negativeCatheter Secured: tegaderm and tape  Assessment  Hemodynamics: stable  Attempts: 1  Outcomes: uncomplicated and patient tolerated procedure well  Additional Notes  Loss without dense ligament at 4.5 cm; inserted a long 25g pencan through the epidural and had 'pop' and CSF.   Pencan withdrawn and catheter inserted (dural puncture epidural)  Preanesthetic Checklist  Completed: patient identified, IV checked, risks and benefits discussed, surgical/procedural consents, equipment checked, pre-op evaluation, timeout performed, anesthesia consent given, oxygen available and monitors applied/VS acknowledged

## 2023-07-11 NOTE — LACTATION NOTE
Experienced breastfeedng mom pumping for baby in [address] First  baby. Did just 1 side so far and got 5 ml. Demonstrated use of Symphony pump and also hand expression. Assisted with colostrum retrieval from pump. Offered assistance in NCU once baby able to go to breast.  Provided labels for milk. Reviewed NCU packet. Reviewed need to double pump 8 times in 24 hours. Discussed proper pump cleaning and milk storage for baby in NCU. Discussed NCU pump available for moms who are discharged before baby. Encouraged mom to pump at baby's bedside as well. Suggest skin to skin as often as able.

## 2023-07-11 NOTE — PROGRESS NOTES
Dr Esperanza Hunter at bedside, discussing pt questions and concerns, pt declines betamethasone and magnesium sulfate at this time, Dr Hayden Barillas to bedside to discuss any questions or concerns as well, pt states understanding, declines betamethasone, magnesium sulfate, and antibiotics at this time.

## 2023-07-11 NOTE — H&P
History & Physical    Name: Tristin Reyes MRN: [id number]  SSN: [id number]    YOB: 1985  Age: 44 y.o. Sex: female      Subjective:     Reason for Triage visit:  33w0d and lof? History of Present Illness: Ms. Virginia Merlos is a 44 y.o.  female with an estimated gestational age of 28w0d with Estimated Date of Delivery: 23. Patient states that she had a large gush of fluid at 11 pm.  It has continued to leak since this time. Clear fluid on perineum. Pregnancy has been  complicated by AMA, hx of macrosomia, fibroids. Patient denies abdominal pain  , chest pain, fever, headache , nausea and vomiting, pelvic pressure, right upper quadrant pain  , shortness of breath, swelling, vaginal bleeding , vaginal leaking of fluid , and visual disturbances.     OB History    Para Term  AB Living   9 6 6 0 2 6   SAB IAB Ectopic Molar Multiple Live Births             6      # Outcome Date GA Lbr Erlin/2nd Weight Sex Delivery Anes PTL Lv   9 Current            8 AB 22 6w4d          7 Term 20 39w0d 03:46 / 00:12 8 lb 8.7 oz (3.875 kg) F Vag-Spont EPI N ELSA   6 AB 19           5 Term 17 41w5d  9 lb 6.4 oz (4.265 kg) F Vag-Spont  N ELSA   4 Term 03/07/15    M Vag-Spont None N ELSA   3 Term 12    F Vag-Spont None N ELSA   2 Term 06/08/10    M Vag-Spont None N ELSA   1 Term 08    F Vag-Spont EPI N ELSA     Past Medical History:   Diagnosis Date    Anemia 2020    COVID-19 2022    COVID-19 vaccine series declined     Dysmenorrhea     History of delivery of macrosomal infant     Menorrhagia     Palpitations 11/10/2015    Uterine fibroid      Past Surgical History:   Procedure Laterality Date    WISDOM TOOTH EXTRACTION       Social History     Occupational History    Not on file   Tobacco Use    Smoking status: Never    Smokeless tobacco: Never   Vaping Use    Vaping Use: Never used   Substance and Sexual Activity    Alcohol use: No    Drug use: No    Sexual

## 2023-07-11 NOTE — PROGRESS NOTES
OB ED       Admit to [facility] 2. EFM and TOCO applied. States her water broke around 2300; fluid continues to leak per pt. She does not feel she's denise. Abd palpated soft. Positive fetal movement noted.        Dr. Mari Flood notified of pt's arrival.

## 2023-07-11 NOTE — PROGRESS NOTES
RN at bedside cervical exam performed, cervix 1 cm RN unable to determine presenting part. Dr Lynda Recio made aware. BS ultrasound performed, baby breech. Plan of care options discussed with patient by provider, patient would like some time to consider options and will let RN know.

## 2023-07-12 ENCOUNTER — ANESTHESIA EVENT (OUTPATIENT)
Dept: MOTHER INFANT UNIT | Age: 38
End: 2023-07-12

## 2023-07-12 ENCOUNTER — ANESTHESIA (OUTPATIENT)
Dept: MOTHER INFANT UNIT | Age: 38
End: 2023-07-12

## 2023-07-12 PROBLEM — D62 ABLA (ACUTE BLOOD LOSS ANEMIA): Status: ACTIVE | Noted: 2023-07-12

## 2023-07-12 PROBLEM — Z98.891 HISTORY OF CLASSICAL CESAREAN SECTION: Status: ACTIVE | Noted: 2023-07-12

## 2023-07-12 LAB — HGB BLD-MCNC: 8.1 G/DL (ref 11.7–15.4)

## 2023-07-12 PROCEDURE — 85018 HEMOGLOBIN: CPT

## 2023-07-12 PROCEDURE — 51701 INSERT BLADDER CATHETER: CPT

## 2023-07-12 PROCEDURE — 6370000000 HC RX 637 (ALT 250 FOR IP): Performed by: OBSTETRICS & GYNECOLOGY

## 2023-07-12 PROCEDURE — 1100000000 HC RM PRIVATE

## 2023-07-12 PROCEDURE — 2580000003 HC RX 258: Performed by: OBSTETRICS & GYNECOLOGY

## 2023-07-12 PROCEDURE — 6360000002 HC RX W HCPCS: Performed by: OBSTETRICS & GYNECOLOGY

## 2023-07-12 PROCEDURE — 36415 COLL VENOUS BLD VENIPUNCTURE: CPT

## 2023-07-12 PROCEDURE — 6370000000 HC RX 637 (ALT 250 FOR IP): Performed by: ANESTHESIOLOGY

## 2023-07-12 RX ORDER — FERROUS SULFATE 325(65) MG
325 TABLET ORAL 2 TIMES DAILY WITH MEALS
Status: DISCONTINUED | OUTPATIENT
Start: 2023-07-12 | End: 2023-07-14 | Stop reason: HOSPADM

## 2023-07-12 RX ORDER — ACETAMINOPHEN 500 MG
1000 TABLET ORAL EVERY 6 HOURS PRN
Status: DISCONTINUED | OUTPATIENT
Start: 2023-07-12 | End: 2023-07-14 | Stop reason: HOSPADM

## 2023-07-12 RX ORDER — LANOLIN
CREAM (ML) TOPICAL
Status: DISCONTINUED | OUTPATIENT
Start: 2023-07-12 | End: 2023-07-14 | Stop reason: HOSPADM

## 2023-07-12 RX ORDER — MISOPROSTOL 200 UG/1
200 TABLET ORAL
Status: ACTIVE | OUTPATIENT
Start: 2023-07-12 | End: 2023-07-13

## 2023-07-12 RX ORDER — PRENATAL VIT/IRON FUM/FOLIC AC 27MG-0.8MG
1 TABLET ORAL DAILY
Status: DISCONTINUED | OUTPATIENT
Start: 2023-07-12 | End: 2023-07-14 | Stop reason: HOSPADM

## 2023-07-12 RX ORDER — NALOXONE HYDROCHLORIDE 0.4 MG/ML
0.4 INJECTION, SOLUTION INTRAMUSCULAR; INTRAVENOUS; SUBCUTANEOUS PRN
Status: DISCONTINUED | OUTPATIENT
Start: 2023-07-12 | End: 2023-07-14 | Stop reason: HOSPADM

## 2023-07-12 RX ORDER — OXYCODONE HYDROCHLORIDE 5 MG/1
5 TABLET ORAL EVERY 4 HOURS PRN
Status: DISCONTINUED | OUTPATIENT
Start: 2023-07-12 | End: 2023-07-14 | Stop reason: HOSPADM

## 2023-07-12 RX ORDER — ONDANSETRON 8 MG/1
8 TABLET, ORALLY DISINTEGRATING ORAL EVERY 8 HOURS PRN
Status: DISCONTINUED | OUTPATIENT
Start: 2023-07-12 | End: 2023-07-14 | Stop reason: HOSPADM

## 2023-07-12 RX ORDER — FAMOTIDINE 20 MG/1
20 TABLET, FILM COATED ORAL 2 TIMES DAILY
Status: DISCONTINUED | OUTPATIENT
Start: 2023-07-12 | End: 2023-07-14 | Stop reason: HOSPADM

## 2023-07-12 RX ORDER — DOCUSATE SODIUM 100 MG/1
100 CAPSULE, LIQUID FILLED ORAL 2 TIMES DAILY
Status: DISCONTINUED | OUTPATIENT
Start: 2023-07-12 | End: 2023-07-14 | Stop reason: HOSPADM

## 2023-07-12 RX ORDER — OXYCODONE HYDROCHLORIDE 5 MG/1
10 TABLET ORAL EVERY 4 HOURS PRN
Status: DISCONTINUED | OUTPATIENT
Start: 2023-07-12 | End: 2023-07-14 | Stop reason: HOSPADM

## 2023-07-12 RX ORDER — IBUPROFEN 800 MG/1
800 TABLET ORAL EVERY 6 HOURS PRN
Status: DISCONTINUED | OUTPATIENT
Start: 2023-07-13 | End: 2023-07-12

## 2023-07-12 RX ORDER — SIMETHICONE 80 MG
80 TABLET,CHEWABLE ORAL EVERY 6 HOURS PRN
Status: DISCONTINUED | OUTPATIENT
Start: 2023-07-12 | End: 2023-07-14 | Stop reason: HOSPADM

## 2023-07-12 RX ORDER — IBUPROFEN 800 MG/1
800 TABLET ORAL EVERY 6 HOURS PRN
Status: DISCONTINUED | OUTPATIENT
Start: 2023-07-12 | End: 2023-07-14 | Stop reason: HOSPADM

## 2023-07-12 RX ORDER — SODIUM CHLORIDE 0.9 % (FLUSH) 0.9 %
5-40 SYRINGE (ML) INJECTION EVERY 12 HOURS SCHEDULED
Status: DISCONTINUED | OUTPATIENT
Start: 2023-07-12 | End: 2023-07-14 | Stop reason: HOSPADM

## 2023-07-12 RX ADMIN — SIMETHICONE 80 MG: 80 TABLET, CHEWABLE ORAL at 17:52

## 2023-07-12 RX ADMIN — ACETAMINOPHEN 1000 MG: 500 TABLET, FILM COATED ORAL at 23:58

## 2023-07-12 RX ADMIN — SIMETHICONE 80 MG: 80 TABLET, CHEWABLE ORAL at 13:59

## 2023-07-12 RX ADMIN — ACETAMINOPHEN 1000 MG: 500 TABLET, FILM COATED ORAL at 02:08

## 2023-07-12 RX ADMIN — SODIUM CHLORIDE 125 MG: 9 INJECTION, SOLUTION INTRAVENOUS at 13:36

## 2023-07-12 RX ADMIN — FERROUS SULFATE TAB 325 MG (65 MG ELEMENTAL FE) 325 MG: 325 (65 FE) TAB at 17:52

## 2023-07-12 RX ADMIN — IBUPROFEN 800 MG: 800 TABLET ORAL at 19:50

## 2023-07-12 RX ADMIN — FAMOTIDINE 20 MG: 20 TABLET ORAL at 19:51

## 2023-07-12 RX ADMIN — ACETAMINOPHEN 1000 MG: 500 TABLET, FILM COATED ORAL at 07:54

## 2023-07-12 RX ADMIN — ACETAMINOPHEN 1000 MG: 500 TABLET, FILM COATED ORAL at 13:41

## 2023-07-12 RX ADMIN — DOCUSATE SODIUM 100 MG: 100 CAPSULE, LIQUID FILLED ORAL at 19:51

## 2023-07-12 RX ADMIN — OXYCODONE HYDROCHLORIDE 5 MG: 5 TABLET ORAL at 21:00

## 2023-07-12 ASSESSMENT — PAIN SCALES - GENERAL
PAINLEVEL_OUTOF10: 6
PAINLEVEL_OUTOF10: 6
PAINLEVEL_OUTOF10: 10
PAINLEVEL_OUTOF10: 2
PAINLEVEL_OUTOF10: 4

## 2023-07-12 ASSESSMENT — PAIN DESCRIPTION - ORIENTATION
ORIENTATION: LOWER;ANTERIOR
ORIENTATION: LOWER;ANTERIOR
ORIENTATION: ANTERIOR;LOWER
ORIENTATION: LOWER;ANTERIOR

## 2023-07-12 ASSESSMENT — PAIN DESCRIPTION - LOCATION
LOCATION: ABDOMEN
LOCATION: PERINEUM;ABDOMEN
LOCATION: ABDOMEN
LOCATION: ABDOMEN

## 2023-07-12 ASSESSMENT — PAIN DESCRIPTION - DESCRIPTORS
DESCRIPTORS: SORE
DESCRIPTORS: SHARP;SORE
DESCRIPTORS: CRAMPING;SORE;SHARP
DESCRIPTORS: CRAMPING

## 2023-07-12 ASSESSMENT — PAIN - FUNCTIONAL ASSESSMENT: PAIN_FUNCTIONAL_ASSESSMENT: ACTIVITIES ARE NOT PREVENTED

## 2023-07-12 NOTE — PROGRESS NOTES
Shift assessment complete as noted. Patient requesting nausea medicine. Questions encouraged and answered. Encouraged to call for needs or concerns. Verbalizes understanding.

## 2023-07-12 NOTE — PROGRESS NOTES
Baker and SCD's removed. PIV capped per protocol. Patient up to bathroom with this RN assistance. Vani-care taught and completed. Questions encouraged and answered. Patient ambulating without difficulty, encouraged to call for needs or concerns. Verbalizes understanding.

## 2023-07-12 NOTE — PROGRESS NOTES
Pt straight cathed using sterile technique per order as pt was unable to void and fundus was to the right and +1. 300 ml of urine obtained. Fundus now midline and at U. Educated pt to increase PO fluids. Pt tolerated well.

## 2023-07-13 LAB
HCT VFR BLD AUTO: 26.3 % (ref 35.8–46.3)
HGB BLD-MCNC: 8.4 G/DL (ref 11.7–15.4)

## 2023-07-13 PROCEDURE — 6360000002 HC RX W HCPCS: Performed by: OBSTETRICS & GYNECOLOGY

## 2023-07-13 PROCEDURE — 85014 HEMATOCRIT: CPT

## 2023-07-13 PROCEDURE — 1100000000 HC RM PRIVATE

## 2023-07-13 PROCEDURE — 36415 COLL VENOUS BLD VENIPUNCTURE: CPT

## 2023-07-13 PROCEDURE — 85018 HEMOGLOBIN: CPT

## 2023-07-13 PROCEDURE — 2580000003 HC RX 258: Performed by: OBSTETRICS & GYNECOLOGY

## 2023-07-13 PROCEDURE — 6370000000 HC RX 637 (ALT 250 FOR IP): Performed by: OBSTETRICS & GYNECOLOGY

## 2023-07-13 RX ADMIN — DOCUSATE SODIUM 100 MG: 100 CAPSULE, LIQUID FILLED ORAL at 08:16

## 2023-07-13 RX ADMIN — PRENATAL VIT W/ FE FUMARATE-FA TAB 27-0.8 MG 1 TABLET: 27-0.8 TAB at 08:16

## 2023-07-13 RX ADMIN — FAMOTIDINE 20 MG: 20 TABLET ORAL at 08:16

## 2023-07-13 RX ADMIN — ACETAMINOPHEN 1000 MG: 500 TABLET, FILM COATED ORAL at 19:11

## 2023-07-13 RX ADMIN — ACETAMINOPHEN 1000 MG: 500 TABLET, FILM COATED ORAL at 12:31

## 2023-07-13 RX ADMIN — SODIUM CHLORIDE 125 MG: 9 INJECTION, SOLUTION INTRAVENOUS at 13:31

## 2023-07-13 RX ADMIN — IBUPROFEN 800 MG: 800 TABLET ORAL at 08:16

## 2023-07-13 RX ADMIN — IBUPROFEN 800 MG: 800 TABLET ORAL at 21:14

## 2023-07-13 RX ADMIN — FERROUS SULFATE TAB 325 MG (65 MG ELEMENTAL FE) 325 MG: 325 (65 FE) TAB at 19:11

## 2023-07-13 RX ADMIN — DOCUSATE SODIUM 100 MG: 100 CAPSULE, LIQUID FILLED ORAL at 21:14

## 2023-07-13 RX ADMIN — FAMOTIDINE 20 MG: 20 TABLET ORAL at 21:14

## 2023-07-13 RX ADMIN — IBUPROFEN 800 MG: 800 TABLET ORAL at 14:57

## 2023-07-13 RX ADMIN — ACETAMINOPHEN 1000 MG: 500 TABLET, FILM COATED ORAL at 05:57

## 2023-07-13 RX ADMIN — FERROUS SULFATE TAB 325 MG (65 MG ELEMENTAL FE) 325 MG: 325 (65 FE) TAB at 08:16

## 2023-07-13 RX ADMIN — IBUPROFEN 800 MG: 800 TABLET ORAL at 02:06

## 2023-07-13 ASSESSMENT — PAIN DESCRIPTION - DESCRIPTORS
DESCRIPTORS: CRAMPING;SORE
DESCRIPTORS: SORE
DESCRIPTORS: SORE

## 2023-07-13 ASSESSMENT — PAIN DESCRIPTION - LOCATION
LOCATION: ABDOMEN;INCISION
LOCATION: ABDOMEN
LOCATION: ABDOMEN;INCISION

## 2023-07-13 ASSESSMENT — PAIN SCALES - GENERAL
PAINLEVEL_OUTOF10: 6
PAINLEVEL_OUTOF10: 5
PAINLEVEL_OUTOF10: 5

## 2023-07-13 ASSESSMENT — PAIN DESCRIPTION - ORIENTATION
ORIENTATION: LOWER;ANTERIOR

## 2023-07-13 NOTE — LACTATION NOTE
This note was copied from a baby's chart. In to follow up w/ mom as also called out for lactation questions. Mom has sore area to under both nipples. Appears flange may have not been well centered in prior sessions. Reviewed nipple care to prevent infection and promote healing. Measured nipple size for pump flange. Lubricated flange w/ lanolin this time to help w/ sore area when pumping but dad to go get coconut oil for next pumping session to better lubricate and heal nipples. Mom pumped w/ 24 mm flanges. No further c/o pain, felt better once centered well. Encouraged her to keep pumping at least 8 times per day to help get milk in. Reviewed LPT feeding expectations and pumping behind any breastfeeding attempts still until baby closer to full term and doing consistently well at breast to protect mom's supply. Reviewed normal volumes to pump first week of life. Mom got 10 mls last pump session which is going back up in volume again so mom pleased. No further needs at this time. Lactation to continue to follow for support.

## 2023-07-14 VITALS
OXYGEN SATURATION: 97 % | TEMPERATURE: 98.1 F | SYSTOLIC BLOOD PRESSURE: 110 MMHG | RESPIRATION RATE: 16 BRPM | HEART RATE: 65 BPM | DIASTOLIC BLOOD PRESSURE: 55 MMHG

## 2023-07-14 LAB
BACTERIA SPEC CULT: NORMAL
SERVICE CMNT-IMP: NORMAL

## 2023-07-14 PROCEDURE — 6370000000 HC RX 637 (ALT 250 FOR IP): Performed by: OBSTETRICS & GYNECOLOGY

## 2023-07-14 RX ORDER — OXYCODONE HYDROCHLORIDE 5 MG/1
5 TABLET ORAL EVERY 6 HOURS PRN
Qty: 20 TABLET | Refills: 0 | Status: SHIPPED | OUTPATIENT
Start: 2023-07-14 | End: 2023-07-19

## 2023-07-14 RX ORDER — SIMETHICONE 80 MG
80 TABLET,CHEWABLE ORAL EVERY 6 HOURS PRN
Qty: 180 TABLET | Refills: 3 | COMMUNITY
Start: 2023-07-14

## 2023-07-14 RX ORDER — IBUPROFEN 800 MG/1
800 TABLET ORAL EVERY 6 HOURS PRN
Qty: 60 TABLET | Refills: 1 | Status: SHIPPED | OUTPATIENT
Start: 2023-07-14

## 2023-07-14 RX ORDER — PSEUDOEPHEDRINE HCL 30 MG
100 TABLET ORAL 2 TIMES DAILY
Qty: 60 CAPSULE | Refills: 2 | Status: SHIPPED | OUTPATIENT
Start: 2023-07-14

## 2023-07-14 RX ADMIN — FERROUS SULFATE TAB 325 MG (65 MG ELEMENTAL FE) 325 MG: 325 (65 FE) TAB at 08:39

## 2023-07-14 RX ADMIN — IBUPROFEN 800 MG: 800 TABLET ORAL at 05:15

## 2023-07-14 RX ADMIN — PRENATAL VIT W/ FE FUMARATE-FA TAB 27-0.8 MG 1 TABLET: 27-0.8 TAB at 08:39

## 2023-07-14 RX ADMIN — ACETAMINOPHEN 1000 MG: 500 TABLET, FILM COATED ORAL at 08:39

## 2023-07-14 RX ADMIN — DOCUSATE SODIUM 100 MG: 100 CAPSULE, LIQUID FILLED ORAL at 08:39

## 2023-07-14 RX ADMIN — ACETAMINOPHEN 1000 MG: 500 TABLET, FILM COATED ORAL at 00:50

## 2023-07-14 RX ADMIN — FAMOTIDINE 20 MG: 20 TABLET ORAL at 08:39

## 2023-07-14 RX ADMIN — IBUPROFEN 800 MG: 800 TABLET ORAL at 13:39

## 2023-07-14 ASSESSMENT — PAIN DESCRIPTION - DESCRIPTORS
DESCRIPTORS: SHARP;SORE
DESCRIPTORS: SORE

## 2023-07-14 ASSESSMENT — PAIN SCALES - GENERAL
PAINLEVEL_OUTOF10: 6
PAINLEVEL_OUTOF10: 5

## 2023-07-14 ASSESSMENT — PAIN DESCRIPTION - ORIENTATION
ORIENTATION: LOWER;ANTERIOR
ORIENTATION: ANTERIOR;LOWER

## 2023-07-14 ASSESSMENT — PAIN DESCRIPTION - LOCATION
LOCATION: ABDOMEN;INCISION
LOCATION: ABDOMEN;INCISION

## 2023-07-14 NOTE — CARE COORDINATION
Chart reviewed - baby boy admitted to NICU (33w). SW met with patient/ to complete initial assessment. Family was provided the opportunity to share how they are coping with baby Bharat's need to be in the NICU. Overall, patient/ state that they are doing well. Family has reliable transportation as well as a strong/local support system. Patient denies any history of postpartum depression/anxiety. Patient given informational packet on  mood & anxiety disorders (resources/education). Family denies any additional needs from  at this time. Family has 's contact information should any needs/questions arise.     MINA Noriega, [address] [facility]   [phone]

## 2023-07-14 NOTE — LACTATION NOTE
Mom going home pumping for baby in NCU. Will assist w/ putting baby to breast as able/desired. Mom got 30 ml at last pumping session. Rental declined. Discussed importance of pumping 8 times in 24 hours. Proper storage for baby in NCU reviewed. Encouraged trying at breast and skin so skin as able. Will follow milk supply and ability at breast to assess for nipple shield use. Encouraged mom to continue to pump at baby's bedside as well. Will follow.

## 2023-07-14 NOTE — DISCHARGE INSTRUCTIONS
Section: What to Expect at [address] [place] [address]     A  section, or , is surgery to deliver your baby through a cut that the doctor makes in your lower belly and uterus. The cut is called an incision. You may have some pain in your lower belly and need pain medicine for 1 to 2 weeks. You can expect some vaginal bleeding for several weeks. You will probably need about 6 weeks to fully recover. It's important to take it easy while the incision heals. Avoid heavy lifting, strenuous activities, and exercises that strain the belly muscles while you recover. Ask a family member or friend for help with housework, cooking, and shopping. This care sheet gives you a general idea about how long it will take for you to recover. But each person recovers at a different pace. Follow the steps below to get better as quickly as possible. How can you care for yourself at home? Activity    Rest when you feel tired. Getting enough sleep will help you recover. Try to walk each day. Start by walking a little more than you did the day before. Bit by bit, increase the amount you walk. Walking boosts blood flow and helps prevent pneumonia, constipation, and blood clots. Avoid strenuous activities, such as bicycle riding, jogging, weightlifting, and aerobic exercise, for 6 weeks or until your doctor says it is okay. Until your doctor says it is okay, do not lift anything heavier than your baby. Do not do sit-ups or other exercises that strain the belly muscles for 6 weeks or until your doctor says it is okay. Hold a pillow over your incision when you cough or take deep breaths. This will support your belly and decrease your pain. You may shower as usual. Pat the incision dry when you are done. You will have some vaginal bleeding. Wear sanitary pads. Do not douche or use tampons until your doctor says it is okay. Ask your doctor when you can drive again.      You will probably need

## 2023-07-25 ENCOUNTER — POSTPARTUM VISIT (OUTPATIENT)
Dept: OBGYN CLINIC | Age: 38
End: 2023-07-25

## 2023-07-25 VITALS — SYSTOLIC BLOOD PRESSURE: 100 MMHG | WEIGHT: 166 LBS | BODY MASS INDEX: 27.62 KG/M2 | DIASTOLIC BLOOD PRESSURE: 60 MMHG

## 2023-07-25 DIAGNOSIS — Z09 POSTOP CHECK: Primary | ICD-10-CM

## 2023-07-25 DIAGNOSIS — O09.41 SUPERVISION OF HIGH-RISK PREGNANCY WITH GRAND MULTIPARITY IN FIRST TRIMESTER: ICD-10-CM

## 2023-08-16 ENCOUNTER — TELEPHONE (OUTPATIENT)
Dept: OBGYN CLINIC | Age: 38
End: 2023-08-16

## 2023-08-16 NOTE — TELEPHONE ENCOUNTER
Call from a [facility] rep stating pt called about a bill from labs on 2/16/23 testing for trich. Insurance wants to know if there are any other dx codes that can be used. The code used (STI Screening) is the appropriate code. No other codes can be added. Pt notified.

## 2023-08-24 ENCOUNTER — HOSPITAL ENCOUNTER (OUTPATIENT)
Dept: LAB | Age: 38
Setting detail: SPECIMEN
Discharge: HOME | End: 2023-08-27

## 2023-08-24 ENCOUNTER — POSTPARTUM VISIT (OUTPATIENT)
Dept: OBGYN CLINIC | Age: 38
End: 2023-08-24

## 2023-08-24 VITALS
DIASTOLIC BLOOD PRESSURE: 62 MMHG | SYSTOLIC BLOOD PRESSURE: 100 MMHG | WEIGHT: 167 LBS | HEIGHT: 65 IN | BODY MASS INDEX: 27.82 KG/M2

## 2023-08-24 DIAGNOSIS — S20.129A MILK BLEB, POSTPARTUM: ICD-10-CM

## 2023-08-24 DIAGNOSIS — D62 ABLA (ACUTE BLOOD LOSS ANEMIA): ICD-10-CM

## 2023-08-24 DIAGNOSIS — R39.9 UTI SYMPTOMS: ICD-10-CM

## 2023-08-24 DIAGNOSIS — R35.0 URINARY FREQUENCY: ICD-10-CM

## 2023-08-24 DIAGNOSIS — O92.29 MILK BLEB, POSTPARTUM: ICD-10-CM

## 2023-08-24 DIAGNOSIS — Z30.09 ENCOUNTER FOR COUNSELING REGARDING CONTRACEPTION: ICD-10-CM

## 2023-08-24 DIAGNOSIS — Z86.2 HISTORY OF ANEMIA: ICD-10-CM

## 2023-08-24 LAB
BILIRUBIN, URINE, POC: NORMAL
BLOOD URINE, POC: NORMAL
ERYTHROCYTE [DISTWIDTH] IN BLOOD BY AUTOMATED COUNT: 11.8 % (ref 11.9–14.6)
GLUCOSE URINE, POC: NORMAL
HCT VFR BLD AUTO: 38.5 % (ref 35.8–46.3)
HGB BLD-MCNC: 12.4 G/DL (ref 11.7–15.4)
KETONES, URINE, POC: NORMAL
LEUKOCYTE ESTERASE, URINE, POC: NORMAL
MCH RBC QN AUTO: 30.5 PG (ref 26.1–32.9)
MCHC RBC AUTO-ENTMCNC: 32.2 G/DL (ref 31.4–35)
MCV RBC AUTO: 94.8 FL (ref 82–102)
NITRITE, URINE, POC: NORMAL
NRBC # BLD: 0 K/UL (ref 0–0.2)
PH, URINE, POC: NORMAL (ref 4.6–8)
PLATELET # BLD AUTO: 316 K/UL (ref 150–450)
PMV BLD AUTO: 10.2 FL (ref 9.4–12.3)
PROTEIN,URINE, POC: NORMAL
RBC # BLD AUTO: 4.06 M/UL (ref 4.05–5.2)
SPECIFIC GRAVITY, URINE, POC: NORMAL (ref 1–1.03)
URINALYSIS CLARITY, POC: NORMAL
URINALYSIS COLOR, POC: NORMAL
UROBILINOGEN, POC: NORMAL
WBC # BLD AUTO: 6.5 K/UL (ref 4.3–11.1)

## 2023-08-24 RX ORDER — NITROFURANTOIN 25; 75 MG/1; MG/1
100 CAPSULE ORAL 2 TIMES DAILY
Qty: 14 CAPSULE | Refills: 0 | Status: SHIPPED | OUTPATIENT
Start: 2023-08-24 | End: 2023-08-24 | Stop reason: ALTCHOICE

## 2023-08-24 RX ORDER — CEPHALEXIN 500 MG/1
500 CAPSULE ORAL 2 TIMES DAILY
Qty: 14 CAPSULE | Refills: 0 | Status: SHIPPED | OUTPATIENT
Start: 2023-08-24 | End: 2023-08-31

## 2023-08-27 LAB
BACTERIA SPEC CULT: NORMAL
BACTERIA SPEC CULT: NORMAL
SERVICE CMNT-IMP: NORMAL

## 2023-08-28 ENCOUNTER — PATIENT MESSAGE (OUTPATIENT)
Dept: OBGYN CLINIC | Age: 38
End: 2023-08-28

## 2023-09-28 ENCOUNTER — TELEMEDICINE (OUTPATIENT)
Dept: FAMILY MEDICINE CLINIC | Facility: CLINIC | Age: 38
End: 2023-09-28

## 2023-09-28 DIAGNOSIS — N30.00 ACUTE CYSTITIS WITHOUT HEMATURIA: Primary | ICD-10-CM

## 2023-09-28 DIAGNOSIS — R39.9 UTI SYMPTOMS: Primary | ICD-10-CM

## 2023-09-28 DIAGNOSIS — N30.00 ACUTE CYSTITIS WITHOUT HEMATURIA: ICD-10-CM

## 2023-09-28 LAB
BILIRUBIN, URINE, POC: NEGATIVE
BLOOD URINE, POC: NEGATIVE
GLUCOSE URINE, POC: NEGATIVE
KETONES, URINE, POC: NEGATIVE
LEUKOCYTE ESTERASE, URINE, POC: NORMAL
NITRITE, URINE, POC: NEGATIVE
PH, URINE, POC: 7 (ref 4.6–8)
PROTEIN,URINE, POC: NORMAL
SPECIFIC GRAVITY, URINE, POC: 1.02 (ref 1–1.03)
URINALYSIS CLARITY, POC: CLEAR
URINALYSIS COLOR, POC: NORMAL
UROBILINOGEN, POC: NORMAL

## 2023-09-28 RX ORDER — NITROFURANTOIN 25; 75 MG/1; MG/1
100 CAPSULE ORAL 2 TIMES DAILY
Qty: 10 CAPSULE | Refills: 0 | Status: SHIPPED | OUTPATIENT
Start: 2023-09-28 | End: 2023-10-03

## 2023-10-01 LAB
BACTERIA SPEC CULT: NORMAL
BACTERIA SPEC CULT: NORMAL
SERVICE CMNT-IMP: NORMAL

## 2023-10-04 ENCOUNTER — TELEPHONE (OUTPATIENT)
Dept: FAMILY MEDICINE CLINIC | Facility: CLINIC | Age: 38
End: 2023-10-04

## 2023-10-04 NOTE — TELEPHONE ENCOUNTER
I called the patient and spoke to her about this. She stated that the symptoms are getting better.  She wants to know if she still needs to take the antibiotic that you sent in?

## 2023-10-04 NOTE — TELEPHONE ENCOUNTER
----- Message from Marilynn Gabriel MD sent at 10/3/2023  4:33 PM EDT -----  The urine culture just grew a lot of normal bacteria seen in the vaginal area. How are her symptoms?

## 2024-10-30 ENCOUNTER — OFFICE VISIT (OUTPATIENT)
Dept: OBGYN CLINIC | Age: 39
End: 2024-10-30

## 2024-10-30 VITALS
HEIGHT: 65 IN | BODY MASS INDEX: 29.49 KG/M2 | DIASTOLIC BLOOD PRESSURE: 60 MMHG | WEIGHT: 177 LBS | SYSTOLIC BLOOD PRESSURE: 112 MMHG

## 2024-10-30 DIAGNOSIS — Z12.4 SCREENING FOR CERVICAL CANCER: ICD-10-CM

## 2024-10-30 DIAGNOSIS — Z12.31 ENCOUNTER FOR SCREENING MAMMOGRAM FOR MALIGNANT NEOPLASM OF BREAST: ICD-10-CM

## 2024-10-30 DIAGNOSIS — Z11.51 SCREENING FOR HUMAN PAPILLOMAVIRUS (HPV): ICD-10-CM

## 2024-10-30 DIAGNOSIS — Z01.419 WELL WOMAN EXAM WITH ROUTINE GYNECOLOGICAL EXAM: Primary | ICD-10-CM

## 2024-10-30 PROCEDURE — 99396 PREV VISIT EST AGE 40-64: CPT | Performed by: OBSTETRICS & GYNECOLOGY

## 2024-10-30 NOTE — PROGRESS NOTES
exam reveals contour normal, shape regular, descensus absent, no nodularity, no tenderness and size 6 weeks GA.    Adnexa and parametria exam reveals no masses, + tender on plaption of the R adnexa.  Visual examination of anus and perineum shows no abnormalities.  Musculoskeletal: Normal range of motion.         General: No tenderness.   Lymphadenopathy:      Cervical: No cervical adenopathy.      Upper Body:      Right upper body: No supraclavicular adenopathy.      Left upper body: No supraclavicular adenopathy.      Lower Body: No right inguinal adenopathy. No left inguinal adenopathy.   Skin:     General: Skin is warm and dry.      Coloration: Skin is not pale.      Findings: No erythema or rash.   Neurological:      Mental Status: She is alert and oriented to person, place, and time.      Cranial Nerves: No cranial nerve deficit.      Motor: No abnormal muscle tone.      Coordination: Coordination normal.   Psychiatric:         Behavior: Behavior normal.         Thought Content: Thought content normal.         Judgment: Judgment normal.         1. Well woman exam with routine gynecological exam    2. Screening for cervical cancer    3. Screening for human papillomavirus (HPV)      Orders Placed This Encounter   Procedures    PAP IG, Aptima HPV and rfx 16/18,45 ([id number])     No orders of the defined types were placed in this encounter.      Routine age-appropriate well woman counseling was performed.  Pap guidelines reviewed, individual risk factors and individual preferences. Pap collected today.  Pt to return for imaging.    Return if symptoms worsen or fail to improve, for asap for TVUS & MD visit for pelvic pain, CS scar pain, low back pain, R adnexal TTP.

## 2024-10-31 ENCOUNTER — OFFICE VISIT (OUTPATIENT)
Dept: OBGYN CLINIC | Age: 39
End: 2024-10-31
Payer: COMMERCIAL

## 2024-10-31 ENCOUNTER — PROCEDURE VISIT (OUTPATIENT)
Dept: OBGYN CLINIC | Age: 39
End: 2024-10-31
Payer: COMMERCIAL

## 2024-10-31 VITALS
DIASTOLIC BLOOD PRESSURE: 72 MMHG | WEIGHT: 174 LBS | BODY MASS INDEX: 28.99 KG/M2 | SYSTOLIC BLOOD PRESSURE: 120 MMHG | HEIGHT: 65 IN

## 2024-10-31 DIAGNOSIS — R10.2 PELVIC PAIN: Primary | ICD-10-CM

## 2024-10-31 DIAGNOSIS — R10.31 RLQ ABDOMINAL PAIN: ICD-10-CM

## 2024-10-31 PROCEDURE — 99213 OFFICE O/P EST LOW 20 MIN: CPT | Performed by: OBSTETRICS & GYNECOLOGY

## 2024-10-31 PROCEDURE — 76830 TRANSVAGINAL US NON-OB: CPT | Performed by: OBSTETRICS & GYNECOLOGY

## 2024-10-31 NOTE — PROGRESS NOTES
Chief Complaint   Patient presents with    Ultrasound     Right side pelvic pain      Follow-up         LMP: Patient's last menstrual period was 2023.  Contraception: none        No Known Allergies  Current Outpatient Medications   Medication Sig Dispense Refill    PRENATAL VIT-DOCUSATE-IRON-FA PO Take by mouth       No current facility-administered medications for this visit.     Past Medical History:   Diagnosis Date    Anemia 2020    COVID-19 2022    COVID-19 vaccine series declined     Dysmenorrhea     History of classical  section     History of delivery of macrosomal infant     Menorrhagia     Palpitations 11/10/2015    Uterine fibroid      Past Surgical History:   Procedure Laterality Date     SECTION N/A 2023     SECTION performed by Hamzah Petersen [place] [facility]    WISDOM TOOTH EXTRACTION       Social History     Socioeconomic History    Marital status:      Spouse name: Not on file    Number of children: Not on file    Years of education: Not on file    Highest education level: Not on file   Occupational History    Not on file   Tobacco Use    Smoking status: Never     Passive exposure: Never    Smokeless tobacco: Never   Vaping Use    Vaping status: Never Used   Substance and Sexual Activity    Alcohol use: No    Drug use: No    Sexual activity: Yes     Partners: Male     Birth control/protection: None   Other Topics Concern    Not on file   Social History Narrative    Not on file     Social Determinants of Health     Financial Resource Strain: Low Risk  (3/1/2023)    Overall Financial Resource Strain (CARDIA)     Difficulty of Paying Living Expenses: Not hard at all   Food Insecurity: Not on file (3/1/2023)   Transportation Needs: Unknown (3/1/2023)    PRAPARE - Transportation     Lack of Transportation (Medical): Not on file     Lack of Transportation (Non-Medical): No   Physical Activity: Sufficiently Active (2022)    Exercise Vital Sign

## 2024-11-05 LAB
COLLECTION METHOD: NORMAL
CYTOLOGIST CVX/VAG CYTO: NORMAL
CYTOLOGY CVX/VAG DOC THIN PREP: NORMAL
DATE OF LMP: 0
HPV APTIMA: NEGATIVE
HPV GENOTYPE REFLEX: NORMAL
Lab: NORMAL
OTHER PT INFO: NORMAL
PAP SOURCE: NORMAL
PATH REPORT.FINAL DX SPEC: NORMAL
PREV CYTO INFO: NEGATIVE
PREV TREATMENT RESULTS: NORMAL
PREV TREATMENT: NORMAL
STAT OF ADQ CVX/VAG CYTO-IMP: NORMAL

## 2024-11-08 ENCOUNTER — NURSE ONLY (OUTPATIENT)
Dept: OBGYN CLINIC | Age: 39
End: 2024-11-08

## 2024-11-08 ENCOUNTER — TELEPHONE (OUTPATIENT)
Dept: OBGYN CLINIC | Age: 39
End: 2024-11-08

## 2024-11-08 DIAGNOSIS — R35.0 URINARY FREQUENCY: ICD-10-CM

## 2024-11-08 DIAGNOSIS — R39.9 UTI SYMPTOMS: Primary | ICD-10-CM

## 2024-11-08 DIAGNOSIS — R39.9 UTI SYMPTOMS: ICD-10-CM

## 2024-11-08 NOTE — TELEPHONE ENCOUNTER
See MyChart message from pt.     Called and explained to pt that a urine culture was not sent at her last visit. Pt states she is not having pain but feels like she is not fully emptying her bladder. Offered pt to come in today for a nurse only visit and leave a urine sample. Pt agreed. Appointment scheduled for 10:00 today.

## 2024-11-10 LAB
BACTERIA SPEC CULT: NORMAL
SERVICE CMNT-IMP: NORMAL

## 2025-01-15 ENCOUNTER — PATIENT MESSAGE (OUTPATIENT)
Dept: OBGYN CLINIC | Age: 40
End: 2025-01-15

## 2025-01-15 ENCOUNTER — OFFICE VISIT (OUTPATIENT)
Dept: OBGYN CLINIC | Age: 40
End: 2025-01-15
Payer: COMMERCIAL

## 2025-01-15 VITALS
DIASTOLIC BLOOD PRESSURE: 72 MMHG | BODY MASS INDEX: 27.99 KG/M2 | SYSTOLIC BLOOD PRESSURE: 114 MMHG | WEIGHT: 168 LBS | HEIGHT: 65 IN

## 2025-01-15 DIAGNOSIS — N64.9 LESION OF RIGHT NIPPLE: Primary | ICD-10-CM

## 2025-01-15 PROCEDURE — 99213 OFFICE O/P EST LOW 20 MIN: CPT | Performed by: OBSTETRICS & GYNECOLOGY

## 2025-01-15 NOTE — PROGRESS NOTES
CC:   Chief Complaint   Patient presents with    Breast Problem     Painful, raised \"sore\" on left breast          HPI:    Kelsi  is a 39 y.o. , , No LMP recorded. (Menstrual status: Breastfeeding).,  who is seen for lesion on her breast. Right breast with recurrent painful bump on her right nipple. Reports it opened this AM with bloody discharge. + breastfeeding. No fevers. Not using nipple cream.       GYN HISTORY:  As per HPI        Current Outpatient Medications on File Prior to Visit   Medication Sig Dispense Refill    PRENATAL VIT-DOCUSATE-IRON-FA PO Take by mouth       No current facility-administered medications on file prior to visit.         Past Medical History:   Diagnosis Date    Anemia 2020    COVID-19 2022    COVID-19 vaccine series declined     Dysmenorrhea     History of classical  section     History of delivery of macrosomal infant     Menorrhagia     Palpitations 11/10/2015    Uterine fibroid          Past Surgical History:   Procedure Laterality Date     SECTION N/A 2023     SECTION performed by Hamzah Petersen MD at [facility] [place] [facility]    WISDOM TOOTH EXTRACTION           Outpatient Encounter Medications as of 1/15/2025   Medication Sig Dispense Refill    PRENATAL VIT-DOCUSATE-IRON-FA PO Take by mouth       No facility-administered encounter medications on file as of 1/15/2025.         No Known Allergies      Family History   Problem Relation Age of Onset    No Known Problems Brother     No Known Problems Sister     No Known Problems Father     No Known Problems Mother     No Known Problems Maternal Aunt     No Known Problems Maternal Uncle     Hypertension Neg Hx     No Known Problems Paternal Uncle     No Known Problems Maternal Grandmother     No Known Problems Maternal Grandfather     No Known Problems Paternal Grandmother     No Known Problems Paternal Grandfather     Diabetes Neg Hx     No Known Problems Paternal Aunt          Social History

## 2025-03-19 ENCOUNTER — HOSPITAL ENCOUNTER (EMERGENCY)
Age: 40
Discharge: HOME | End: 2025-03-19
Payer: COMMERCIAL

## 2025-03-19 ENCOUNTER — APPOINTMENT (OUTPATIENT)
Dept: CT IMAGING | Age: 40
End: 2025-03-19
Payer: COMMERCIAL

## 2025-03-19 VITALS
SYSTOLIC BLOOD PRESSURE: 121 MMHG | RESPIRATION RATE: 16 BRPM | HEART RATE: 94 BPM | WEIGHT: 160 LBS | BODY MASS INDEX: 26.63 KG/M2 | OXYGEN SATURATION: 99 % | DIASTOLIC BLOOD PRESSURE: 81 MMHG | TEMPERATURE: 98 F

## 2025-03-19 DIAGNOSIS — R10.30 LOWER ABDOMINAL PAIN: Primary | ICD-10-CM

## 2025-03-19 DIAGNOSIS — K59.00 CONSTIPATION, UNSPECIFIED CONSTIPATION TYPE: ICD-10-CM

## 2025-03-19 LAB
ALBUMIN SERPL-MCNC: 4 G/DL (ref 3.5–5)
ALBUMIN/GLOB SERPL: 1.1 (ref 1–1.9)
ALP SERPL-CCNC: 98 U/L (ref 35–104)
ALT SERPL-CCNC: 21 U/L (ref 8–45)
ANION GAP SERPL CALC-SCNC: 12 MMOL/L (ref 7–16)
APPEARANCE UR: ABNORMAL
AST SERPL-CCNC: 22 U/L (ref 15–37)
BASOPHILS # BLD: 0.05 K/UL (ref 0–0.2)
BASOPHILS NFR BLD: 0.8 % (ref 0–2)
BILIRUB SERPL-MCNC: 0.3 MG/DL (ref 0–1.2)
BILIRUB UR QL: NEGATIVE
BUN SERPL-MCNC: 13 MG/DL (ref 6–23)
CALCIUM SERPL-MCNC: 9.6 MG/DL (ref 8.8–10.2)
CHLORIDE SERPL-SCNC: 101 MMOL/L (ref 98–107)
CO2 SERPL-SCNC: 25 MMOL/L (ref 20–29)
COLOR UR: ABNORMAL
CREAT SERPL-MCNC: 0.7 MG/DL (ref 0.6–1.1)
DIFFERENTIAL METHOD BLD: NORMAL
EOSINOPHIL # BLD: 0.11 K/UL (ref 0–0.8)
EOSINOPHIL NFR BLD: 1.8 % (ref 0.5–7.8)
ERYTHROCYTE [DISTWIDTH] IN BLOOD BY AUTOMATED COUNT: 12 % (ref 11.9–14.6)
GLOBULIN SER CALC-MCNC: 3.5 G/DL (ref 2.3–3.5)
GLUCOSE SERPL-MCNC: 129 MG/DL (ref 70–99)
GLUCOSE UR STRIP.AUTO-MCNC: NEGATIVE MG/DL
HCG SERPL QL: NEGATIVE
HCG UR QL: NEGATIVE
HCT VFR BLD AUTO: 40.5 % (ref 35.8–46.3)
HGB BLD-MCNC: 13.5 G/DL (ref 11.7–15.4)
HGB UR QL STRIP: NEGATIVE
IMM GRANULOCYTES # BLD AUTO: 0.01 K/UL (ref 0–0.5)
IMM GRANULOCYTES NFR BLD AUTO: 0.2 % (ref 0–5)
KETONES UR QL STRIP.AUTO: ABNORMAL MG/DL
LEUKOCYTE ESTERASE UR QL STRIP.AUTO: NEGATIVE
LIPASE SERPL-CCNC: 26 U/L (ref 13–60)
LYMPHOCYTES # BLD: 1.8 K/UL (ref 0.5–4.6)
LYMPHOCYTES NFR BLD: 28.9 % (ref 13–44)
MCH RBC QN AUTO: 29.4 PG (ref 26.1–32.9)
MCHC RBC AUTO-ENTMCNC: 33.3 G/DL (ref 31.4–35)
MCV RBC AUTO: 88.2 FL (ref 82–102)
MONOCYTES # BLD: 0.43 K/UL (ref 0.1–1.3)
MONOCYTES NFR BLD: 6.9 % (ref 4–12)
NEUTS SEG # BLD: 3.83 K/UL (ref 1.7–8.2)
NEUTS SEG NFR BLD: 61.4 % (ref 43–78)
NITRITE UR QL STRIP.AUTO: NEGATIVE
NRBC # BLD: 0 K/UL (ref 0–0.2)
PH UR STRIP: 6.5 (ref 5–9)
PLATELET # BLD AUTO: 352 K/UL (ref 150–450)
PMV BLD AUTO: 9.7 FL (ref 9.4–12.3)
POTASSIUM SERPL-SCNC: 4.6 MMOL/L (ref 3.5–5.1)
PROT SERPL-MCNC: 7.5 G/DL (ref 6.3–8.2)
PROT UR STRIP-MCNC: NEGATIVE MG/DL
RBC # BLD AUTO: 4.59 M/UL (ref 4.05–5.2)
SODIUM SERPL-SCNC: 138 MMOL/L (ref 136–145)
SP GR UR REFRACTOMETRY: 1.03 (ref 1–1.02)
UROBILINOGEN UR QL STRIP.AUTO: 1 EU/DL (ref 0.2–1)
WBC # BLD AUTO: 6.2 K/UL (ref 4.3–11.1)

## 2025-03-19 PROCEDURE — 84703 CHORIONIC GONADOTROPIN ASSAY: CPT

## 2025-03-19 PROCEDURE — 81025 URINE PREGNANCY TEST: CPT

## 2025-03-19 PROCEDURE — 85025 COMPLETE CBC W/AUTO DIFF WBC: CPT

## 2025-03-19 PROCEDURE — 83690 ASSAY OF LIPASE: CPT

## 2025-03-19 PROCEDURE — 80053 COMPREHEN METABOLIC PANEL: CPT

## 2025-03-19 PROCEDURE — 6360000004 HC RX CONTRAST MEDICATION: Performed by: PHYSICIAN ASSISTANT

## 2025-03-19 PROCEDURE — 81003 URINALYSIS AUTO W/O SCOPE: CPT

## 2025-03-19 PROCEDURE — 99285 EMERGENCY DEPT VISIT HI MDM: CPT

## 2025-03-19 PROCEDURE — 74177 CT ABD & PELVIS W/CONTRAST: CPT

## 2025-03-19 RX ORDER — IOPAMIDOL 755 MG/ML
100 INJECTION, SOLUTION INTRAVASCULAR
Status: COMPLETED | OUTPATIENT
Start: 2025-03-19 | End: 2025-03-19

## 2025-03-19 RX ORDER — KETOROLAC TROMETHAMINE 30 MG/ML
30 INJECTION, SOLUTION INTRAMUSCULAR; INTRAVENOUS
Status: DISCONTINUED | OUTPATIENT
Start: 2025-03-19 | End: 2025-03-20 | Stop reason: HOSPADM

## 2025-03-19 RX ADMIN — IOPAMIDOL 100 ML: 755 INJECTION, SOLUTION INTRAVENOUS at 20:52

## 2025-03-19 NOTE — ED TRIAGE NOTES
Pt. A/ox4 and ambulatory to triage. Pt. Lower right back pain that radiates to lower abdomen starting this morning.

## 2025-03-20 NOTE — DISCHARGE INSTRUCTIONS
Use daily laxative or stool softeners to help increase bowel movements.  See your primary care physician for routine recheck return to ER for any worsening symptom

## 2025-03-20 NOTE — ED PROVIDER NOTES
Emergency Department Provider Note       PCP: BRADY Nogueira MD   Age: 39 y.o.   Sex: female     DISPOSITION Decision To Discharge 03/19/2025 09:43:18 PM   DISPOSITION CONDITION Stable            ICD-10-CM    1. Lower abdominal pain  R10.30       2. Constipation, unspecified constipation type  K59.00           Medical Decision Making     39-year-old female with suprapubic area pain started today also some pain in the mid lower back.  No change in activity no fever no urinary symptoms no nausea or vomiting.  CBC CMP was normal lipase normal urinalysis negative.  CT scan of the abdomen shows some constipation otherwise negative.  Patient was given 30 of Toradol IV during her ER visit for some cramping she was advised to use over-the-counter stool softeners or laxatives of choice.  Push her fluids increase fruits and vegetables see primary care for recheck return for any worsening symptoms no evidence of any surgical or infection process at this time     1 or more acute illnesses that pose a threat to life or bodily function.   Shared medical decision making was utilized in creating the patients health plan today.    I independently ordered and reviewed each unique test.  I reviewed external records: provider visit note from outside specialist.     I interpreted the CT Scan CT abdomen pelvis read by radiologist.  I interpreted the CBC CMP urinalysis pregnancy test lipase.              History     39 female to ER complaining of lower abdominal pain and low back pain started today.  States yesterday she felt fine.  She has had no fever no nausea vomiting or urinary symptoms.  She has not taken any medicine for her pain          ROS     Review of Systems   All other systems reviewed and are negative.       Physical Exam     Vitals signs and nursing note reviewed:  Vitals:    03/19/25 1915 03/19/25 1916   BP: 121/81    Pulse: 94    Resp: 16    Temp: 98 °F (36.7 °C)    TempSrc: Oral    SpO2: 99%    Weight:  72.6 kg

## 2025-03-26 ENCOUNTER — TELEPHONE (OUTPATIENT)
Dept: OBGYN CLINIC | Age: 40
End: 2025-03-26

## 2025-03-26 ENCOUNTER — OFFICE VISIT (OUTPATIENT)
Dept: OBGYN CLINIC | Age: 40
End: 2025-03-26
Payer: COMMERCIAL

## 2025-03-26 VITALS
SYSTOLIC BLOOD PRESSURE: 122 MMHG | WEIGHT: 184 LBS | HEIGHT: 65 IN | DIASTOLIC BLOOD PRESSURE: 68 MMHG | BODY MASS INDEX: 30.66 KG/M2

## 2025-03-26 DIAGNOSIS — N94.9 UTERINE TENDERNESS: ICD-10-CM

## 2025-03-26 DIAGNOSIS — R10.2 VAGINAL PAIN: Primary | ICD-10-CM

## 2025-03-26 DIAGNOSIS — N89.8 VAGINAL DISCHARGE: ICD-10-CM

## 2025-03-26 DIAGNOSIS — R10.30 LOWER ABDOMINAL PAIN: ICD-10-CM

## 2025-03-26 PROCEDURE — 99215 OFFICE O/P EST HI 40 MIN: CPT | Performed by: OBSTETRICS & GYNECOLOGY

## 2025-03-26 RX ORDER — METRONIDAZOLE 500 MG/1
500 TABLET ORAL 2 TIMES DAILY
Qty: 14 TABLET | Refills: 0 | Status: SHIPPED | OUTPATIENT
Start: 2025-03-26 | End: 2025-04-02

## 2025-03-26 SDOH — ECONOMIC STABILITY: FOOD INSECURITY: WITHIN THE PAST 12 MONTHS, YOU WORRIED THAT YOUR FOOD WOULD RUN OUT BEFORE YOU GOT MONEY TO BUY MORE.: NEVER TRUE

## 2025-03-26 SDOH — ECONOMIC STABILITY: FOOD INSECURITY: WITHIN THE PAST 12 MONTHS, THE FOOD YOU BOUGHT JUST DIDN'T LAST AND YOU DIDN'T HAVE MONEY TO GET MORE.: NEVER TRUE

## 2025-03-26 NOTE — TELEPHONE ENCOUNTER
Pt LVM stating that she was having pelvic pain following a miscarriage. Pt has an office appointment scheduled for tomorrow. Advised pt that if her pain is severe she should go to E ED to be seen today. Pt verbalized understanding.

## 2025-03-26 NOTE — PROGRESS NOTES
3/26/2025      Kelsi Andujar  : 1985  39 y.o.      HPI: work in scheduled by office today. She called saying her cervix hurts after miscarriage. She had appt with Brittany roberts. Came today b/c wants to be seen asap.     Emr review shows she had a neg blood and urine preg test in ER on 3-19.   Also NL cbc, cmp, lipase, neg UA  I dont see that std screen was done  Ct of abd and pelvis showed constipation. O/w neg. I reviewed the films.    She told my rooming nurse today: Pt states she had positive UPT at home on . LMP approximately . States symptoms of miscarriage started on 3/1     Pt tells me she had miscarriage 3-1. Had +hpt. Then had bldg heavy and stuff came out- not normal period. Had appt here for preg conf but canceled it after the miscarriage.   Pt says pelvic exam wasn't done in ER. She wants pelvic exam     C/o d/c- clear, stretchy, thick, no odor. +chills this am. Cked her temp and it was 37 deg C. Says Dr Byers gave her abx in past for infxn after miscarriage. Wonders if she has same thing going on.   More review of emr. Found in , [place] treated and tested for pid. All testing was neg.  GI fxn NL. NL appetite. . Denies concerns about std exposure. Also says she is not constipated b/c she goes every day.       Exam:  /68   Ht 1.651 m (5' 5\")   Wt 83.5 kg (184 lb)   Breastfeeding Yes   BMI 30.62 kg/m²     Body mass index is 30.62 kg/m².    Pt in no distress. Alert and oriented x3. Affect bright.  Well developed, well nourished.  HEENT: normocephalic, atraumatic. Sclerae nonicteric.  Abdomen:  Pts to suprapubic area as the area of her pain.  Pelvic: normal external genitalia, urethral meatus, urethra, vagina and cervix. Slightly increased d/c. No redness or odor. Collected general cxs and nuswab. Pt had no pain with this. Bimanual exam w/o masses. No pain with touching cx. Does c/ o mild pain with movement of cx and with palp of uterus. Also palp

## 2025-03-29 LAB
A VAGINAE DNA VAG QL NAA+PROBE: NORMAL SCORE
BACTERIAL VAGINOSIS, NAA: NORMAL
BVAB2 DNA VAG QL NAA+PROBE: NORMAL SCORE
C ALBICANS DNA VAG QL NAA+PROBE: NEGATIVE
C GLABRATA DNA VAG QL NAA+PROBE: NEGATIVE
CANDIDA KRUSEI: NEGATIVE
CANDIDA LUSITANIAE, NAA: NEGATIVE
CANDIDA PARAPSILOSIS/TROPICALIS: NEGATIVE
MEGA1 DNA VAG QL NAA+PROBE: NORMAL SCORE
SPECIMEN SOURCE: NORMAL
T VAGINALIS RRNA SPEC QL NAA+PROBE: NEGATIVE

## 2025-03-30 LAB
BACTERIA SPEC CULT: NORMAL
GRAM STN SPEC: NORMAL
GRAM STN SPEC: NORMAL
SERVICE CMNT-IMP: NORMAL

## 2025-03-31 ENCOUNTER — RESULTS FOLLOW-UP (OUTPATIENT)
Dept: OBGYN CLINIC | Age: 40
End: 2025-03-31

## 2025-03-31 ENCOUNTER — PATIENT MESSAGE (OUTPATIENT)
Dept: OBGYN CLINIC | Age: 40
End: 2025-03-31

## 2025-05-02 ENCOUNTER — OFFICE VISIT (OUTPATIENT)
Dept: OBGYN CLINIC | Age: 40
End: 2025-05-02
Payer: COMMERCIAL

## 2025-05-02 ENCOUNTER — PROCEDURE VISIT (OUTPATIENT)
Dept: OBGYN CLINIC | Age: 40
End: 2025-05-02
Payer: COMMERCIAL

## 2025-05-02 VITALS — DIASTOLIC BLOOD PRESSURE: 60 MMHG | WEIGHT: 182 LBS | SYSTOLIC BLOOD PRESSURE: 110 MMHG | BODY MASS INDEX: 30.29 KG/M2

## 2025-05-02 DIAGNOSIS — N92.6 MISSED MENSES: Primary | ICD-10-CM

## 2025-05-02 DIAGNOSIS — O26.841 UTERINE SIZE DATE DISCREPANCY PREGNANCY, FIRST TRIMESTER: ICD-10-CM

## 2025-05-02 DIAGNOSIS — O20.0 THREATENED MISCARRIAGE IN EARLY PREGNANCY: ICD-10-CM

## 2025-05-02 PROCEDURE — 99213 OFFICE O/P EST LOW 20 MIN: CPT | Performed by: OBSTETRICS & GYNECOLOGY

## 2025-05-02 PROCEDURE — 76830 TRANSVAGINAL US NON-OB: CPT | Performed by: OBSTETRICS & GYNECOLOGY

## 2025-05-02 RX ORDER — UREA 10 %
1 LOTION (ML) TOPICAL 2 TIMES DAILY
Qty: 60 TABLET | Refills: 4 | Status: SHIPPED | OUTPATIENT
Start: 2025-05-02 | End: 2025-05-05 | Stop reason: SDUPTHER

## 2025-05-02 RX ORDER — ONDANSETRON 4 MG/1
4 TABLET, FILM COATED ORAL DAILY PRN
Qty: 30 TABLET | Refills: 0 | Status: SHIPPED | OUTPATIENT
Start: 2025-05-02

## 2025-05-02 NOTE — PROGRESS NOTES
Patient presents today for   Chief Complaint   Patient presents with    Ultrasound    Amenorrhea       LMP: No LMP recorded. (Menstrual status: Breastfeeding).  Contraception: none        No Known Allergies  Current Outpatient Medications   Medication Sig Dispense Refill    ondansetron (ZOFRAN) 4 MG tablet Take 1 tablet by mouth daily as needed for Nausea or Vomiting 30 tablet 0    lidocaine (XYLOCAINE) 2 % jelly Apply topically as needed. 28.33 g 2    PRENATAL VIT-DOCUSATE-IRON-FA PO Take by mouth      Pyridoxine HCl (VITAMIN B6) 50 MG TABS Take 1 tablet by mouth in the morning and at bedtime 180 tablet 1     No current facility-administered medications for this visit.     Past Medical History:   Diagnosis Date    Advanced maternal age in multigravida     Anemia 2020    COVID-19 2022    Grand multipara     History of classical  section     History of delivery of macrosomal infant     born 9 lb 6.4 oz at 41w5d    History of  premature rupture of membranes (PPROM)     at 33w0d    Palpitations 11/10/2015    Uterine fibroid      Past Surgical History:   Procedure Laterality Date     SECTION N/A 2023     SECTION [place] [facility] [place]    WISDOM TOOTH EXTRACTION       Social History     Socioeconomic History    Marital status:      Spouse name: Not on file    Number of children: Not on file    Years of education: Not on file    Highest education level: Not on file   Occupational History    Not on file   Tobacco Use    Smoking status: Never     Passive exposure: Never    Smokeless tobacco: Never   Vaping Use    Vaping status: Never Used   Substance and Sexual Activity    Alcohol use: No    Drug use: No    Sexual activity: Yes     Partners: Male     Birth control/protection: None   Other Topics Concern    Not on file   Social History Narrative    Not on file     Social Drivers of Health     Financial Resource Strain: Low Risk  (3/1/2023)

## 2025-05-05 RX ORDER — LANOLIN ALCOHOL/MO/W.PET/CERES
CREAM (GRAM) TOPICAL
Qty: 180 TABLET | Refills: 2 | OUTPATIENT
Start: 2025-05-05

## 2025-05-05 RX ORDER — UREA 10 %
1 LOTION (ML) TOPICAL 2 TIMES DAILY
Qty: 180 TABLET | Refills: 1 | Status: SHIPPED | OUTPATIENT
Start: 2025-05-05

## 2025-05-09 ENCOUNTER — OFFICE VISIT (OUTPATIENT)
Dept: OBGYN CLINIC | Age: 40
End: 2025-05-09
Payer: COMMERCIAL

## 2025-05-09 VITALS
BODY MASS INDEX: 30.16 KG/M2 | WEIGHT: 181 LBS | SYSTOLIC BLOOD PRESSURE: 118 MMHG | HEIGHT: 65 IN | DIASTOLIC BLOOD PRESSURE: 64 MMHG

## 2025-05-09 DIAGNOSIS — Z34.91 FIRST TRIMESTER PREGNANCY: ICD-10-CM

## 2025-05-09 DIAGNOSIS — O30.001: ICD-10-CM

## 2025-05-09 DIAGNOSIS — Z3A.08 8 WEEKS GESTATION OF PREGNANCY: ICD-10-CM

## 2025-05-09 DIAGNOSIS — O09.299 HISTORY OF MACROSOMIA IN INFANT IN PRIOR PREGNANCY, CURRENTLY PREGNANT: ICD-10-CM

## 2025-05-09 DIAGNOSIS — Z87.59 HISTORY OF PRETERM PREMATURE RUPTURE OF MEMBRANES (PPROM): ICD-10-CM

## 2025-05-09 DIAGNOSIS — O30.041 TWIN PREGNANCY, DICHORIONIC/DIAMNIOTIC, FIRST TRIMESTER: ICD-10-CM

## 2025-05-09 DIAGNOSIS — O36.5910: ICD-10-CM

## 2025-05-09 DIAGNOSIS — Z11.3 SCREENING FOR STD (SEXUALLY TRANSMITTED DISEASE): ICD-10-CM

## 2025-05-09 DIAGNOSIS — O09.521 AMA (ADVANCED MATERNAL AGE) MULTIGRAVIDA 35+, FIRST TRIMESTER: Primary | ICD-10-CM

## 2025-05-09 DIAGNOSIS — Z98.891 HISTORY OF CLASSICAL CESAREAN SECTION: ICD-10-CM

## 2025-05-09 LAB
ABO + RH BLD: NORMAL
BASOPHILS # BLD: 0.07 K/UL (ref 0–0.2)
BASOPHILS NFR BLD: 0.8 % (ref 0–2)
BLOOD GROUP ANTIBODIES SERPL: NORMAL
DIFFERENTIAL METHOD BLD: NORMAL
EOSINOPHIL # BLD: 0.07 K/UL (ref 0–0.8)
EOSINOPHIL NFR BLD: 0.8 % (ref 0.5–7.8)
ERYTHROCYTE [DISTWIDTH] IN BLOOD BY AUTOMATED COUNT: 12.3 % (ref 11.9–14.6)
EST. AVERAGE GLUCOSE BLD GHB EST-MCNC: 115 MG/DL
HBA1C MFR BLD: 5.7 % (ref 0–5.6)
HBV SURFACE AG SER QL: NONREACTIVE
HCT VFR BLD AUTO: 40.5 % (ref 35.8–46.3)
HCV AB SER QL: NONREACTIVE
HGB BLD-MCNC: 13.8 G/DL (ref 11.7–15.4)
HIV 1+2 AB+HIV1 P24 AG SERPL QL IA: NONREACTIVE
HIV 1/2 RESULT COMMENT: NORMAL
IMM GRANULOCYTES # BLD AUTO: 0.35 K/UL (ref 0–0.5)
IMM GRANULOCYTES NFR BLD AUTO: 3.8 % (ref 0–5)
LYMPHOCYTES # BLD: 1.42 K/UL (ref 0.5–4.6)
LYMPHOCYTES NFR BLD: 15.4 % (ref 13–44)
MCH RBC QN AUTO: 30.3 PG (ref 26.1–32.9)
MCHC RBC AUTO-ENTMCNC: 34.1 G/DL (ref 31.4–35)
MCV RBC AUTO: 88.8 FL (ref 82–102)
MONOCYTES # BLD: 0.74 K/UL (ref 0.1–1.3)
MONOCYTES NFR BLD: 8 % (ref 4–12)
NEUTS SEG # BLD: 6.56 K/UL (ref 1.7–8.2)
NEUTS SEG NFR BLD: 71.2 % (ref 43–78)
NRBC # BLD: 0 K/UL (ref 0–0.2)
PLATELET # BLD AUTO: 299 K/UL (ref 150–450)
PMV BLD AUTO: 11.4 FL (ref 9.4–12.3)
RBC # BLD AUTO: 4.56 M/UL (ref 4.05–5.2)
RUBV IGG SERPL IA-ACNC: 54.6 IU/ML
T PALLIDUM AB SER QL IA: NONREACTIVE
WBC # BLD AUTO: 9.2 K/UL (ref 4.3–11.1)

## 2025-05-09 PROCEDURE — 99213 OFFICE O/P EST LOW 20 MIN: CPT | Performed by: OBSTETRICS & GYNECOLOGY

## 2025-05-09 NOTE — PROGRESS NOTES
Patient presents today for   Chief Complaint   Patient presents with    Ultrasound    Amenorrhea     Kelsi is seen for follow up with early twin gestation, however, baby B was measuring significantly smaller on ultrasound 25 and only had questionable FCA. Heather denies any vaginal bleeding or major cramping since that time.     LMP: Patient's last menstrual period was 2023.  Contraception: none        No Known Allergies  Current Outpatient Medications   Medication Sig Dispense Refill    Pyridoxine HCl (VITAMIN B6) 50 MG TABS Take 1 tablet by mouth in the morning and at bedtime 180 tablet 1    ondansetron (ZOFRAN) 4 MG tablet Take 1 tablet by mouth daily as needed for Nausea or Vomiting 30 tablet 0    PRENATAL VIT-DOCUSATE-IRON-FA PO Take by mouth      lidocaine (XYLOCAINE) 2 % jelly Apply topically as needed. 28.33 g 2     No current facility-administered medications for this visit.     Past Medical History:   Diagnosis Date    Advanced maternal age in multigravida     Anemia 2020    COVID-19 2022    Grand multipara     History of delivery of macrosomal infant 2017    born 9 lb 6.4 oz at 41w5d    History of  premature rupture of membranes (PPROM)     at 33w0d    Uterine fibroid      Past Surgical History:   Procedure Laterality Date     SECTION N/A 2023     SECTION performed by Hamzah Petersen MD at [facility] [place] [facility]    WISDOM TOOTH EXTRACTION       Social History     Socioeconomic History    Marital status:      Spouse name: Not on file    Number of children: Not on file    Years of education: Not on file    Highest education level: Not on file   Occupational History    Not on file   Tobacco Use    Smoking status: Never     Passive exposure: Never    Smokeless tobacco: Never   Vaping Use    Vaping status: Never Used   Substance and Sexual Activity    Alcohol use: No    Drug use: No    Sexual activity: Yes     Partners: Male     Birth control/protection: None   Other

## 2025-05-12 PROBLEM — O09.529 ANTEPARTUM MULTIGRAVIDA OF ADVANCED MATERNAL AGE: Status: ACTIVE | Noted: 2025-05-12

## 2025-05-12 PROBLEM — Z98.891 HISTORY OF CLASSICAL CESAREAN SECTION: Status: ACTIVE | Noted: 2025-05-12

## 2025-05-12 PROBLEM — O09.299 HISTORY OF MACROSOMIA IN INFANT IN PRIOR PREGNANCY, CURRENTLY PREGNANT: Status: ACTIVE | Noted: 2025-05-12

## 2025-05-12 PROBLEM — Z86.2 HISTORY OF ANEMIA: Status: ACTIVE | Noted: 2025-05-12

## 2025-05-12 PROBLEM — O41.8X20 SUBCHORIONIC HEMATOMA IN SECOND TRIMESTER: Status: RESOLVED | Noted: 2023-03-01 | Resolved: 2025-05-12

## 2025-05-12 PROBLEM — Z86.2 HISTORY OF ANEMIA: Status: RESOLVED | Noted: 2020-02-25 | Resolved: 2025-05-12

## 2025-05-12 PROBLEM — O46.8X2 SUBCHORIONIC HEMATOMA IN SECOND TRIMESTER: Status: RESOLVED | Noted: 2023-03-01 | Resolved: 2025-05-12

## 2025-05-12 PROBLEM — Z98.891 HISTORY OF CLASSICAL CESAREAN SECTION: Status: RESOLVED | Noted: 2023-07-12 | Resolved: 2025-05-12

## 2025-05-12 PROBLEM — Z64.1 GRAND MULTIPARA: Status: ACTIVE | Noted: 2025-05-12

## 2025-05-12 PROBLEM — O09.299 HISTORY OF MACROSOMIA IN INFANT IN PRIOR PREGNANCY, CURRENTLY PREGNANT: Status: RESOLVED | Noted: 2019-05-13 | Resolved: 2025-05-12

## 2025-05-12 PROBLEM — Z87.59 HISTORY OF PRETERM PREMATURE RUPTURE OF MEMBRANES (PPROM): Status: ACTIVE | Noted: 2025-05-12

## 2025-05-12 PROBLEM — O09.41 SUPERVISION OF HIGH-RISK PREGNANCY WITH GRAND MULTIPARITY IN FIRST TRIMESTER: Status: RESOLVED | Noted: 2023-01-22 | Resolved: 2025-05-12

## 2025-05-12 PROBLEM — O42.919 PRETERM PREMATURE RUPTURE OF MEMBRANES (PPROM) WITH UNKNOWN ONSET OF LABOR: Status: RESOLVED | Noted: 2023-07-11 | Resolved: 2025-05-12

## 2025-05-12 PROBLEM — O09.521 MULTIGRAVIDA OF ADVANCED MATERNAL AGE IN FIRST TRIMESTER: Status: RESOLVED | Noted: 2023-01-22 | Resolved: 2025-05-12

## 2025-05-12 PROBLEM — D62 ABLA (ACUTE BLOOD LOSS ANEMIA): Status: RESOLVED | Noted: 2023-07-12 | Resolved: 2025-05-12

## 2025-05-12 LAB
BACTERIA SPEC CULT: NORMAL
SERVICE CMNT-IMP: NORMAL

## 2025-05-19 NOTE — PROGRESS NOTES
NOB consult with patient. Labs today: PC ratio. Offered pt the option of CF, SMA, and Fragile X carrier testing. Patient has declined testing in previous pregnancies. Pt declines testing. Offered pt the option of genetic screening (1st screen vs Tetra vs NIPT). Pt declines. Instructed pt on exercise/nutrition in pregnancy. Reviewed [facility] preg book. Advised pt on using [facility] for hospital needs and [facility] L&D for pregnancy related emergencies. IMPACT[employer] Program discussed with patient and sheet given to her during visit today. Encouraged her to inform us should she start having increase in depression/anxiety. Pt states understanding. NOB forms signed, scanned, and given to pt.      Vitals:  Weight: 182 lb  BMI: 30     Medical Hx: Advanced maternal age () will be 40 at delivery.  Twin gestation ().  Grand multip.  Antepartum anemia ().  PPROM () at 33w0d.  Macrosomia () born 9 lb 6.4 oz at 41w5d.  Pregnancy with cystic hygroma () MAB.  Uterine fibroid.     Surgical Hx:  section () breech. - [place] tooth extraction.     Last Pap: 10/2024 WNL     Pt OB c/o: Patient's HgA1c was 5.7 with initial prenatal labs. Needs scheduled for early 2 hr gtt.      Fam hx any chromosomal or inheritable disorders: Previous pregnancy in 2019 was MAD at 10w6d with cystic hygroma.     COVID Vaccine: x0 per patient  Flu Vaccine: Discussed flu recommendations with patient. Patient declines flu vaccine.     OB hx: G1: 2008, Term, Vag-Spont, Female, 7 lb 7 oz, Living.              G2: 2010, Term, Vag-Spont, Male, 8 lb 0 oz, Living.              G3: 2012, 42w0d, Vag-Spont, Female, 10 lb 0 oz, Living.              G4: 2015, Term, Vag-Spont, Male, 7 lb 9 oz, Living.              G5: 2017, 41w5d, Vag-Spont, Female, 4.26 kg (9 lb 6 oz), Living. Macrosomia.              G6: 2019, 10w6d, MAB. Cystic hygroma.              G7: 2020, 39w0d, Vag-Spont, Female, 3.87 kg (8 lb 8 oz), Living.

## 2025-05-28 ENCOUNTER — CLINICAL SUPPORT (OUTPATIENT)
Dept: OBGYN CLINIC | Age: 40
End: 2025-05-28

## 2025-05-28 VITALS — HEIGHT: 65 IN | BODY MASS INDEX: 30.32 KG/M2 | WEIGHT: 182 LBS

## 2025-05-28 DIAGNOSIS — Z3A.11 11 WEEKS GESTATION OF PREGNANCY: ICD-10-CM

## 2025-05-28 DIAGNOSIS — O09.529 ANTEPARTUM MULTIGRAVIDA OF ADVANCED MATERNAL AGE: Primary | ICD-10-CM

## 2025-05-28 DIAGNOSIS — Z86.2 HISTORY OF ANEMIA: ICD-10-CM

## 2025-05-28 DIAGNOSIS — Z84.89 FAMILY HISTORY OF CYSTIC HYGROMA: ICD-10-CM

## 2025-05-28 DIAGNOSIS — Z87.59 HISTORY OF PRETERM PREMATURE RUPTURE OF MEMBRANES (PPROM): ICD-10-CM

## 2025-05-28 DIAGNOSIS — O30.041 TWIN PREGNANCY, DICHORIONIC/DIAMNIOTIC, FIRST TRIMESTER: ICD-10-CM

## 2025-05-28 DIAGNOSIS — O09.299 HISTORY OF MACROSOMIA IN INFANT IN PRIOR PREGNANCY, CURRENTLY PREGNANT: ICD-10-CM

## 2025-05-28 DIAGNOSIS — Z98.891 HISTORY OF CLASSICAL CESAREAN SECTION: ICD-10-CM

## 2025-05-28 DIAGNOSIS — O30.001: ICD-10-CM

## 2025-05-28 DIAGNOSIS — Z64.1 GRAND MULTIPARA: ICD-10-CM

## 2025-05-28 DIAGNOSIS — O36.5910: ICD-10-CM

## 2025-05-28 LAB
CREAT UR-MCNC: 161 MG/DL (ref 28–217)
PROT UR-MCNC: 18 MG/DL
PROT/CREAT UR-RTO: 0.1

## 2025-06-04 PROBLEM — O99.810 PREDIABETES IN MOTHER DURING PREGNANCY: Status: ACTIVE | Noted: 2025-06-04

## 2025-06-04 PROBLEM — O09.521 HIGH RISK PREGNANCY, MULTIGRAVIDA OF ADVANCED MATERNAL AGE IN FIRST TRIMESTER: Status: ACTIVE | Noted: 2025-05-12

## 2025-06-04 PROBLEM — O09.521 HIGH RISK PREGNANCY, MULTIGRAVIDA OF ADVANCED MATERNAL AGE IN FIRST TRIMESTER: Status: ACTIVE | Noted: 2025-06-04

## 2025-06-04 PROBLEM — O99.211 OBESITY AFFECTING PREGNANCY IN FIRST TRIMESTER: Status: ACTIVE | Noted: 2025-06-04

## 2025-06-04 PROBLEM — O31.20X0 TWIN PREGNANCY WITH SINGLE INTRAUTERINE DEATH: Status: ACTIVE | Noted: 2025-05-12

## 2025-06-06 ENCOUNTER — ROUTINE PRENATAL (OUTPATIENT)
Dept: OBGYN CLINIC | Age: 40
End: 2025-06-06

## 2025-06-06 VITALS — WEIGHT: 179 LBS | BODY MASS INDEX: 29.79 KG/M2

## 2025-06-06 DIAGNOSIS — O09.299 HISTORY OF MACROSOMIA IN INFANT IN PRIOR PREGNANCY, CURRENTLY PREGNANT: ICD-10-CM

## 2025-06-06 DIAGNOSIS — O09.521 HIGH RISK PREGNANCY, MULTIGRAVIDA OF ADVANCED MATERNAL AGE IN FIRST TRIMESTER: Primary | ICD-10-CM

## 2025-06-06 DIAGNOSIS — Z98.891 HISTORY OF CLASSICAL CESAREAN SECTION: ICD-10-CM

## 2025-06-06 DIAGNOSIS — Z87.59 HISTORY OF PRETERM PREMATURE RUPTURE OF MEMBRANES (PPROM): ICD-10-CM

## 2025-06-06 DIAGNOSIS — Z84.89 FAMILY HISTORY OF CYSTIC HYGROMA: ICD-10-CM

## 2025-06-06 DIAGNOSIS — Z86.2 HISTORY OF ANEMIA: ICD-10-CM

## 2025-06-06 DIAGNOSIS — Z64.1 GRAND MULTIPARA: ICD-10-CM

## 2025-06-06 PROCEDURE — 0501F PRENATAL FLOW SHEET: CPT | Performed by: OBSTETRICS & GYNECOLOGY

## 2025-06-06 NOTE — PROGRESS NOTES
Patient presents today for   Chief Complaint   Patient presents with    Initial Prenatal Visit     No VB or major cramping.     LMP: Patient's last menstrual period was 2023.        No Known Allergies  Current Outpatient Medications   Medication Sig Dispense Refill    Pyridoxine HCl (VITAMIN B6) 50 MG TABS Take 1 tablet by mouth in the morning and at bedtime 180 tablet 1    ondansetron (ZOFRAN) 4 MG tablet Take 1 tablet by mouth daily as needed for Nausea or Vomiting 30 tablet 0    PRENATAL VIT-DOCUSATE-IRON-FA PO Take by mouth      blood glucose monitor strips Test 4 times a day & as needed for symptoms of irregular blood glucose. Dispense sufficient amount for indicated testing frequency plus additional to accommodate PRN testing needs. Device: OneTouch Verio 200 strip 1    blood glucose monitor strips Test 4 times a day & as needed for symptoms of irregular blood glucose. Dispense sufficient amount for indicated testing frequency plus additional to accommodate PRN testing needs. Device: OneTouch Verio 200 strip 5    Lancets MISC 1 each by Does not apply route daily Use as directed to check sugars fasting and 1 hour after the first bite of each meal 4x daily (breakfast, lunch, dinner) 200 each 4     No current facility-administered medications for this visit.     Past Medical History:   Diagnosis Date    Advanced maternal age in multigravida     Anemia 2020    COVID-19 2022    Grand multipara     History of delivery of macrosomal infant 2017    born 9 lb 6.4 oz at 41w5d    History of  premature rupture of membranes (PPROM)     at 33w0d    Uterine fibroid      Past Surgical History:   Procedure Laterality Date     SECTION N/A 2023     SECTION performed by Hamzah Petersen MD at [facility] [place] [facility]    WISDOM TOOTH EXTRACTION       Social History     Socioeconomic History    Marital status:      Spouse name: Not on file    Number of children: Not on file    Years of

## 2025-06-10 ENCOUNTER — ROUTINE PRENATAL (OUTPATIENT)
Dept: OBGYN CLINIC | Age: 40
End: 2025-06-10
Payer: COMMERCIAL

## 2025-06-10 VITALS — DIASTOLIC BLOOD PRESSURE: 72 MMHG | SYSTOLIC BLOOD PRESSURE: 106 MMHG

## 2025-06-10 DIAGNOSIS — Z64.1 GRAND MULTIPARA: ICD-10-CM

## 2025-06-10 DIAGNOSIS — O99.211 OBESITY AFFECTING PREGNANCY IN FIRST TRIMESTER, UNSPECIFIED OBESITY TYPE: ICD-10-CM

## 2025-06-10 DIAGNOSIS — Z86.2 HISTORY OF ANEMIA: ICD-10-CM

## 2025-06-10 DIAGNOSIS — O09.299 HISTORY OF MACROSOMIA IN INFANT IN PRIOR PREGNANCY, CURRENTLY PREGNANT: ICD-10-CM

## 2025-06-10 DIAGNOSIS — Z87.59 HISTORY OF PRETERM PREMATURE RUPTURE OF MEMBRANES (PPROM): ICD-10-CM

## 2025-06-10 DIAGNOSIS — Z98.891 HISTORY OF CLASSICAL CESAREAN SECTION: ICD-10-CM

## 2025-06-10 DIAGNOSIS — O31.21X1 TWIN PREGNANCY WITH SINGLE INTRAUTERINE DEATH IN FIRST TRIMESTER, FETUS 1 OF MULTIPLE GESTATION: ICD-10-CM

## 2025-06-10 DIAGNOSIS — O99.810 PREDIABETES IN MOTHER DURING PREGNANCY: ICD-10-CM

## 2025-06-10 DIAGNOSIS — O09.521 HIGH RISK PREGNANCY, MULTIGRAVIDA OF ADVANCED MATERNAL AGE IN FIRST TRIMESTER: Primary | ICD-10-CM

## 2025-06-10 DIAGNOSIS — Z84.89 FAMILY HISTORY OF CYSTIC HYGROMA: ICD-10-CM

## 2025-06-10 PROCEDURE — 76801 OB US < 14 WKS SINGLE FETUS: CPT | Performed by: OBSTETRICS & GYNECOLOGY

## 2025-06-10 PROCEDURE — 76813 OB US NUCHAL MEAS 1 GEST: CPT | Performed by: OBSTETRICS & GYNECOLOGY

## 2025-06-10 PROCEDURE — 99205 OFFICE O/P NEW HI 60 MIN: CPT | Performed by: OBSTETRICS & GYNECOLOGY

## 2025-06-10 NOTE — PROGRESS NOTES
lactation progresses over time. Careful monitoring and adequate supplementation is therefore needed for infants at risk.    Anemia evaluation in pregnancy should evaluate hemoglobinopathies (including alpha thal, beta thal, sickle cell disease), iron deficiency, folate deficiency, B12 deficiency. Additionally, physiologic anemia must be considered as sole or contributing cause. Other less common etiologies may also be present (autoimmune, red cell disorders, G6PD deficiencies etc).  Treatment then is directed to appropriate etiology.         Family history of cystic hygroma 02/28/2023     Overview Note:     G6 6/11/2019 MAB at 10w6d w/ Cystic hygroma  (NIPT low risk); no D&C required         Return in about 7 weeks (around 7/29/2025) for Anatomy & Echo.     Jasvir Arauz MD   An electronic signature was used to authenticate this note.    I have spent 61 minutes reviewing previous notes, test results and face to face with the patient discussing the diagnosis and importance of compliance with the treatment plan, as well as documenting on the day of the visit 06/10/2025. Normal ultrasound findings are not included in this time calculation. Full ultrasound data in ultrasound report. Limited ultrasound data included in office consult note.

## 2025-06-11 RX ORDER — AVOBENZONE, HOMOSALATE, OCTISALATE, OCTOCRYLENE 30; 40; 45; 26 MG/ML; MG/ML; MG/ML; MG/ML
1 CREAM TOPICAL DAILY
Qty: 200 EACH | Refills: 4 | Status: SHIPPED | OUTPATIENT
Start: 2025-06-11

## 2025-06-11 RX ORDER — GLUCOSAMINE HCL/CHONDROITIN SU 500-400 MG
CAPSULE ORAL
Qty: 200 STRIP | Refills: 5 | Status: SHIPPED | OUTPATIENT
Start: 2025-06-11

## 2025-06-11 NOTE — ASSESSMENT & PLAN NOTE
Normal NT and nasal bone in viable Twin .  Genetic counseling done by MD.   NIPT not recommended as unable to distinguish source if abnormal DNA is identified.   Chorionicity is not clear at this time. While the risk to the surviving co-twin in a monochorionic pregnancy is clear when the death of one twin occurs in the second or third trimester, the risk with death of one twin in the first trimester is unclear. This risk is likely small.   Will evaluate at 20 weeks.  
Advanced Maternal Age (Maternal Age 35 or greater at ANDREA)    Although there is no standardized definition of AMA, one common definition is of maternal age above 35. While maternal age above 40 is considered very advanced maternal age (VAMA), and above 45, very late maternal age\extremely advanced maternal age (EAMA).    First and Second Trimester  The risk of fetal aneuploidy based on maternal age should be reviewed with patient either with physicians or genetic counselor, or both. Testing for fetal aneuploidy can be divided into invasive and non-invasive tests.   Invasive testing, which includes amniocentesis and chorionic villus sampling, is diagnostic.   Non-invasive testing of maternal blood (for either hormone levels or cell-free fetal DNA), with or without ultrasound examination, is a screening test and requires follow-up testing of patients with screen-positive results.  Given the increased risk of congenital anomalies in older women, a detailed second trimester ultrasound examination to assess for significant structural anomalies (particularly cardiac defects) is recommended.      Genetic counseling was performed by physician after reviewing patient's genetic history.  The patient's Down syndrome age associated risk, as well as, risks of additional aneuploidy and genetic syndromes, are reduced by approximately 50% with normal anatomy ultrasounds in 1st/2nd trimester. Ultrasound alone does not rule out all abnormalities of genetics and development.     Maternal serum screening for aneuploidy was discussed with the patient including first trimester non-invasive prenatal testing (NIPT) for aneuploidy from a maternal blood sample.  Positive predictive and negative predictive values for these tests were explained, questions answered. Patient understands that these are screening tests that only assesses risk for select abnormalities (trisomies 13, 18, and 21, and sex chromosome abnormalities (NIPT). Assessment of 
Early evaluation of insulin resistance with HgbA1c at initiation of care- if a1c > 5.5, then follow up with either \"2 step\" 1hr GCT/ 3hr GTT OR \"1 step\" 2hr GTT    
Preconception BMI >= 30 increases risk for pregnancy complications, including gestational diabetes, poor or accelerated fetal growth, hypertensive disorders of pregnancy, and abnormal labor progression. In addition, there is an increased risk of fetal demise, as well as congenital anomalies including neural tube defects, cardiac malformations, orofacial defects, and limb reduction abnormalities.     The risk for stillbirth increases with increasing obesity: class I obesity 1.3 [1.2-1.4], class II obesity 1.4 [1.3-1.6], class III obesity 1.9 [1.3-1.6]) and higher stillbirth risk in  obese women (1.9 [1.7-2.1]) than in  obese women (1.4 [1.3-1.5]). Among women with class III obesity (BMI >=40 kg/m2), the risk for stillbirth increased with advancing gestational age: 30 to 33 weeks, hazard and risk ratios 1.40 and 1.69, respectively; 37 to 39 weeks, hazard and risk ratios 3.20 and 2.95, respectively; and 40 to 42 weeks, hazard and risk ratios 3.30 and 8.95, respectively.           Recommend detailed first trimester ultrasound with NT at 12-13 weeks.   Recommend level II ultrasound for anatomy and fetal echo if prepregnancy BMI >30-35 based on AIUM guidelines.     Early evaluation of insulin resistance with HgbA1c at initiation of care- if a1c > 5.5, then follow up with either \"2 step\" 1hr GCT/ 3hr GTT OR \"1 step\" 2hr GTT  Closely monitor blood pressure for development/worsening of hypertensive disorders of pregnancy.    Weight Gain Goal: <15 pounds, it is ok to stay same weight or lose as long as baby growing well  Dietary choices- low carb fine, avoid extreme keto (goal >50-75gm carb/day); not to use intermittent/prolonged fasting without specific discussion with physician.     Continue activity/exercise     The American College of Obstetricians and Gynecologists (ACOG) recommends weekly  surveillance for fetal well-being, starting by 34 weeks and 0 days of gestation for women with a 
both language and motor development. Breastfeeding is usually protective, but not if the mother is iron deficient. It has been noted that iron levels in breast milk fall as lactation progresses over time. Careful monitoring and adequate supplementation is therefore needed for infants at risk.    Anemia evaluation in pregnancy should evaluate hemoglobinopathies (including alpha thal, beta thal, sickle cell disease), iron deficiency, folate deficiency, B12 deficiency. Additionally, physiologic anemia must be considered as sole or contributing cause. Other less common etiologies may also be present (autoimmune, red cell disorders, G6PD deficiencies etc).  Treatment then is directed to appropriate etiology.

## 2025-06-16 RX ORDER — GLUCOSAMINE HCL/CHONDROITIN SU 500-400 MG
CAPSULE ORAL
Qty: 200 STRIP | Refills: 5 | Status: SHIPPED | OUTPATIENT
Start: 2025-06-16 | End: 2025-06-16 | Stop reason: SDUPTHER

## 2025-06-16 RX ORDER — GLUCOSAMINE HCL/CHONDROITIN SU 500-400 MG
CAPSULE ORAL
Qty: 200 STRIP | Refills: 5 | Status: SHIPPED | OUTPATIENT
Start: 2025-06-16 | End: 2025-06-18 | Stop reason: SDUPTHER

## 2025-06-18 DIAGNOSIS — O99.810 PREDIABETES IN MOTHER DURING PREGNANCY: Primary | ICD-10-CM

## 2025-06-18 RX ORDER — GLUCOSAMINE HCL/CHONDROITIN SU 500-400 MG
CAPSULE ORAL
Qty: 200 STRIP | Refills: 5 | Status: SHIPPED | OUTPATIENT
Start: 2025-06-18

## 2025-06-23 DIAGNOSIS — O99.810 PREDIABETES IN MOTHER DURING PREGNANCY: Primary | ICD-10-CM

## 2025-06-23 RX ORDER — GLUCOSAMINE HCL/CHONDROITIN SU 500-400 MG
CAPSULE ORAL
Qty: 200 STRIP | Refills: 1 | Status: SHIPPED | OUTPATIENT
Start: 2025-06-23

## 2025-07-09 ENCOUNTER — ROUTINE PRENATAL (OUTPATIENT)
Dept: OBGYN CLINIC | Age: 40
End: 2025-07-09

## 2025-07-09 VITALS — DIASTOLIC BLOOD PRESSURE: 78 MMHG | WEIGHT: 183 LBS | SYSTOLIC BLOOD PRESSURE: 130 MMHG | BODY MASS INDEX: 30.45 KG/M2

## 2025-07-09 DIAGNOSIS — Z87.59 HISTORY OF PRETERM PREMATURE RUPTURE OF MEMBRANES (PPROM): ICD-10-CM

## 2025-07-09 DIAGNOSIS — O09.299 HISTORY OF MACROSOMIA IN INFANT IN PRIOR PREGNANCY, CURRENTLY PREGNANT: ICD-10-CM

## 2025-07-09 DIAGNOSIS — Z64.1 GRAND MULTIPARA: ICD-10-CM

## 2025-07-09 DIAGNOSIS — Z98.891 HISTORY OF CLASSICAL CESAREAN SECTION: ICD-10-CM

## 2025-07-09 DIAGNOSIS — O31.21X1 TWIN PREGNANCY WITH SINGLE INTRAUTERINE DEATH IN FIRST TRIMESTER, FETUS 1 OF MULTIPLE GESTATION: ICD-10-CM

## 2025-07-09 DIAGNOSIS — Z86.2 HISTORY OF ANEMIA: ICD-10-CM

## 2025-07-09 DIAGNOSIS — Z84.89 FAMILY HISTORY OF CYSTIC HYGROMA: Primary | ICD-10-CM

## 2025-07-09 PROCEDURE — 0502F SUBSEQUENT PRENATAL CARE: CPT | Performed by: OBSTETRICS & GYNECOLOGY

## 2025-07-09 NOTE — PROGRESS NOTES
Patient presents today for   Chief Complaint   Patient presents with    Routine Prenatal Visit     No VB or major cramping. +FM here and there.    No Known Allergies  Current Outpatient Medications   Medication Sig Dispense Refill    blood glucose monitor strips Test 4 times a day & as needed for symptoms of irregular blood glucose. Dispense sufficient amount for indicated testing frequency plus additional to accommodate PRN testing needs. Device: OneTouch Verio 200 strip 1    blood glucose monitor strips Test 4 times a day & as needed for symptoms of irregular blood glucose. Dispense sufficient amount for indicated testing frequency plus additional to accommodate PRN testing needs. Device: OneTouch Verio 200 strip 5    Lancets MISC 1 each by Does not apply route daily Use as directed to check sugars fasting and 1 hour after the first bite of each meal 4x daily (breakfast, lunch, dinner) 200 each 4    Pyridoxine HCl (VITAMIN B6) 50 MG TABS Take 1 tablet by mouth in the morning and at bedtime 180 tablet 1    ondansetron (ZOFRAN) 4 MG tablet Take 1 tablet by mouth daily as needed for Nausea or Vomiting 30 tablet 0    PRENATAL VIT-DOCUSATE-IRON-FA PO Take by mouth      blood glucose monitor strips Test 4 times a day & as needed for symptoms of irregular blood glucose. Dispense sufficient amount for indicated testing frequency plus additional to accommodate PRN testing needs. 100 strip 3     No current facility-administered medications for this visit.     Past Medical History:   Diagnosis Date    Advanced maternal age in multigravida     Anemia 2020    COVID-19 2022    Grand multipara     History of delivery of macrosomal infant 2017    born 9 lb 6.4 oz at 41w5d    History of  premature rupture of membranes (PPROM)     at 33w0d    Uterine fibroid      Past Surgical History:   Procedure Laterality Date     SECTION N/A 2023     [place] [facility] [place]

## 2025-07-21 PROBLEM — O09.522 HIGH RISK PREGNANCY, MULTIGRAVIDA OF ADVANCED MATERNAL AGE IN SECOND TRIMESTER: Status: ACTIVE | Noted: 2025-06-04

## 2025-07-21 PROBLEM — O99.212 OBESITY AFFECTING PREGNANCY IN SECOND TRIMESTER: Status: ACTIVE | Noted: 2025-06-04

## 2025-08-01 ENCOUNTER — ROUTINE PRENATAL (OUTPATIENT)
Dept: OBGYN CLINIC | Age: 40
End: 2025-08-01

## 2025-08-01 ENCOUNTER — PROCEDURE VISIT (OUTPATIENT)
Dept: OBGYN CLINIC | Age: 40
End: 2025-08-01

## 2025-08-01 VITALS — SYSTOLIC BLOOD PRESSURE: 100 MMHG | DIASTOLIC BLOOD PRESSURE: 62 MMHG | BODY MASS INDEX: 30.95 KG/M2 | WEIGHT: 186 LBS

## 2025-08-01 DIAGNOSIS — Z36.89 SCREENING, ANTENATAL, FOR FETAL ANATOMIC SURVEY: Primary | ICD-10-CM

## 2025-08-01 DIAGNOSIS — Z3A.20 20 WEEKS GESTATION OF PREGNANCY: ICD-10-CM

## 2025-08-01 DIAGNOSIS — Z64.1 GRAND MULTIPARA: Primary | ICD-10-CM

## 2025-08-01 DIAGNOSIS — O09.522 HIGH RISK PREGNANCY, MULTIGRAVIDA OF ADVANCED MATERNAL AGE IN SECOND TRIMESTER: ICD-10-CM

## 2025-08-01 DIAGNOSIS — O99.212 OBESITY AFFECTING PREGNANCY IN SECOND TRIMESTER, UNSPECIFIED OBESITY TYPE: ICD-10-CM

## 2025-08-01 DIAGNOSIS — Z36.89 ENCOUNTER FOR FETAL ANATOMIC SURVEY: ICD-10-CM

## 2025-08-01 DIAGNOSIS — O31.22X1 TWIN PREGNANCY WITH SINGLE INTRAUTERINE DEATH IN SECOND TRIMESTER, FETUS 1 OF MULTIPLE GESTATION: ICD-10-CM

## 2025-08-01 DIAGNOSIS — O09.522 MULTIGRAVIDA OF ADVANCED MATERNAL AGE IN SECOND TRIMESTER: ICD-10-CM

## 2025-08-01 PROBLEM — O24.410 DIET CONTROLLED GESTATIONAL DIABETES MELLITUS (GDM) IN SECOND TRIMESTER: Status: ACTIVE | Noted: 2025-08-01

## 2025-08-01 PROCEDURE — 0502F SUBSEQUENT PRENATAL CARE: CPT | Performed by: OBSTETRICS & GYNECOLOGY

## 2025-08-01 NOTE — PROGRESS NOTES
Kelsi  presents for STEFFANIE. . 20w4d.    PL and any MFM notes reviewed.. Med list reviewed.  Taking Prenatal Vitamins: Yes  She is noticing:  no unusual complaints    US shows:  Growth is NL, symmetrical  Anatomy NL but limited views of midline falx, profile  Breech  CL 4.50cm  Images reviewed.  Official report sent for scanning to chart      See OB VS for today's vitals, FHR, fundal height and presentation.    The following were addressed today:  Diagnoses and all orders for this visit:    Grand multipara    Twin pregnancy with single intrauterine death in second trimester, fetus 1 of multiple gestation    Obesity affecting pregnancy in second trimester, unspecified obesity type    High risk pregnancy, multigravida of advanced maternal age in second trimester    Encounter for fetal anatomic survey    20 weeks gestation of pregnancy

## 2025-09-04 ENCOUNTER — PROCEDURE VISIT (OUTPATIENT)
Dept: OBGYN CLINIC | Age: 40
End: 2025-09-04
Payer: COMMERCIAL

## 2025-09-04 DIAGNOSIS — Z36.89 ENCOUNTER FOR FETAL ANATOMIC SURVEY: Primary | ICD-10-CM

## 2025-09-04 DIAGNOSIS — Z3A.25 25 WEEKS GESTATION OF PREGNANCY: ICD-10-CM

## 2025-09-04 DIAGNOSIS — Z36.89 SCREENING, ANTENATAL, FOR FETAL ANATOMIC SURVEY: ICD-10-CM

## 2025-09-04 PROCEDURE — 76816 OB US FOLLOW-UP PER FETUS: CPT | Performed by: OBSTETRICS & GYNECOLOGY

## (undated) DEVICE — TUBING, SUCTION, 1/4" X 10', STRAIGHT: Brand: MEDLINE

## (undated) DEVICE — SURGICAL PROCEDURE PACK C SECT CDS

## (undated) DEVICE — APPLICATOR BNDG 1MM ADH PREMIERPRO EXOFIN

## (undated) DEVICE — KENDALL SCD EXPRESS SLEEVES, KNEE LENGTH, MEDIUM: Brand: KENDALL SCD

## (undated) DEVICE — APPLICATOR PREP 26ML 0.7% IOD POVACRYLEX 74% ISO ALC ST

## (undated) DEVICE — SOLUTION IV 1000ML 0.9% SOD CHL

## (undated) DEVICE — PENCIL ES L3M BTTN SWCH S STL HEX LOK BLDE ELECTRD HOLSTER

## (undated) DEVICE — SUTURE CHROMIC GUT SZ 1 L36IN ABSRB BRN L48MM CTX 1/2 CIR 905H

## (undated) DEVICE — SUTURE MCRYL SZ 4-0 L27IN ABSRB UD L19MM PS-2 1/2 CIR PRIM Y426H

## (undated) DEVICE — SUTURE CHROMIC GUT SZ 1 L27IN ABSRB BRN L36MM CT-1 1/2 CIR 813H

## (undated) DEVICE — KIT URIN CATH L16FR BLLN 5ML INDWL STR TIP INF CTRL URIN

## (undated) DEVICE — SUTURE PLN GUT SZ 2-0 L27IN ABSRB YELLOWISH TAN L40MM CT 853H

## (undated) DEVICE — ELECTRODE PT RET AD L9FT HI MOIST COND ADH HYDRGEL CORDED

## (undated) DEVICE — SOLUTION IRRIG 1000ML H2O STRL BLT

## (undated) DEVICE — SUTURE ABSORBABLE ANTIBACT 1-0 CT-1 24 IN STRATAFIX PDS + SXPP1A443

## (undated) DEVICE — STERILE POLYISOPRENE POWDER-FREE SURGICAL GLOVES: Brand: PROTEXIS